# Patient Record
Sex: MALE | Race: WHITE | NOT HISPANIC OR LATINO | Employment: UNEMPLOYED | URBAN - METROPOLITAN AREA
[De-identification: names, ages, dates, MRNs, and addresses within clinical notes are randomized per-mention and may not be internally consistent; named-entity substitution may affect disease eponyms.]

---

## 2018-07-19 ENCOUNTER — HOSPITAL ENCOUNTER (EMERGENCY)
Facility: HOSPITAL | Age: 53
Discharge: HOME | End: 2018-07-20
Attending: EMERGENCY MEDICINE | Admitting: EMERGENCY MEDICINE

## 2018-07-19 ENCOUNTER — APPOINTMENT (EMERGENCY)
Dept: RADIOLOGY | Facility: HOSPITAL | Age: 53
End: 2018-07-19

## 2018-07-19 DIAGNOSIS — F10.929 ALCOHOL INTOXICATION (HCC): Primary | ICD-10-CM

## 2018-07-19 LAB
AMPHETAMINES SERPL QL SCN: NEGATIVE
BARBITURATES UR QL: NEGATIVE
BENZODIAZ UR QL: NEGATIVE
COCAINE UR QL: NEGATIVE
ETHANOL SERPL-MCNC: 281 MG/DL (ref 0–3)
METHADONE UR QL: NEGATIVE
OPIATES UR QL SCN: NEGATIVE
PCP UR QL: NEGATIVE
THC UR QL: NEGATIVE

## 2018-07-19 PROCEDURE — 80320 DRUG SCREEN QUANTALCOHOLS: CPT | Performed by: EMERGENCY MEDICINE

## 2018-07-19 PROCEDURE — 70450 CT HEAD/BRAIN W/O DYE: CPT

## 2018-07-19 PROCEDURE — 80307 DRUG TEST PRSMV CHEM ANLYZR: CPT | Performed by: EMERGENCY MEDICINE

## 2018-07-19 PROCEDURE — 82075 ASSAY OF BREATH ETHANOL: CPT | Performed by: EMERGENCY MEDICINE

## 2018-07-19 PROCEDURE — 36415 COLL VENOUS BLD VENIPUNCTURE: CPT | Performed by: EMERGENCY MEDICINE

## 2018-07-19 PROCEDURE — 72125 CT NECK SPINE W/O DYE: CPT

## 2018-07-19 PROCEDURE — 96372 THER/PROPH/DIAG INJ SC/IM: CPT

## 2018-07-19 RX ORDER — MIDAZOLAM HYDROCHLORIDE 1 MG/ML
1 INJECTION INTRAMUSCULAR; INTRAVENOUS ONCE
Status: COMPLETED | OUTPATIENT
Start: 2018-07-19 | End: 2018-07-19

## 2018-07-19 RX ORDER — HALOPERIDOL 5 MG/ML
5 INJECTION INTRAMUSCULAR ONCE
Status: COMPLETED | OUTPATIENT
Start: 2018-07-19 | End: 2018-07-19

## 2018-07-19 RX ORDER — NICOTINE 21 MG/24HR
21 PATCH, TRANSDERMAL 24 HOURS TRANSDERMAL ONCE
Status: DISCONTINUED | OUTPATIENT
Start: 2018-07-19 | End: 2018-07-20 | Stop reason: HOSPADM

## 2018-07-19 RX ADMIN — HALOPERIDOL LACTATE 5 MG: 5 INJECTION, SOLUTION INTRAMUSCULAR at 16:09

## 2018-07-19 RX ADMIN — NICOTINE 21 MG: 21 PATCH, EXTENDED RELEASE TRANSDERMAL at 15:57

## 2018-07-19 RX ADMIN — MIDAZOLAM 1 MG: 1 INJECTION INTRAMUSCULAR; INTRAVENOUS at 17:07

## 2018-07-19 RX ADMIN — MIDAZOLAM 1 MG: 1 INJECTION INTRAMUSCULAR; INTRAVENOUS at 16:09

## 2018-07-19 NOTE — ED NOTES
Patient's sister Jeferson Lam and brother in law Tanner Angel [phone] on the phone talking to me about their concern with the patient's drinking/addiction problems  Tanner Angel states that they are working with an  to obtain guardianship for the patient  Tanner Angel asking if there is anything our doctor can do to determine that the patient is not safe to care for himself  I explained the evaluation process to him and informed him that we would need the patient's permission to inform them if he is discharged-which is their request  I also informed him about our process to determine what kind of treatment might be most appropriate for the patient         Anh Nova RN  07/19/18 [address], RN  07/19/18 9937

## 2018-07-19 NOTE — ED NOTES
Dumped two mini bottles of peach whiskey down the drain   Pt has cigarettes and lighter      Otilio Leyva, BRYAN  07/19/18 2333

## 2018-07-19 NOTE — ED NOTES
Pt becoming increasingly agitated, Dhruv and Yessi present while pt screaming and becoming belligerent  Pt attempting to break out of restraints, or stated he will break his own arms or legs attempting  Brake released on bed  Pt given meds to help him relax and calm down        Luciana Salter  07/19/18 8329

## 2018-07-19 NOTE — ED NOTES
Pt awoke and was extremely agitated screaming and again stating that he will break his arms and legs trying to get out of restraints  Pt was about to start spitting when [address] MD Resident approached pt and redirected him        Joanne Salter  07/19/18 3796
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
## 2018-07-19 NOTE — ED NOTES
While RN Yessi applying Nicotine patch, pt stated "Even though I love you ladies, I will run you over if you get in my way"  Pt repeated this multiple times while RN  present       [facility]  07/19/18 7048
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
## 2018-07-20 ENCOUNTER — DOCUMENTATION (OUTPATIENT)
Dept: EMERGENCY DEPT | Facility: HOSPITAL | Age: 53
End: 2018-07-20

## 2018-07-20 VITALS
WEIGHT: 150 LBS | SYSTOLIC BLOOD PRESSURE: 121 MMHG | OXYGEN SATURATION: 96 % | RESPIRATION RATE: 18 BRPM | TEMPERATURE: 96.4 F | DIASTOLIC BLOOD PRESSURE: 68 MMHG | HEART RATE: 82 BPM

## 2018-07-20 LAB
ETHANOL EXG-MCNC: 0.1 MG/DL
ETHANOL EXG-MCNC: 0.13 MG/DL

## 2018-07-20 PROCEDURE — 82075 ASSAY OF BREATH ETHANOL: CPT | Performed by: EMERGENCY MEDICINE

## 2018-07-20 PROCEDURE — 99285 EMERGENCY DEPT VISIT HI MDM: CPT

## 2018-07-20 NOTE — ED NOTES
Patient's two lighters and cigarettes are on shelf C     Evon Aschoff, [address] [facility]  07/19/18 2110

## 2018-07-20 NOTE — DISCHARGE INSTRUCTIONS
Alcohol Intoxication   WHAT YOU NEED TO KNOW:   Alcohol intoxication is a harmful physical condition caused when you drink more alcohol than your body can handle  It is also called ethanol poisoning, or being drunk  DISCHARGE INSTRUCTIONS:   Medicine: You may be given medicine to manage the signs and symptoms of alcohol intoxication  Take your medicine as directed  Contact your healthcare provider if you think your medicine is not helping or if you have side effects  Tell him if you are allergic to any medicine  Keep a list of the medicines, vitamins, and herbs you take  Include the amounts, and when and why you take them  Bring the list or the pill bottles to follow-up visits  Keep the list with you in case of emergency  Follow up with your healthcare provider as directed:  Write down your questions so you remember to ask them during your visits  Limit or avoid alcohol:  Men should not have more than 2 drinks per day  Women should not have more than 1 drink per day  A drink is 12 ounces of beer, 5 ounces of wine, or 1½ ounces of liquor  Do not drive or operate machines when you drink alcohol:  Make sure you always have someone to drive you when you drink alcohol  For more information:   · Alcoholics Anonymous  Web Address: http://www horn [employer]/  Contact your healthcare provider if:   · You need help to stop drinking alcohol  · You have trouble with work or school because you drink too much alcohol  · You have physical or verbal fights because of alcohol  · You have questions or concerns about your condition or care  Return to the emergency department if:   · You have sudden trouble breathing or chest pain  · You have a seizure  · You feel sad enough to harm yourself or others  · You have hallucinations (you see or hear things that are not real)  · You cannot stop vomiting  · You were in an accident because of alcohol    © 2017 [address] [employer] Information is for End User's use only and may not be sold, redistributed or otherwise used for commercial purposes  All illustrations and images included in CareNotes® are the copyrighted property of A D A M , Inc  or [employer] Hui  The above information is an  only  It is not intended as medical advice for individual conditions or treatments  Talk to your doctor, nurse or pharmacist before following any medical regimen to see if it is safe and effective for you

## 2018-07-20 NOTE — ED CARE HANDOFF
The patient was re-evaluated at 1:30 a m  he has a breath alcohol level of 100   he is not clinically intoxicated   he denies wanting to hurt himself he denies wanting to hurt others   he is awake alert orient x3 he is requesting to be discharged  He is not hallucinating  He has no access to firearms  He is also requesting a cigarette  Patient was offered help with his alcohol problem   he declined         Mayda Plasencia MD  07/20/18 4750

## 2018-07-20 NOTE — ED NOTES
Restraints (right ankle, left wrist) d/c'd  Order for restraints placed for restraints that were in place at 2300 yesterday        Shauna Snow RN  07/20/18 7366
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
## 2018-12-17 ENCOUNTER — HOSPITAL ENCOUNTER (EMERGENCY)
Facility: HOSPITAL | Age: 53
Discharge: HOME | End: 2018-12-17
Attending: EMERGENCY MEDICINE
Payer: COMMERCIAL

## 2018-12-17 VITALS
OXYGEN SATURATION: 97 % | SYSTOLIC BLOOD PRESSURE: 147 MMHG | RESPIRATION RATE: 18 BRPM | WEIGHT: 180 LBS | HEART RATE: 78 BPM | DIASTOLIC BLOOD PRESSURE: 81 MMHG | TEMPERATURE: 97.9 F

## 2018-12-17 DIAGNOSIS — S61.412A LACERATION OF LEFT HAND WITHOUT FOREIGN BODY, INITIAL ENCOUNTER: Primary | ICD-10-CM

## 2018-12-17 PROCEDURE — 99282 EMERGENCY DEPT VISIT SF MDM: CPT

## 2018-12-17 RX ORDER — BACITRACIN, NEOMYCIN, POLYMYXIN B 400; 3.5; 5 [USP'U]/G; MG/G; [USP'U]/G
1 OINTMENT TOPICAL ONCE
Status: COMPLETED | OUTPATIENT
Start: 2018-12-17 | End: 2018-12-17

## 2018-12-17 RX ORDER — LIDOCAINE HYDROCHLORIDE 10 MG/ML
5 INJECTION, SOLUTION EPIDURAL; INFILTRATION; INTRACAUDAL; PERINEURAL ONCE
Status: COMPLETED | OUTPATIENT
Start: 2018-12-17 | End: 2018-12-17

## 2018-12-17 RX ADMIN — LIDOCAINE HYDROCHLORIDE 5 ML: 10 INJECTION, SOLUTION EPIDURAL; INFILTRATION; INTRACAUDAL; PERINEURAL at 11:58

## 2018-12-17 RX ADMIN — BACITRACIN, NEOMYCIN, POLYMYXIN B 1 SMALL APPLICATION: 400; 3.5; 5 OINTMENT TOPICAL at 11:58

## 2018-12-17 NOTE — DISCHARGE INSTRUCTIONS
Laceration   WHAT YOU NEED TO KNOW:   A laceration is an injury to the skin and the soft tissue underneath it  Lacerations happen when you are cut or hit by something  They can happen anywhere on the body  DISCHARGE INSTRUCTIONS:   Return to the emergency department if:   · You have heavy bleeding or bleeding that does not stop after 10 minutes of holding firm, direct pressure over the wound  · Your wound opens up  Contact your healthcare provider if:   · You have a fever or chills  · Your laceration is red, warm, or swollen  · You have red streaks on your skin coming from your wound  · You have white or yellow drainage from the wound that smells bad  · You have pain that gets worse, even after treatment  · You have questions or concerns about your condition or care  Medicines:   · Prescription pain medicine  may be given  Ask how to take this medicine safely  · Antibiotics  help treat or prevent a bacterial infection  · Take your medicine as directed  Contact your healthcare provider if you think your medicine is not helping or if you have side effects  Tell him or her if you are allergic to any medicine  Keep a list of the medicines, vitamins, and herbs you take  Include the amounts, and when and why you take them  Bring the list or the pill bottles to follow-up visits  Carry your medicine list with you in case of an emergency  Care for your wound as directed:   · Do not get your wound wet  until your healthcare provider says it is okay  Do not soak your wound in water  Do not go swimming until your healthcare provider says it is okay  Carefully wash the wound with soap and water  Gently pat the area dry or allow it to air dry  · Change your bandages  when they get wet, dirty, or after washing  Apply new, clean bandages as directed  Do not apply elastic bandages or tape too tight  Do not put powders or lotions over your incision       · Apply antibiotic ointment as directed  Your healthcare provider may give you antibiotic ointment to put over your wound if you have stitches  If you have strips of tape over your incision, let them dry up and fall off on their own  If they do not fall off within 14 days, gently remove them  If you have glue over your wound, do not remove or pick at it  If your glue comes off, do not replace it with glue that you have at home  · Check your wound every day for signs of infection such as swelling, redness, or pus  Self-care:   · Apply ice  on your wound for 15 to 20 minutes every hour or as directed  Use an ice pack, or put crushed ice in a plastic bag  Cover it with a towel  Ice helps prevent tissue damage and decreases swelling and pain  · Use a splint as directed  A splint will decrease movement and stress on your wound  It may help it heal faster  A splint may be used for lacerations over joints or areas of your body that bend  Ask your healthcare provider how to apply and remove a splint  · Decrease scarring of your wound  by applying ointments as directed  Do not apply ointments until your healthcare provider says it is okay  You may need to wait until your wound is healed  Ask which ointment to buy and how often to use it  After your wound is healed, use sunscreen over the area when you are out in the sun  You should do this for at least 6 months to 1 year after your injury  Follow up with your healthcare provider as directed: You may need to follow up in 24 to 48 hours to have your wound checked for infection  You will need to return in 3 to 14 days if you have stitches or staples so they can be removed  Care for your wound as directed to prevent infection and help it heal  Write down your questions so you remember to ask them during your visits  © 2017 [employer] Information is for End User's use only and may not be sold, redistributed or otherwise used for commercial purposes   All illustrations and images included in CareNotes® are the copyrighted property of A [employer]  or [employer]  The above information is an  only  It is not intended as medical advice for individual conditions or treatments  Talk to your doctor, nurse or pharmacist before following any medical regimen to see if it is safe and effective for you  Care For Your Stitches   WHAT YOU NEED TO KNOW:   Stitches, or sutures, are used to close cuts and wounds on the skin  Stitches need to be removed after your wound has healed  DISCHARGE INSTRUCTIONS:   Return to the emergency department if:   · Your stitches come apart  · Blood soaks through your bandages  · You suddenly cannot move your injured joint  · You have sudden numbness around your wound  · You see red streaks coming from your wound  Contact your healthcare provider if:   · You have a fever and chills  · Your wound is red, warm, swollen, or leaking pus  · There is a bad smell coming from your wound  · You have increased pain in the wound area  · You have questions or concerns about your condition or care  Care for your stitches:  Keep your stitches clean and dry  You may need to cover your stitches with a bandage for 24 to 48 hours, or as directed  Do not bump or hit the suture area, as this could open the wound  Do not trim or shorten the ends of your stitches  If they rub on your clothing, put a gauze bandage between the stitches and your clothes  Clean your wound:  Carefully wash your wound with soap and water  For mouth and lip wounds, rinse your mouth after meals and at bedtime  Ask your healthcare provider what to use to rinse your mouth  If you have a scalp wound, you may gently wash your hair every 2 days with mild shampoo  Do not use hair products, such as hair spray  Help your wound heal:   · Elevate  your wound above the level of your heart as often as you can  This will help decrease swelling and pain   Prop your wound on pillows or blankets to keep it elevated comfortably  · Limit activity  Do not stretch the skin around your wound  This will help prevent bleeding and swelling of the wound area  Follow up with your healthcare provider as directed: You may need to return to have your stitches removed  Write down your questions so you remember to ask them during your visits  © 2017 [employer]  Information is for End User's use only and may not be sold, redistributed or otherwise used for commercial purposes  All illustrations and images included in CareNotes® are the copyrighted property of [employer]  or [employer]  The above information is an  only  It is not intended as medical advice for individual conditions or treatments  Talk to your doctor, nurse or pharmacist before following any medical regimen to see if it is safe and effective for you

## 2018-12-17 NOTE — ED PROVIDER NOTES
History  Chief Complaint   Patient presents with    Hand Laceration     pt reports he cut his L hand on a razor at work  pt believes he is UTD on tetanus  pt has his hand dressed with ducktape        History provided by:  Patient  Laceration   Location:  Hand  Hand laceration location:  L hand  Length:  3  Depth:  Cutaneous  Quality: straight    Bleeding: controlled    Time since incident:  2 hours  Laceration mechanism:  Metal edge  Pain details:     Severity:  No pain  Relieved by:  Pressure  Worsened by:  Nothing  Ineffective treatments:  None tried  Tetanus status:  Up to date  Associated symptoms: no fever, no focal weakness, no numbness, no rash, no redness, no swelling and no streaking        None       History reviewed  No pertinent past medical history  History reviewed  No pertinent surgical history  History reviewed  No pertinent family history  I have reviewed and agree with the history as documented  Social History   Substance Use Topics    Smoking status: Current Every Day Smoker    Smokeless tobacco: Never Used    Alcohol use Yes      Comment: "recovery for 5 months"        Review of Systems   Constitutional: Negative for fever  Skin: Negative for rash  Neurological: Negative for focal weakness  Physical Exam  Physical Exam   Constitutional: He appears well-developed and well-nourished  No distress  HENT:   Head: Normocephalic and atraumatic  Eyes: Pupils are equal, round, and reactive to light  Conjunctivae and EOM are normal    Neck: Normal range of motion  Neck supple  Cardiovascular: Normal rate, regular rhythm and intact distal pulses  Musculoskeletal: Normal range of motion  Neurological: He is alert  Skin: Skin is warm  He is not diaphoretic  No erythema  No pallor  3cm straight laceration on dorsum of left hand, over dorsal aspect of 1st metacarpal  Bleeding controlled  Nursing note and vitals reviewed        Vital Signs  ED Triage Vitals [12/17/18 1100] Temperature Pulse Respirations Blood Pressure SpO2   97 9 °F (36 6 °C) 78 18 147/81 97 %      Temp Source Heart Rate Source Patient Position - Orthostatic VS BP Location FiO2 (%)   Oral Monitor -- -- --      Pain Score       --           Vitals:    12/17/18 1100   BP: 147/81   Pulse: 78       Visual Acuity      ED Medications  Medications   lidocaine (PF) (XYLOCAINE-MPF) 1 % injection 5 mL (5 mL Infiltration Given 12/17/18 1158)   neomycin-bacitracin-polymyxin b (NEOSPORIN) ointment 1 small application (1 small application Topical Given 12/17/18 1158)       Diagnostic Studies  Results Reviewed     None                 No orders to display              Procedures  Lac Repair  Date/Time: 12/17/2018 12:27 PM  Performed by: Shelby Martins by: Jose Montalvo   Consent: Verbal consent obtained  Risks and benefits: risks, benefits and alternatives were discussed  Consent given by: patient  Required items: required blood products, implants, devices, and special equipment available  Patient identity confirmed: provided demographic data  Time out: Immediately prior to procedure a "time out" was called to verify the correct patient, procedure, equipment, support staff and site/side marked as required  Body area: upper extremity  Location details: left hand  Laceration length: 3 cm  Tendon involvement: none  Nerve involvement: none  Vascular damage: no    Anesthesia:  Local Anesthetic: lidocaine 1% without epinephrine  Anesthetic total: 3 mL    Sedation:  Patient sedated: no    Wound Dehiscence:  Superficial Wound Dehiscence: simple closure      Procedure Details:  Preparation: Patient was prepped and draped in the usual sterile fashion    Irrigation solution: saline  Irrigation method: syringe  Debridement: none  Degree of undermining: none  Skin closure: 5-0 nylon  Number of sutures: 4  Technique: simple  Approximation: loose  Approximation difficulty: simple  Dressing: antibiotic ointment and 4x4 sterile gauze  Patient tolerance: Patient tolerated the procedure well with no immediate complications             Phone Contacts  ED Phone Contact    ED Course                               MDM  Number of Diagnoses or Management Options  Laceration of left hand without foreign body, initial encounter: new and requires workup  Diagnosis management comments: SUBJECTIVE:   46 y o  male sustained laceration of hand 2 hours ago  Nature of injury: razor wire fell against dorsal aspect of hand  Tetanus vaccination status reviewed: last tetanus booster within 10 years, tetanus re-vaccination not indicated  OBJECTIVE:   Patient appears well, vitals are normal  Laceration 3 cm noted  Description: clean wound edges, no foreign bodies  Neurovascular and tendon structures are intact  ASSESSMENT:   Laceration as described  PLAN:   Anesthesia with 1% Lidocaine without Epinephrine  Wound cleansed, debrided of visible foreign material and necrotic tissue, and sutured  Antibiotic ointment and dressing applied  Wound care instructions provided  Observe for any signs of infection or other problems  Return for suture removal in 10 days  Risk of Complications, Morbidity, and/or Mortality  Presenting problems: low  Diagnostic procedures: low  Management options: low    Patient Progress  Patient progress: improved    CritCare Time    Disposition  Final diagnoses:   Laceration of left hand without foreign body, initial encounter     Time reflects when diagnosis was documented in both MDM as applicable and the Disposition within this note     Time User Action Codes Description Comment    12/17/2018 12:28 PM Walter Wylie Add [[id number] [id number]] Laceration of left hand without foreign body, initial encounter       ED Disposition     ED Disposition Condition Comment    Discharge  Katty Winter discharge to home/self care      Condition at discharge: Stable        Follow-up Information     Follow up With Specialties Details Why Contact Info Additional [address] Emergency Department Emergency Medicine  For suture removal, If symptoms worsen [address]  [phone]  ED, [address] [place] [address], [facility]          There are no discharge medications for this patient  No discharge procedures on file      ED Provider  Electronically Signed by           Edvin Macdonald PA-C  12/17/18 2651

## 2019-01-24 ENCOUNTER — HOSPITAL ENCOUNTER (EMERGENCY)
Facility: HOSPITAL | Age: 54
Discharge: LEFT BEFORE DISCHARGE | End: 2019-01-25
Attending: EMERGENCY MEDICINE | Admitting: INTERNAL MEDICINE
Payer: COMMERCIAL

## 2019-01-24 ENCOUNTER — APPOINTMENT (EMERGENCY)
Dept: RADIOLOGY | Facility: HOSPITAL | Age: 54
End: 2019-01-24
Payer: COMMERCIAL

## 2019-01-24 VITALS
HEART RATE: 110 BPM | RESPIRATION RATE: 22 BRPM | WEIGHT: 175 LBS | OXYGEN SATURATION: 94 % | DIASTOLIC BLOOD PRESSURE: 75 MMHG | SYSTOLIC BLOOD PRESSURE: 134 MMHG | TEMPERATURE: 98.8 F

## 2019-01-24 DIAGNOSIS — J40 BRONCHITIS: Primary | ICD-10-CM

## 2019-01-24 DIAGNOSIS — J44.9 COPD (CHRONIC OBSTRUCTIVE PULMONARY DISEASE) (HCC): ICD-10-CM

## 2019-01-24 PROBLEM — J96.00 ACUTE RESPIRATORY FAILURE (HCC): Status: ACTIVE | Noted: 2019-01-24

## 2019-01-24 PROBLEM — J96.01 ACUTE RESPIRATORY FAILURE WITH HYPOXIA (HCC): Status: ACTIVE | Noted: 2019-01-24

## 2019-01-24 PROBLEM — F10.21 ALCOHOL DEPENDENCE IN REMISSION (HCC): Chronic | Status: ACTIVE | Noted: 2019-01-24

## 2019-01-24 PROBLEM — J44.1 CHRONIC OBSTRUCTIVE PULMONARY DISEASE WITH ACUTE EXACERBATION (HCC): Status: ACTIVE | Noted: 2019-01-24

## 2019-01-24 PROBLEM — F17.200 NICOTINE DEPENDENCE: Chronic | Status: ACTIVE | Noted: 2019-01-24

## 2019-01-24 LAB
ALBUMIN SERPL BCP-MCNC: 4 G/DL (ref 3.5–5)
ALP SERPL-CCNC: 83 U/L (ref 46–116)
ALT SERPL W P-5'-P-CCNC: 23 U/L (ref 12–78)
ANION GAP SERPL CALCULATED.3IONS-SCNC: 5 MMOL/L (ref 4–13)
AST SERPL W P-5'-P-CCNC: 36 U/L (ref 5–45)
BASOPHILS # BLD AUTO: 0.03 THOUSANDS/ΜL (ref 0–0.1)
BASOPHILS NFR BLD AUTO: 1 % (ref 0–1)
BILIRUB SERPL-MCNC: 0.38 MG/DL (ref 0.2–1)
BUN SERPL-MCNC: 12 MG/DL (ref 5–25)
CALCIUM SERPL-MCNC: 8.7 MG/DL (ref 8.3–10.1)
CHLORIDE SERPL-SCNC: 107 MMOL/L (ref 100–108)
CO2 SERPL-SCNC: 26 MMOL/L (ref 21–32)
CREAT SERPL-MCNC: 0.96 MG/DL (ref 0.6–1.3)
EOSINOPHIL # BLD AUTO: 0.01 THOUSAND/ΜL (ref 0–0.61)
EOSINOPHIL NFR BLD AUTO: 0 % (ref 0–6)
ERYTHROCYTE [DISTWIDTH] IN BLOOD BY AUTOMATED COUNT: 12.4 % (ref 11.6–15.1)
GFR SERPL CREATININE-BSD FRML MDRD: 89 ML/MIN/1.73SQ M
GLUCOSE SERPL-MCNC: 101 MG/DL (ref 65–140)
HCT VFR BLD AUTO: 43.3 % (ref 36.5–49.3)
HGB BLD-MCNC: 14.5 G/DL (ref 12–17)
IMM GRANULOCYTES # BLD AUTO: 0.03 THOUSAND/UL (ref 0–0.2)
IMM GRANULOCYTES NFR BLD AUTO: 1 % (ref 0–2)
LYMPHOCYTES # BLD AUTO: 0.42 THOUSANDS/ΜL (ref 0.6–4.47)
LYMPHOCYTES NFR BLD AUTO: 6 % (ref 14–44)
MCH RBC QN AUTO: 32.2 PG (ref 26.8–34.3)
MCHC RBC AUTO-ENTMCNC: 33.5 G/DL (ref 31.4–37.4)
MCV RBC AUTO: 96 FL (ref 82–98)
MONOCYTES # BLD AUTO: 0.56 THOUSAND/ΜL (ref 0.17–1.22)
MONOCYTES NFR BLD AUTO: 9 % (ref 4–12)
NEUTROPHILS # BLD AUTO: 5.52 THOUSANDS/ΜL (ref 1.85–7.62)
NEUTS SEG NFR BLD AUTO: 83 % (ref 43–75)
NRBC BLD AUTO-RTO: 0 /100 WBCS
PLATELET # BLD AUTO: 244 THOUSANDS/UL (ref 149–390)
PMV BLD AUTO: 10 FL (ref 8.9–12.7)
POTASSIUM SERPL-SCNC: 3.8 MMOL/L (ref 3.5–5.3)
PROT SERPL-MCNC: 7.5 G/DL (ref 6.4–8.2)
RBC # BLD AUTO: 4.51 MILLION/UL (ref 3.88–5.62)
SODIUM SERPL-SCNC: 138 MMOL/L (ref 136–145)
TROPONIN I SERPL-MCNC: <0.02 NG/ML
WBC # BLD AUTO: 6.57 THOUSAND/UL (ref 4.31–10.16)

## 2019-01-24 PROCEDURE — 93005 ELECTROCARDIOGRAM TRACING: CPT

## 2019-01-24 PROCEDURE — 94644 CONT INHLJ TX 1ST HOUR: CPT

## 2019-01-24 PROCEDURE — 94760 N-INVAS EAR/PLS OXIMETRY 1: CPT

## 2019-01-24 PROCEDURE — 94640 AIRWAY INHALATION TREATMENT: CPT

## 2019-01-24 PROCEDURE — 80053 COMPREHEN METABOLIC PANEL: CPT | Performed by: EMERGENCY MEDICINE

## 2019-01-24 PROCEDURE — 96368 THER/DIAG CONCURRENT INF: CPT

## 2019-01-24 PROCEDURE — 36415 COLL VENOUS BLD VENIPUNCTURE: CPT | Performed by: EMERGENCY MEDICINE

## 2019-01-24 PROCEDURE — 96365 THER/PROPH/DIAG IV INF INIT: CPT

## 2019-01-24 PROCEDURE — 96375 TX/PRO/DX INJ NEW DRUG ADDON: CPT

## 2019-01-24 PROCEDURE — 99284 EMERGENCY DEPT VISIT MOD MDM: CPT

## 2019-01-24 PROCEDURE — 85025 COMPLETE CBC W/AUTO DIFF WBC: CPT | Performed by: EMERGENCY MEDICINE

## 2019-01-24 PROCEDURE — 84484 ASSAY OF TROPONIN QUANT: CPT | Performed by: EMERGENCY MEDICINE

## 2019-01-24 PROCEDURE — 71046 X-RAY EXAM CHEST 2 VIEWS: CPT

## 2019-01-24 RX ORDER — SODIUM CHLORIDE FOR INHALATION 0.9 %
3 VIAL, NEBULIZER (ML) INHALATION ONCE
Status: COMPLETED | OUTPATIENT
Start: 2019-01-24 | End: 2019-01-24

## 2019-01-24 RX ORDER — METHYLPREDNISOLONE SODIUM SUCCINATE 125 MG/2ML
125 INJECTION, POWDER, LYOPHILIZED, FOR SOLUTION INTRAMUSCULAR; INTRAVENOUS ONCE
Status: COMPLETED | OUTPATIENT
Start: 2019-01-24 | End: 2019-01-24

## 2019-01-24 RX ORDER — ALBUTEROL SULFATE 2.5 MG/3ML
5 SOLUTION RESPIRATORY (INHALATION) ONCE
Status: COMPLETED | OUTPATIENT
Start: 2019-01-24 | End: 2019-01-24

## 2019-01-24 RX ORDER — 0.9 % SODIUM CHLORIDE 0.9 %
3 VIAL (ML) INJECTION AS NEEDED
Status: DISCONTINUED | OUTPATIENT
Start: 2019-01-24 | End: 2019-01-25 | Stop reason: HOSPADM

## 2019-01-24 RX ADMIN — ISODIUM CHLORIDE 3 ML: 0.03 SOLUTION RESPIRATORY (INHALATION) at 21:57

## 2019-01-24 RX ADMIN — AZITHROMYCIN MONOHYDRATE 500 MG: 500 INJECTION, POWDER, LYOPHILIZED, FOR SOLUTION INTRAVENOUS at 22:45

## 2019-01-24 RX ADMIN — METHYLPREDNISOLONE SODIUM SUCCINATE 125 MG: 125 INJECTION, POWDER, FOR SOLUTION INTRAMUSCULAR; INTRAVENOUS at 22:15

## 2019-01-24 RX ADMIN — IPRATROPIUM BROMIDE 1 MG: 0.5 SOLUTION RESPIRATORY (INHALATION) at 21:57

## 2019-01-24 RX ADMIN — ALBUTEROL SULFATE 5 MG: 2.5 SOLUTION RESPIRATORY (INHALATION) at 19:32

## 2019-01-24 RX ADMIN — CEFTRIAXONE SODIUM 1000 MG: 10 INJECTION, POWDER, FOR SOLUTION INTRAVENOUS at 22:20

## 2019-01-24 RX ADMIN — IPRATROPIUM BROMIDE 0.5 MG: 0.5 SOLUTION RESPIRATORY (INHALATION) at 19:32

## 2019-01-24 RX ADMIN — ALBUTEROL SULFATE 10 MG: 2.5 SOLUTION RESPIRATORY (INHALATION) at 21:57

## 2019-01-24 NOTE — Clinical Note
Case was discussed with SOD and the patient's admission status was agreed to be Admission Status: inpatient status to the service of Dr Joan Osei

## 2019-01-25 LAB
ATRIAL RATE: 104 BPM
P AXIS: 72 DEGREES
PR INTERVAL: 148 MS
QRS AXIS: 82 DEGREES
QRSD INTERVAL: 100 MS
QT INTERVAL: 320 MS
QTC INTERVAL: 420 MS
T WAVE AXIS: 74 DEGREES
VENTRICULAR RATE: 104 BPM

## 2019-01-25 PROCEDURE — 93010 ELECTROCARDIOGRAM REPORT: CPT | Performed by: INTERNAL MEDICINE

## 2019-01-25 NOTE — ED ATTENDING ATTESTATION
Feliberto Wilson MD, saw and evaluated the patient  All available labs and X-rays were ordered by me or the resident and have been reviewed by myself  I discussed the patient with the resident / non-physician and agree with the resident's / non-physician practitioner's findings and plan as documented in the resident's / non-physician practicitioner's note, except where noted  At this point, I agree with the current assessment done in the ED  Chief Complaint   Patient presents with    Cough     "i've been sick for 2 days, my lungs are so congested i can hardly breath", unknown fever, nasal congestion       This is a 48-year-old male presenting for URI x2 days  The patient states that multiple people at home are sick with URI symptoms  For 2 days though he has been having symptoms feeling he can't get enough air, feeling very congested and hard to breathe  Denies any fevers night sweats chills  Denies chest pain  Denies arm pain jaw pain back pain diaphoresis  His cough is productive greenish mucus at times  His main concern is also nasal congestion that is there, denies sore throat  No history of similar  Does not use any breathing treatments  He is currently in rehab for alcohol abuse, clean for 6 months  PMH:  - None  PSH:  - None  1ppd x40 years  +alcohol in past, no alcohol x6 months  No drug use ever  PE:  Vitals:    01/24/19 1927 01/24/19 2157 01/24/19 2220   BP: 142/81  134/75   BP Location: Left arm  Right arm   Pulse: (!) 106  (!) 110   Resp: (!) 24  22   Temp: 98 8 °F (37 1 °C)     TempSrc: Oral     SpO2: 96% 94% 94%   Weight: 79 4 kg (175 lb)     General: VSS, NAD, awake, alert  Well-nourished, well-developed  Appears stated age  Speaking normally in full sentences  Head: Normocephalic, atraumatic, nontender  Eyes: PERRL, EOM-I  No diplopia  No hyphema  No subconjunctival hemorrhages  Symmetrical lids  ENT: Atraumatic external nose and ears  MMM  No malocclusion   No stridor  Normal phonation  No drooling  Normal swallowing  Neck: Symmetric, trachea midline  No JVD  CV: tchycardia ()   +S1/S2  No murmurs or gallops  Peripheral pulses +2 throughout  No chest wall tenderness  Lungs:    mildly labored, wheezing present, barrel chest, no cyanosis, positive hypoxia with an O2 value of 88% on room air, not normally on oxygen supplementation  Abd: +BS, soft, NT/ND    MSK:   FROM   Back:   No rashes  Skin: Dry, intact  Neuro: AAOx3, GCS 15, CN II-XII grossly intact  Motor grossly intact  Psychiatric/Behavioral: Appropriate mood and affect   Exam: deferred  A:  - URI  P:  - likely URI but given severity of work of breathing, +hypoxia, will do aggressive treatment  - We will do IVF for insensible losses  - Will do IV steroids if EMS has not provided it; solumedrol 125mg  - We will do aggressive beta-agonist therapy, specifically 1 hour LUNA neb   - CXR for PNA  - Will continue to monitor patient  - Re-assess --> disposition based on improvement  If still wheezing, tachypneic, or tachycardic, or appears ill and unable to do baseline activities, will admit  - 13 point ROS was performed and all are normal unless stated in the history above  - Nursing note reviewed  Vitals reviewed  - Orders placed by myself and/or advanced practitioner / resident     - Previous chart was reviewed  - No language barrier    - History obtained from patient  - There are no limitations to the history obtained  - Critical care time: 33 minutes  - Critical care time was exclusive of seperately bilable procedures and treating other patients as well as teaching time     - Critical care was necessary to treat or prevent imminent or life-threatening deterioration of the following condition: Dyspnea, COPD exacerbation necessitating 1 hour neb treatment  - Critical care time was spent personally by me on the following activities as well as the above as per the ED course and rest of chart: blood draw for specimens, obtaining history from patient / surrogate, developement of a treatment plan, discussions with consultants, evaluation of patient's response to the treatment, examination of the patient, ordering/performing treatements and interventions, re-evaluation of the patient's condition, review of old charts, ordering/reviewing laboratory studies, ordering/reviewing of radiographic studies    Final Diagnosis:  1  Bronchitis    2  COPD (chronic obstructive pulmonary disease) ([facility])         Medications   albuterol inhalation solution 5 mg (5 mg Nebulization Given 1/24/19 1932)   ipratropium (ATROVENT) 0 02 % inhalation solution 0 5 mg (0 5 mg Nebulization Given 1/24/19 1932)   methylPREDNISolone sodium succinate (Solu-MEDROL) injection 125 mg (125 mg Intravenous Given 1/24/19 2215)   albuterol inhalation solution 10 mg (10 mg Nebulization Given 1/24/19 2157)     And   ipratropium (ATROVENT) 0 02 % inhalation solution 1 mg (1 mg Nebulization Given 1/24/19 2157)     And   sodium chloride 0 9 % inhalation solution 3 mL (3 mL Nebulization Given 1/24/19 2157)   ceftriaxone (ROCEPHIN) 1 g/50 mL in dextrose IVPB (0 mg Intravenous Stopped 1/24/19 2352)   azithromycin (ZITHROMAX) 500 mg in sodium chloride 0 9% 250mL IVPB 500 mg (0 mg Intravenous Stopped 1/24/19 2352)     X-ray chest 2 views   Final Result      No acute cardiopulmonary disease              Workstation performed: [id number]           Orders Placed This Encounter   Procedures    X-ray chest 2 views    CBC and differential    Troponin I    Comprehensive metabolic panel    Continuous cardiac monitoring    Continuous pulse oximetry    EKG RESULTS    ECG 12 lead    ECG 12 lead    Inpatient Admission    Update to Emergency Patient Class     Labs Reviewed   CBC AND DIFFERENTIAL - Abnormal        Result Value Ref Range Status    WBC 6 57  4 31 - 10 16 Thousand/uL Final    RBC 4 51  3 88 - 5 62 Million/uL Final    Hemoglobin 14 5  12 0 - 17 0 g/dL Final    Hematocrit 43 3  36 5 - 49 3 % Final    MCV 96  82 - 98 fL Final    MCH 32 2  26 8 - 34 3 pg Final    MCHC 33 5  31 4 - 37 4 g/dL Final    RDW 12 4  11 6 - 15 1 % Final    MPV 10 0  8 9 - 12 7 fL Final    Platelets 917  633 - 390 Thousands/uL Final    nRBC 0  /100 WBCs Final    Neutrophils Relative 83 (*) 43 - 75 % Final    Immat GRANS % 1  0 - 2 % Final    Lymphocytes Relative 6 (*) 14 - 44 % Final    Monocytes Relative 9  4 - 12 % Final    Eosinophils Relative 0  0 - 6 % Final    Basophils Relative 1  0 - 1 % Final    Neutrophils Absolute 5 52  1 85 - 7 62 Thousands/µL Final    Immature Grans Absolute 0 03  0 00 - 0 20 Thousand/uL Final    Lymphocytes Absolute 0 42 (*) 0 60 - 4 47 Thousands/µL Final    Monocytes Absolute 0 56  0 17 - 1 22 Thousand/µL Final    Eosinophils Absolute 0 01  0 00 - 0 61 Thousand/µL Final    Basophils Absolute 0 03  0 00 - 0 10 Thousands/µL Final   TROPONIN I - Normal    Troponin I <0 02  <=0 04 ng/mL Final    Comment:   [employer] Chemistry analyzer 99% cutoff is > 0 04 ng/mL in network labs     o cTnI 99% cutoff is useful only when applied to patients in the clinical setting of myocardial ischemia   o cTnI 99% cutoff should be interpreted in the context of clinical history, ECG findings and possibly cardiac imaging to establish correct diagnosis  o cTnI 99% cutoff may be suggestive but clearly not indicative of a coronary event without the clinical setting of myocardial ischemia  COMPREHENSIVE METABOLIC PANEL    Sodium 685  136 - 145 mmol/L Final    Potassium 3 8  3 5 - 5 3 mmol/L Final    Comment: Slightly Hemolyzed;  Results May be Affected    Chloride 107  100 - 108 mmol/L Final    CO2 26  21 - 32 mmol/L Final    ANION GAP 5  4 - 13 mmol/L Final    BUN 12  5 - 25 mg/dL Final    Creatinine 0 96  0 60 - 1 30 mg/dL Final    Comment: Standardized to IDMS reference method    Glucose 101  65 - 140 mg/dL Final    Comment:   If the patient is fasting, the ADA then defines impaired fasting glucose as > 100 mg/dL and diabetes as > or equal to 123 mg/dL  Specimen collection should occur prior to Sulfasalazine administration due to the potential for falsely depressed results  Specimen collection should occur prior to Sulfapyridine administration due to the potential for falsely elevated results  Calcium 8 7  8 3 - 10 1 mg/dL Final    AST 36  5 - 45 U/L Final    Comment: Slightly Hemolyzed; Results May be Affected  Specimen collection should occur prior to Sulfasalazine administration due to the potential for falsely depressed results  ALT 23  12 - 78 U/L Final    Comment:   Specimen collection should occur prior to Sulfasalazine and/or Sulfapyridine administration due to the potential for falsely depressed results  Alkaline Phosphatase 83  46 - 116 U/L Final    Total Protein 7 5  6 4 - 8 2 g/dL Final    Albumin 4 0  3 5 - 5 0 g/dL Final    Total Bilirubin 0 38  0 20 - 1 00 mg/dL Final    eGFR 89  ml/min/1 73sq m Final    Narrative:     National Kidney Disease Education Program recommendations are as follows:  GFR calculation is accurate only with a steady state creatinine  Chronic Kidney disease less than 60 ml/min/1 73 sq  meters  Kidney failure less than 15 ml/min/1 73 sq  meters  Time reflects when diagnosis was documented in both MDM as applicable and the Disposition within this note     Time User Action Codes Description Comment    1/24/2019 11:24 PM Elizabeth Elias Add [J40] Bronchitis     1/25/2019 12:10 AM Elizabeth Block Add [J44 9] COPD (chronic obstructive pulmonary disease) [facility])       ED Disposition     ED Disposition Condition Comment    AMA  Case was discussed with SOD and the patient's admission status was agreed to be Admission Status: inpatient status to the service of Dr Tj Rosa   Follow-up Information    None       There are no discharge medications for this patient  No discharge procedures on file    None       Portions of the record may have been created with voice recognition software  Occasional wrong word or "sound a like" substitutions may have occurred due to the inherent limitations of voice recognition software  Read the chart carefully and recognize, using context, where substitutions have occurred      Electronically signed by:  Eric Menezes

## 2019-01-25 NOTE — ED PROVIDER NOTES
History  Chief Complaint   Patient presents with    Cough     "i've been sick for 2 days, my lungs are so congested i can hardly breath", unknown fever, nasal congestion     45 y/o male presents to the ED for cough, congestion, and sob since yesterday  Patient states that there are multiple sick contacts at home  Patient states that he is a smoker  States that for the last 2 days he has had cough of yellow/ clear sputum and feels like he cannot get enough air because he feels very congested  Denies any fevers, chills, cp, n/v, abd pain, or urinary symptoms  No other complaints  He states that he does not have a pcp- not on any breathing treatments at home  He is currently in rehab for alcohol abuse and states that he has been clean for 6 months  History provided by:  Patient  Cough   Cough characteristics:  Productive  Sputum characteristics:  Yellow and clear  Severity:  Moderate  Onset quality:  Sudden  Duration:  2 days  Timing:  Constant  Progression:  Worsening  Chronicity:  New  Context: sick contacts    Relieved by:  None tried  Worsened by:  Nothing  Ineffective treatments:  None tried  Associated symptoms: shortness of breath    Associated symptoms: no chest pain, no chills, no ear pain, no fever, no headaches, no rash, no rhinorrhea, no sore throat and no wheezing             None       History reviewed  No pertinent past medical history  History reviewed  No pertinent surgical history  History reviewed  No pertinent family history  I have reviewed and agree with the history as documented  Social History   Substance Use Topics    Smoking status: Current Every Day Smoker     Packs/day: 1 00     Years: 40 00     Types: Cigarettes    Smokeless tobacco: Never Used    Alcohol use Yes      Comment: "recovery for 5 months"        Review of Systems   Constitutional: Negative for chills and fever  HENT: Negative for congestion, ear pain, rhinorrhea and sore throat      Eyes: Negative for pain and visual disturbance  Respiratory: Positive for cough and shortness of breath  Negative for wheezing  Cardiovascular: Negative for chest pain and leg swelling  Gastrointestinal: Negative for abdominal pain, diarrhea, nausea and vomiting  Genitourinary: Negative for dysuria, frequency, hematuria and urgency  Musculoskeletal: Negative for neck pain and neck stiffness  Skin: Negative for rash and wound  Neurological: Negative for weakness, numbness and headaches  Psychiatric/Behavioral: Negative for agitation and confusion  All other systems reviewed and are negative  Physical Exam  ED Triage Vitals [01/24/19 1927]   Temperature Pulse Respirations Blood Pressure SpO2   98 8 °F (37 1 °C) (!) 106 (!) 24 142/81 96 %      Temp Source Heart Rate Source Patient Position - Orthostatic VS BP Location FiO2 (%)   Oral Monitor Sitting Left arm --      Pain Score       2           Orthostatic Vital Signs  Vitals:    01/24/19 1927 01/24/19 2220   BP: 142/81 134/75   Pulse: (!) 106 (!) 110   Patient Position - Orthostatic VS: Sitting        Physical Exam   Constitutional: He is oriented to person, place, and time  He appears well-developed and well-nourished  HENT:   Head: Normocephalic and atraumatic  Mouth/Throat: Oropharynx is clear and moist    Eyes: Pupils are equal, round, and reactive to light  EOM are normal    Neck: Normal range of motion  Neck supple  Cardiovascular: Regular rhythm  Tachycardia present  Pulmonary/Chest: Effort normal  He has decreased breath sounds in the right upper field, the right lower field, the left upper field and the left lower field  Abdominal: Soft  Bowel sounds are normal  He exhibits no distension  There is no tenderness  Musculoskeletal: Normal range of motion  He exhibits no edema  Neurological: He is alert and oriented to person, place, and time  No focal deficits   Skin: Skin is warm and dry  Nursing note and vitals reviewed        ED Medications  Medications   sodium chloride (PF) 0 9 % injection 3 mL (not administered)   albuterol inhalation solution 5 mg (5 mg Nebulization Given 1/24/19 1932)   ipratropium (ATROVENT) 0 02 % inhalation solution 0 5 mg (0 5 mg Nebulization Given 1/24/19 1932)   methylPREDNISolone sodium succinate (Solu-MEDROL) injection 125 mg (125 mg Intravenous Given 1/24/19 2215)   albuterol inhalation solution 10 mg (10 mg Nebulization Given 1/24/19 2157)     And   ipratropium (ATROVENT) 0 02 % inhalation solution 1 mg (1 mg Nebulization Given 1/24/19 2157)     And   sodium chloride 0 9 % inhalation solution 3 mL (3 mL Nebulization Given 1/24/19 2157)   ceftriaxone (ROCEPHIN) 1 g/50 mL in dextrose IVPB (0 mg Intravenous Stopped 1/24/19 2352)   azithromycin (ZITHROMAX) 500 mg in sodium chloride 0 9% 250mL IVPB 500 mg (0 mg Intravenous Stopped 1/24/19 2352)       Diagnostic Studies  Results Reviewed     Procedure Component Value Units Date/Time    Comprehensive metabolic panel [[id number]] Collected:  01/24/19 2215    Lab Status:  Final result Specimen:  Blood from Arm, Left Updated:  01/24/19 2243     Sodium 138 mmol/L      Potassium 3 8 mmol/L      Chloride 107 mmol/L      CO2 26 mmol/L      ANION GAP 5 mmol/L      BUN 12 mg/dL      Creatinine 0 96 mg/dL      Glucose 101 mg/dL      Calcium 8 7 mg/dL      AST 36 U/L      ALT 23 U/L      Alkaline Phosphatase 83 U/L      Total Protein 7 5 g/dL      Albumin 4 0 g/dL      Total Bilirubin 0 38 mg/dL      eGFR 89 ml/min/1 73sq m     Narrative:         National Kidney Disease Education Program recommendations are as follows:  GFR calculation is accurate only with a steady state creatinine  Chronic Kidney disease less than 60 ml/min/1 73 sq  meters  Kidney failure less than 15 ml/min/1 73 sq  meters      Troponin I [[id number]]  (Normal) Collected:  01/24/19 2215    Lab Status:  Final result Specimen:  Blood from Arm, Left Updated:  01/24/19 2241     Troponin I <0 02 ng/mL     CBC and differential [[id number]]  (Abnormal) Collected:  01/24/19 2215    Lab Status:  Final result Specimen:  Blood from Arm, Left Updated:  01/24/19 2241     WBC 6 57 Thousand/uL      RBC 4 51 Million/uL      Hemoglobin 14 5 g/dL      Hematocrit 43 3 %      MCV 96 fL      MCH 32 2 pg      MCHC 33 5 g/dL      RDW 12 4 %      MPV 10 0 fL      Platelets 577 Thousands/uL      nRBC 0 /100 WBCs      Neutrophils Relative 83 (H) %      Immat GRANS % 1 %      Lymphocytes Relative 6 (L) %      Monocytes Relative 9 %      Eosinophils Relative 0 %      Basophils Relative 1 %      Neutrophils Absolute 5 52 Thousands/µL      Immature Grans Absolute 0 03 Thousand/uL      Lymphocytes Absolute 0 42 (L) Thousands/µL      Monocytes Absolute 0 56 Thousand/µL      Eosinophils Absolute 0 01 Thousand/µL      Basophils Absolute 0 03 Thousands/µL                  X-ray chest 2 views    (Results Pending)         Procedures  Procedures      Phone Consults  ED Phone Contact    ED Course                               MDM  Number of Diagnoses or Management Options  Bronchitis: new and requires workup  COPD (chronic obstructive pulmonary disease) ([facility] [address] ): new and requires workup  Diagnosis management comments: Patient with cough, congestion, and sob- will get cardiac workup and cxr- will give heart neb, steroids, and antibiotics  Will admit  Patient reevaluated and feels improved  Patient updated on results of tests and plan of care including admission to hospital for further evaluation of presenting complaint  Patient demonstrates verbal understanding and agrees with plan  Report to SOD  with admission under Dr Jacqueline Velez for continuation of patient care          Amount and/or Complexity of Data Reviewed  Clinical lab tests: ordered and reviewed  Tests in the radiology section of CPT®: ordered and reviewed  Tests in the medicine section of CPT®: ordered and reviewed  Discussion of test results with the performing providers: yes  Decide to obtain previous medical records or to obtain history from someone other than the patient: yes  Obtain history from someone other than the patient: yes  Review and summarize past medical records: yes  Discuss the patient with other providers: yes  Independent visualization of images, tracings, or specimens: yes    Patient Progress  Patient progress: improved    CritCare Time    Disposition  Final diagnoses:   Bronchitis   COPD (chronic obstructive pulmonary disease) ([facility] )     Time reflects when diagnosis was documented in both MDM as applicable and the Disposition within this note     Time User Action Codes Description Comment    1/24/2019 11:24 PM Judith Rawls Add [J40] Bronchitis     1/25/2019 12:10 AM Judith Block Add [J44 9] COPD (chronic obstructive pulmonary disease) ([place] [id number] )       ED Disposition     ED Disposition Condition Comment    AMA  Case was discussed with SOD and the patient's admission status was agreed to be Admission Status: inpatient status to the service of Dr Cortney Machuca   Follow-up Information    None         There are no discharge medications for this patient  No discharge procedures on file  ED Provider  Attending physically available and evaluated Jenna Mcdaniel I managed the patient along with the ED Attending      Electronically Signed by         Leia Whittington DO  01/25/19 0023

## 2019-01-26 ENCOUNTER — HOSPITAL ENCOUNTER (OUTPATIENT)
Facility: HOSPITAL | Age: 54
Setting detail: OBSERVATION
Discharge: HOME | End: 2019-01-27
Attending: EMERGENCY MEDICINE | Admitting: INTERNAL MEDICINE
Payer: COMMERCIAL

## 2019-01-26 ENCOUNTER — APPOINTMENT (EMERGENCY)
Dept: RADIOLOGY | Facility: HOSPITAL | Age: 54
End: 2019-01-26
Payer: COMMERCIAL

## 2019-01-26 DIAGNOSIS — J40 BRONCHITIS: ICD-10-CM

## 2019-01-26 DIAGNOSIS — J44.1 COPD WITH ACUTE EXACERBATION (HCC): Primary | ICD-10-CM

## 2019-01-26 LAB
ALBUMIN SERPL BCP-MCNC: 3.9 G/DL (ref 3.5–5)
ALP SERPL-CCNC: 75 U/L (ref 46–116)
ALT SERPL W P-5'-P-CCNC: 24 U/L (ref 12–78)
ANION GAP SERPL CALCULATED.3IONS-SCNC: 4 MMOL/L (ref 4–13)
AST SERPL W P-5'-P-CCNC: 26 U/L (ref 5–45)
ATRIAL RATE: 78 BPM
BASOPHILS # BLD AUTO: 0.04 THOUSANDS/ΜL (ref 0–0.1)
BASOPHILS NFR BLD AUTO: 1 % (ref 0–1)
BILIRUB SERPL-MCNC: 0.24 MG/DL (ref 0.2–1)
BUN SERPL-MCNC: 14 MG/DL (ref 5–25)
CALCIUM SERPL-MCNC: 8.4 MG/DL (ref 8.3–10.1)
CHLORIDE SERPL-SCNC: 108 MMOL/L (ref 100–108)
CO2 SERPL-SCNC: 27 MMOL/L (ref 21–32)
CREAT SERPL-MCNC: 0.87 MG/DL (ref 0.6–1.3)
EOSINOPHIL # BLD AUTO: 0.03 THOUSAND/ΜL (ref 0–0.61)
EOSINOPHIL NFR BLD AUTO: 1 % (ref 0–6)
ERYTHROCYTE [DISTWIDTH] IN BLOOD BY AUTOMATED COUNT: 12.5 % (ref 11.6–15.1)
GFR SERPL CREATININE-BSD FRML MDRD: 98 ML/MIN/1.73SQ M
GLUCOSE SERPL-MCNC: 90 MG/DL (ref 65–140)
HCT VFR BLD AUTO: 43.8 % (ref 36.5–49.3)
HGB BLD-MCNC: 14.4 G/DL (ref 12–17)
IMM GRANULOCYTES # BLD AUTO: 0.01 THOUSAND/UL (ref 0–0.2)
IMM GRANULOCYTES NFR BLD AUTO: 0 % (ref 0–2)
LYMPHOCYTES # BLD AUTO: 1.49 THOUSANDS/ΜL (ref 0.6–4.47)
LYMPHOCYTES NFR BLD AUTO: 27 % (ref 14–44)
MCH RBC QN AUTO: 31.6 PG (ref 26.8–34.3)
MCHC RBC AUTO-ENTMCNC: 32.9 G/DL (ref 31.4–37.4)
MCV RBC AUTO: 96 FL (ref 82–98)
MONOCYTES # BLD AUTO: 0.78 THOUSAND/ΜL (ref 0.17–1.22)
MONOCYTES NFR BLD AUTO: 14 % (ref 4–12)
NEUTROPHILS # BLD AUTO: 3.16 THOUSANDS/ΜL (ref 1.85–7.62)
NEUTS SEG NFR BLD AUTO: 57 % (ref 43–75)
NRBC BLD AUTO-RTO: 0 /100 WBCS
P AXIS: 73 DEGREES
PLATELET # BLD AUTO: 252 THOUSANDS/UL (ref 149–390)
PMV BLD AUTO: 9.7 FL (ref 8.9–12.7)
POTASSIUM SERPL-SCNC: 4 MMOL/L (ref 3.5–5.3)
PR INTERVAL: 156 MS
PROT SERPL-MCNC: 7.2 G/DL (ref 6.4–8.2)
QRS AXIS: 77 DEGREES
QRSD INTERVAL: 92 MS
QT INTERVAL: 358 MS
QTC INTERVAL: 408 MS
RBC # BLD AUTO: 4.56 MILLION/UL (ref 3.88–5.62)
SODIUM SERPL-SCNC: 139 MMOL/L (ref 136–145)
T WAVE AXIS: 71 DEGREES
TROPONIN I SERPL-MCNC: <0.02 NG/ML
VENTRICULAR RATE: 78 BPM
WBC # BLD AUTO: 5.51 THOUSAND/UL (ref 4.31–10.16)

## 2019-01-26 PROCEDURE — 94644 CONT INHLJ TX 1ST HOUR: CPT

## 2019-01-26 PROCEDURE — 71046 X-RAY EXAM CHEST 2 VIEWS: CPT

## 2019-01-26 PROCEDURE — 96374 THER/PROPH/DIAG INJ IV PUSH: CPT

## 2019-01-26 PROCEDURE — 99223 1ST HOSP IP/OBS HIGH 75: CPT | Performed by: INTERNAL MEDICINE

## 2019-01-26 PROCEDURE — 84484 ASSAY OF TROPONIN QUANT: CPT | Performed by: EMERGENCY MEDICINE

## 2019-01-26 PROCEDURE — 85025 COMPLETE CBC W/AUTO DIFF WBC: CPT | Performed by: EMERGENCY MEDICINE

## 2019-01-26 PROCEDURE — 93010 ELECTROCARDIOGRAM REPORT: CPT | Performed by: INTERNAL MEDICINE

## 2019-01-26 PROCEDURE — 94760 N-INVAS EAR/PLS OXIMETRY 1: CPT

## 2019-01-26 PROCEDURE — 99285 EMERGENCY DEPT VISIT HI MDM: CPT

## 2019-01-26 PROCEDURE — 93005 ELECTROCARDIOGRAM TRACING: CPT

## 2019-01-26 PROCEDURE — 94640 AIRWAY INHALATION TREATMENT: CPT

## 2019-01-26 PROCEDURE — 80053 COMPREHEN METABOLIC PANEL: CPT | Performed by: EMERGENCY MEDICINE

## 2019-01-26 PROCEDURE — 36415 COLL VENOUS BLD VENIPUNCTURE: CPT | Performed by: EMERGENCY MEDICINE

## 2019-01-26 RX ORDER — ALBUTEROL SULFATE 2.5 MG/3ML
2.5 SOLUTION RESPIRATORY (INHALATION) EVERY 6 HOURS PRN
Status: DISCONTINUED | OUTPATIENT
Start: 2019-01-26 | End: 2019-01-27 | Stop reason: HOSPADM

## 2019-01-26 RX ORDER — AZITHROMYCIN 250 MG/1
500 TABLET, FILM COATED ORAL EVERY 24 HOURS
Status: DISCONTINUED | OUTPATIENT
Start: 2019-01-26 | End: 2019-01-27 | Stop reason: HOSPADM

## 2019-01-26 RX ORDER — ACETAMINOPHEN 325 MG/1
650 TABLET ORAL EVERY 6 HOURS PRN
COMMUNITY
End: 2022-05-18 | Stop reason: ALTCHOICE

## 2019-01-26 RX ORDER — NICOTINE 21 MG/24HR
1 PATCH, TRANSDERMAL 24 HOURS TRANSDERMAL DAILY
Status: DISCONTINUED | OUTPATIENT
Start: 2019-01-26 | End: 2019-01-27 | Stop reason: HOSPADM

## 2019-01-26 RX ORDER — ONDANSETRON 2 MG/ML
4 INJECTION INTRAMUSCULAR; INTRAVENOUS EVERY 8 HOURS PRN
Status: DISCONTINUED | OUTPATIENT
Start: 2019-01-26 | End: 2019-01-27 | Stop reason: HOSPADM

## 2019-01-26 RX ORDER — SODIUM CHLORIDE FOR INHALATION 0.9 %
3 VIAL, NEBULIZER (ML) INHALATION ONCE
Status: COMPLETED | OUTPATIENT
Start: 2019-01-26 | End: 2019-01-26

## 2019-01-26 RX ORDER — METHYLPREDNISOLONE SODIUM SUCCINATE 125 MG/2ML
125 INJECTION, POWDER, LYOPHILIZED, FOR SOLUTION INTRAMUSCULAR; INTRAVENOUS ONCE
Status: COMPLETED | OUTPATIENT
Start: 2019-01-26 | End: 2019-01-26

## 2019-01-26 RX ORDER — METHYLPREDNISOLONE SODIUM SUCCINATE 40 MG/ML
40 INJECTION, POWDER, LYOPHILIZED, FOR SOLUTION INTRAMUSCULAR; INTRAVENOUS EVERY 8 HOURS
Status: DISCONTINUED | OUTPATIENT
Start: 2019-01-26 | End: 2019-01-26

## 2019-01-26 RX ORDER — LEVALBUTEROL 1.25 MG/.5ML
1.25 SOLUTION, CONCENTRATE RESPIRATORY (INHALATION)
Status: DISCONTINUED | OUTPATIENT
Start: 2019-01-26 | End: 2019-01-27 | Stop reason: HOSPADM

## 2019-01-26 RX ORDER — METHYLPREDNISOLONE SODIUM SUCCINATE 40 MG/ML
40 INJECTION, POWDER, LYOPHILIZED, FOR SOLUTION INTRAMUSCULAR; INTRAVENOUS EVERY 8 HOURS
Status: DISCONTINUED | OUTPATIENT
Start: 2019-01-26 | End: 2019-01-27

## 2019-01-26 RX ORDER — IPRATROPIUM BROMIDE AND ALBUTEROL SULFATE 2.5; .5 MG/3ML; MG/3ML
3 SOLUTION RESPIRATORY (INHALATION)
Status: DISCONTINUED | OUTPATIENT
Start: 2019-01-26 | End: 2019-01-26

## 2019-01-26 RX ORDER — ACETAMINOPHEN 325 MG/1
650 TABLET ORAL EVERY 6 HOURS PRN
Status: DISCONTINUED | OUTPATIENT
Start: 2019-01-26 | End: 2019-01-27 | Stop reason: HOSPADM

## 2019-01-26 RX ADMIN — ISODIUM CHLORIDE 3 ML: 0.03 SOLUTION RESPIRATORY (INHALATION) at 09:01

## 2019-01-26 RX ADMIN — ALBUTEROL SULFATE 10 MG: 2.5 SOLUTION RESPIRATORY (INHALATION) at 09:02

## 2019-01-26 RX ADMIN — ENOXAPARIN SODIUM 40 MG: 40 INJECTION SUBCUTANEOUS at 11:05

## 2019-01-26 RX ADMIN — LEVALBUTEROL 1.25 MG: 1.25 SOLUTION, CONCENTRATE RESPIRATORY (INHALATION) at 19:51

## 2019-01-26 RX ADMIN — IPRATROPIUM BROMIDE 0.5 MG: 0.5 SOLUTION RESPIRATORY (INHALATION) at 11:06

## 2019-01-26 RX ADMIN — LEVALBUTEROL 1.25 MG: 1.25 SOLUTION, CONCENTRATE RESPIRATORY (INHALATION) at 14:38

## 2019-01-26 RX ADMIN — IPRATROPIUM BROMIDE 1 MG: 0.5 SOLUTION RESPIRATORY (INHALATION) at 09:02

## 2019-01-26 RX ADMIN — ACETAMINOPHEN 650 MG: 325 TABLET, FILM COATED ORAL at 11:49

## 2019-01-26 RX ADMIN — METHYLPREDNISOLONE SODIUM SUCCINATE 40 MG: 40 INJECTION, POWDER, FOR SOLUTION INTRAMUSCULAR; INTRAVENOUS at 16:50

## 2019-01-26 RX ADMIN — IPRATROPIUM BROMIDE 0.5 MG: 0.5 SOLUTION RESPIRATORY (INHALATION) at 14:38

## 2019-01-26 RX ADMIN — METHYLPREDNISOLONE SODIUM SUCCINATE 125 MG: 125 INJECTION, POWDER, FOR SOLUTION INTRAMUSCULAR; INTRAVENOUS at 09:00

## 2019-01-26 RX ADMIN — IPRATROPIUM BROMIDE 0.5 MG: 0.5 SOLUTION RESPIRATORY (INHALATION) at 19:51

## 2019-01-26 RX ADMIN — NICOTINE 1 PATCH: 14 PATCH TRANSDERMAL at 11:05

## 2019-01-26 RX ADMIN — AZITHROMYCIN MONOHYDRATE 500 MG: 250 TABLET ORAL at 11:06

## 2019-01-26 NOTE — H&P
INTERNAL MEDICINE HISTORY AND PHYSICAL  ED 05 SOD Team A    NAME: Milana Bernheim  AGE: 46 y o  SEX: male  : 1965   MRN: [id number]  ENCOUNTER: [id number]    DATE: 2019  TIME: 10:09 AM    Primary Care Physician: No primary care provider on file  Admitting Provider: Manny Smith MD    Chief complaint: SOB    History of Present Illness     Khai Lopez is a 46 y o  male with 40 pack-year smoking history and recent history of alcohol dependence (quit 6 months prior) who presents with a few days of shortness of breath, cough productive of sputum, wheezing  The patient reports he was recently in the emergency department on  for similar symptoms and he left at that time as he did not want to get admitted  He returns for similar symptoms and worsening shortness of breath now at rest   He has been in and out of half-way for the past 10 years and has not seen any doctor for medical care  He reports chills days prior but none currently  Does report multiple sick contacts at his rehab facility  At baseline, reports cough in AM with cigarette but no SOB at rest or with exertion  In the emergency department, he received albuterol, ipratropium, IV Solu-Medrol 125 mg  Chest x-ray showed hyperinflated lungs with flattened diaphragm consistent with possible COPD (official read pending)  Review of Systems   Review of Systems   Constitutional: Negative for diaphoresis and fever  HENT: Negative for rhinorrhea, sneezing and sore throat  Respiratory: Positive for shortness of breath and wheezing  Cardiovascular: Negative for chest pain and leg swelling  Gastrointestinal: Negative for abdominal distention and abdominal pain  Neurological: Negative for dizziness and light-headedness  Past Medical History   History reviewed  No pertinent past medical history  Past Surgical History   History reviewed  No pertinent surgical history      Social History     History   Alcohol Use    Yes     Comment: "recovery for 5 months"     History   Drug Use No     History   Smoking Status    Current Every Day Smoker    Packs/day: 1 00    Years: 40 00    Types: Cigarettes   Smokeless Tobacco    Never Used       Family History   History reviewed  No pertinent family history  Medications Prior to Admission     Prior to Admission medications    Not on File       Allergies   No Known Allergies    Objective     Vitals:    01/26/19 0808 01/26/19 0915 01/26/19 1005   BP: 142/91 114/84 125/80   BP Location: Left arm     Pulse: 78 72 84   Resp: 21 20 20   Temp: (!) 97 2 °F (36 2 °C)     TempSrc: Oral     SpO2: 96% 96% 94%   Weight: 81 6 kg (180 lb)     Height: 6' 1" (1 854 m)       Body mass index is 23 75 kg/m²  No intake or output data in the 24 hours ending 01/26/19 1009  Invasive Devices     Peripheral Intravenous Line            Peripheral IV 01/26/19 Left Antecubital less than 1 day                Physical Exam  GENERAL: Appears well-developed and well-nourished  Receiving nebulizer treatment  HEENT: Normocephalic and atraumatic  No scleral icterus  PERRLA  EOMI B/L  No oropharyngeal edema  MM moist    NECK: Neck supple with no lymphadenopathy  Trachea midline  No JVD  CARDIOVASCULAR: S1 and S2 are present  Regular rate and rhythm  No murmurs, rubs, or gallops  RESPIRATORY: Diffuse wheezing bilaterally  Normal respiratory expansion  ABDOMINAL: Bowel sounds present in all 4 quadrants, non-tender, soft, non-distended  No organomegaly, rebound, or guarding  EXTREMITIES: 2+ DP and PT pulses bilaterally; no cyanosis, clubbing, edema  ROM intact  HERBERT x4   MUSCULOSKELETAL: No joint tenderness, deformity or swelling, full range of motion without pain  NEUROLOGIC: Patient is alert and oriented to person, place, and time  No sensory or motor deficits  CN 2-12 intact  Plantars downgoing bilaterally  Speech fluent  SKIN: Skin is warm and dry  No skin lesions are present  No rashes     PSYCHIATRIC: Normal mood and affect     Lab Results: I have personally reviewed pertinent reports  CBC:   Results from last 7 days  Lab Units 01/26/19  0900   WBC Thousand/uL 5 51   RBC Million/uL 4 56   HEMOGLOBIN g/dL 14 4   HEMATOCRIT % 43 8   MCV fL 96   MCH pg 31 6   MCHC g/dL 32 9   RDW % 12 5   MPV fL 9 7   PLATELETS Thousands/uL 252   NRBC AUTO /100 WBCs 0   NEUTROS PCT % 57   LYMPHS PCT % 27   MONOS PCT % 14*   EOS PCT % 1   BASOS PCT % 1   NEUTROS ABS Thousands/µL 3 16   LYMPHS ABS Thousands/µL 1 49   MONOS ABS Thousand/µL 0 78   EOS ABS Thousand/µL 0 03   , Chemistry Profile:   Results from last 7 days  Lab Units 01/26/19  0900   POTASSIUM mmol/L 4 0   CHLORIDE mmol/L 108   CO2 mmol/L 27   BUN mg/dL 14   CREATININE mg/dL 0 87   CALCIUM mg/dL 8 4   AST U/L 26   ALT U/L 24   ALK PHOS U/L 75   EGFR ml/min/1 73sq m 98       Imaging: I have personally reviewed pertinent films in PACS  X-ray Chest 2 Views    Result Date: 1/25/2019  Narrative: CHEST INDICATION:   chest pain  Cough and congestion for 2 days  COMPARISON:  None EXAM PERFORMED/VIEWS:  XR CHEST PA & LATERAL  The frontal view was performed utilizing dual energy radiographic technique  FINDINGS: Cardiomediastinal silhouette appears unremarkable  The lungs are clear  No pneumothorax or pleural effusion  Osseous structures appear within normal limits for patient age  Impression: No acute cardiopulmonary disease  Workstation performed: [id number]     Urinalysis:       Invalid input(s): URIBILINOGEN     Urine Micro:        EKG, Pathology, and Other Studies: I have personally reviewed pertinent reports        Medications given in Emergency Department     Medication Administration - last 24 hours from 01/25/2019 1009 to 01/26/2019 1009       Date/Time Order Dose Route Action Action by     01/26/2019 0902 albuterol inhalation solution 10 mg 10 mg Nebulization Given Lonza Patrick, RT     01/26/2019 0902 ipratropium (ATROVENT) 0 02 % inhalation solution 1 mg 1 mg Nebulization Given Ap Person, RT     01/26/2019 0901 sodium chloride 0 9 % inhalation solution 3 mL 3 mL Nebulization Given Ap Person, RT     01/26/2019 0900 methylPREDNISolone sodium succinate (Solu-MEDROL) injection 125 mg 125 mg Intravenous Given Renato Jurado RN          Assessment and Plan     1 ) SOB: Likely 2/2 undiagnosed COPD with exacerbation  · Start Azithromycin, IV Solumedrol 40 mg TiD, Ipratropium, and albuterol PRN  · Respiratory protocol  · Monitor respiratory status      2 ) Nicotine dependence  · Nicoderm patch    3 ) Alcohol dependence: Last drink 6 months ago and currently lives in rehab facility    Code Status: Level 1 - Full Code  VTE Pharmacologic Prophylaxis: Enoxaparin (Lovenox)   VTE Mechanical Prophylaxis: sequential compression device  Admission Status: OBSERVATION    Admission Time  I spent 30 minutes admitting the patient  This involved direct patient contact where I performed a full history and physical, reviewing previous records, and reviewing laboratory and other diagnostic studies      Tapan Higuera MD  Internal Medicine  PGY-1

## 2019-01-26 NOTE — ED NOTES
Patient currently refusing IV or lab work, Dr Jaja Irene notified     Charm Gowers, BRYAN  01/26/19 7575

## 2019-01-26 NOTE — RESPIRATORY THERAPY NOTE
RT Protocol Note  Khai Gibson Feeling 46 y o  male MRN: [id number]  Unit/Bed#: ED 05 Encounter: [id number]    Assessment    Principal Problem:    Chronic obstructive pulmonary disease with acute exacerbation ([address] )  Active Problems:    Nicotine dependence    Alcohol dependence in remission [facility])      Home Pulmonary Medications:  none       History reviewed  No pertinent past medical history  Social History     Social History    Marital status: Legally      Spouse name: N/A    Number of children: N/A    Years of education: N/A     Social History Main Topics    Smoking status: Current Every Day Smoker     Packs/day: 1 00     Years: 40 00     Types: Cigarettes    Smokeless tobacco: Never Used    Alcohol use Yes      Comment: "recovery for 5 months"    Drug use: No    Sexual activity: Not Asked     Other Topics Concern    None     Social History Narrative    None       Subjective         Objective    Physical Exam:   Assessment Type: (P) Assess only  General Appearance: (P) Alert, Awake  Respiratory Pattern: (P) Normal  Chest Assessment: (P) Chest expansion symmetrical  Bilateral Breath Sounds: (P) Expiratory wheezes, Inspiratory wheezes    Vitals:  Blood pressure 130/74, pulse 92, temperature (!) 97 2 °F (36 2 °C), temperature source Oral, resp  rate 20, height 6' 1" (1 854 m), weight 81 6 kg (180 lb), SpO2 98 %  Imaging and other studies: I have personally reviewed pertinent reports  Plan    Respiratory Plan: (P) Mild Distress pathway        Resp Comments: (P) Pt came to ER with sob  Recent sign out AMA for sob as well  He states he is feeling better after the UDN, however he still has some wheezing  I am giveing him another UDN, as well as starting a frequency as the plan is to admit him  CXR is unavailable to review  Pt denies any pulm hx or bronchodilator use at home

## 2019-01-26 NOTE — ED PROVIDER NOTES
History  Chief Complaint   Patient presents with    Chest Pain     Pt seen two days ago for CP and and SOB  Pt left AMA d/t anxiety  Pt smoking prior to triage and c/o lung pain and SOB       Patient is a 50-year-old with a history of heavy smoking and possible COPD exacerbation  See never formally diagnosed) that presents with cough  Patient says that he has had 4 days of increasing productive cough of yellow sputum  He complains of associated dyspnea but denies any chest pain  Patient was seen here 2 days ago for the symptoms and was treated symptomatically with breathing treatments, steroids, antibiotics  Team wanted to admit the patient but he signed out AMA  Since that time, the patient initially felt better but then subsequently developed a worsening cough and dyspnea  He has been able to stay hydrated over the past several days and denies any nausea, vomiting  He denies any bowel symptoms including diarrhea, melena, hematochezia  Denies any urinary symptoms including hematuria, dysuria  He does admit to sick contacts as he lives in the alcohol abuse rehab home  None       History reviewed  No pertinent past medical history  History reviewed  No pertinent surgical history  History reviewed  No pertinent family history  I have reviewed and agree with the history as documented  Social History   Substance Use Topics    Smoking status: Current Every Day Smoker     Packs/day: 1 00     Years: 40 00     Types: Cigarettes    Smokeless tobacco: Never Used    Alcohol use Yes      Comment: "recovery for 5 months"        Review of Systems   Constitutional: Negative for chills, diaphoresis, fatigue and fever  HENT: Negative for drooling, facial swelling, sore throat and trouble swallowing  Eyes: Negative for photophobia  Respiratory: Positive for cough, shortness of breath and wheezing  Negative for choking, chest tightness and stridor      Cardiovascular: Negative for chest pain, palpitations and leg swelling  Gastrointestinal: Negative for abdominal distention, abdominal pain, diarrhea, nausea and vomiting  Genitourinary: Negative for dysuria  Musculoskeletal: Negative for back pain, neck pain and neck stiffness  Skin: Negative for color change, pallor and rash  Neurological: Negative for dizziness, speech difficulty, weakness, light-headedness, numbness and headaches  Hematological: Negative for adenopathy  Psychiatric/Behavioral: Negative for agitation  All other systems reviewed and are negative  Physical Exam  ED Triage Vitals [01/26/19 0808]   Temperature Pulse Respirations Blood Pressure SpO2   (!) 97 2 °F (36 2 °C) 78 21 142/91 96 %      Temp Source Heart Rate Source Patient Position - Orthostatic VS BP Location FiO2 (%)   Oral Monitor Sitting Left arm --      Pain Score       3           Orthostatic Vital Signs  Vitals:    01/26/19 0808 01/26/19 0915 01/26/19 1005   BP: 142/91 114/84 125/80   Pulse: 78 72 84   Patient Position - Orthostatic VS: Sitting         Physical Exam   Constitutional: He is oriented to person, place, and time  He appears well-developed and well-nourished  No distress  HENT:   Head: Normocephalic  Eyes: Pupils are equal, round, and reactive to light  EOM are normal    Neck: Normal range of motion  Neck supple  Cardiovascular: Normal rate, regular rhythm, normal heart sounds and intact distal pulses  Exam reveals no gallop and no friction rub  No murmur heard  Pulmonary/Chest: Effort normal and breath sounds normal    Mildly increased work of breathing  Wheezing heard throughout the lungs  No   Abdominal: Soft  Bowel sounds are normal  He exhibits no distension  There is no tenderness  There is no guarding  Musculoskeletal: Normal range of motion  Neurological: He is alert and oriented to person, place, and time  No cranial nerve deficit or sensory deficit  He exhibits normal muscle tone     Skin: Capillary refill takes less than 2 seconds  Psychiatric: He has a normal mood and affect  His behavior is normal  Judgment and thought content normal    Vitals reviewed  ED Medications  Medications   albuterol inhalation solution 10 mg (10 mg Nebulization Given 1/26/19 0902)     And   ipratropium (ATROVENT) 0 02 % inhalation solution 1 mg (1 mg Nebulization Given 1/26/19 0902)     And   sodium chloride 0 9 % inhalation solution 3 mL (3 mL Nebulization Given 1/26/19 0901)   methylPREDNISolone sodium succinate (Solu-MEDROL) injection 125 mg (125 mg Intravenous Given 1/26/19 0900)       Diagnostic Studies  Results Reviewed     Procedure Component Value Units Date/Time    Comprehensive metabolic panel [[id number]] Collected:  01/26/19 0900    Lab Status:  Final result Specimen:  Blood from Arm, Left Updated:  01/26/19 0946     Sodium 139 mmol/L      Potassium 4 0 mmol/L      Chloride 108 mmol/L      CO2 27 mmol/L      ANION GAP 4 mmol/L      BUN 14 mg/dL      Creatinine 0 87 mg/dL      Glucose 90 mg/dL      Calcium 8 4 mg/dL      AST 26 U/L      ALT 24 U/L      Alkaline Phosphatase 75 U/L      Total Protein 7 2 g/dL      Albumin 3 9 g/dL      Total Bilirubin 0 24 mg/dL      eGFR 98 ml/min/1 73sq m     Narrative:         National Kidney Disease Education Program recommendations are as follows:  GFR calculation is accurate only with a steady state creatinine  Chronic Kidney disease less than 60 ml/min/1 73 sq  meters  Kidney failure less than 15 ml/min/1 73 sq  meters      Troponin I [[id number]]  (Normal) Collected:  01/26/19 0900    Lab Status:  Final result Specimen:  Blood from Arm, Left Updated:  01/26/19 0927     Troponin I <0 02 ng/mL     CBC and differential [[id number]]  (Abnormal) Collected:  01/26/19 0900    Lab Status:  Final result Specimen:  Blood from Arm, Left Updated:  01/26/19 0919     WBC 5 51 Thousand/uL      RBC 4 56 Million/uL      Hemoglobin 14 4 g/dL      Hematocrit 43 8 %      MCV 96 fL      MCH 31 6 pg      MCHC 32 9 g/dL      RDW 12 5 %      MPV 9 7 fL      Platelets 051 Thousands/uL      nRBC 0 /100 WBCs      Neutrophils Relative 57 %      Immat GRANS % 0 %      Lymphocytes Relative 27 %      Monocytes Relative 14 (H) %      Eosinophils Relative 1 %      Basophils Relative 1 %      Neutrophils Absolute 3 16 Thousands/µL      Immature Grans Absolute 0 01 Thousand/uL      Lymphocytes Absolute 1 49 Thousands/µL      Monocytes Absolute 0 78 Thousand/µL      Eosinophils Absolute 0 03 Thousand/µL      Basophils Absolute 0 04 Thousands/µL                  XR chest 2 views   ED Interpretation by Basil Ziegler MD (01/26 1505)   ED wet read:  No acute cardiopulmonary process noted  Procedures  ECG 12 Lead Documentation  Date/Time: 1/26/2019 10:08 AM  Performed by: Carlos Agent by: Claude Foster     ECG reviewed by me, the ED Provider: yes    Patient location:  ED  Previous ECG:     Previous ECG:  Compared to current    Similarity:  No change    Comparison to cardiac monitor: Yes    Interpretation:     Interpretation: normal    Rate:     ECG rate assessment: normal    Rhythm:     Rhythm: sinus rhythm    Ectopy:     Ectopy: none    QRS:     QRS axis:  Normal    QRS intervals:  Normal  Conduction:     Conduction: normal    ST segments:     ST segments:  Normal  T waves:     T waves: normal            Phone Consults  ED Phone Contact    ED Course                               MDM  Number of Diagnoses or Management Options  Bronchitis: new and requires workup  COPD with acute exacerbation [facility]): new and requires workup  Diagnosis management comments:  Patient is a 50-year-old male who presents with a productive cough, rhinorrhea, dyspnea consistent with both a COPD exacerbation and bronchitis  Patient was treated with a reddy nebulizer and methylprednisolone but still is endorsing dyspnea at rest and wheezing  Patient will be admitted for treatment of COPD exacerbation and bronchitis     Patient was hemodynamically stable throughout his time here in the emergency department  Workup was otherwise negative including troponin, EKG, chest x-ray  Amount and/or Complexity of Data Reviewed  Clinical lab tests: ordered and reviewed  Tests in the radiology section of CPT®: ordered and reviewed    Risk of Complications, Morbidity, and/or Mortality  Presenting problems: low  Diagnostic procedures: low  Management options: low    Patient Progress  Patient progress: improved    CritCare Time    Disposition  Final diagnoses:   COPD with acute exacerbation ([facility] )   Bronchitis     Time reflects when diagnosis was documented in both MDM as applicable and the Disposition within this note     Time User Action Codes Description Comment    1/26/2019  9:59 AM Chris Perry Add [J44 1] COPD with acute exacerbation ([facility] )     1/26/2019  9:59 AM Chris Harrison Add [J40] Bronchitis       ED Disposition     ED Disposition Condition Comment    Admit  Case was discussed with SOD and the patient's admission status was agreed to be Admission Status: observation status to the service of Dr Jacqueline Velez   Follow-up Information    None         Patient's Medications    No medications on file     No discharge procedures on file  ED Provider  Attending physically available and evaluated Marrion Falling  I managed the patient along with the ED Attending      Electronically Signed by         Colette Winkler MD  01/26/19 8295

## 2019-01-26 NOTE — ED NOTES
Patient refusing to wear cardiac leads, bp cuff or pulse ox  States "if someone else talks to me the way that other person did, I'll walk out of here"  Patient notified that due to his admission into the hospital, he is required to have his vitals monitored  Patient continues to refuse       Franny Joaquin RN  01/26/19 6268

## 2019-01-26 NOTE — ED ATTENDING ATTESTATION
Vic Keyes MD, saw and evaluated the patient  I have discussed the patient with the resident/non-physician practitioner and agree with the resident's/non-physician practitioner's findings, Plan of Care, and MDM as documented in the resident's/non-physician practitioner's note, except where noted  All available labs and Radiology studies were reviewed  At this point I agree with the current assessment done in the Emergency Department  I have conducted an independent evaluation of this patient a history and physical is as follows:    76-year-old male presents with shortness of breath, cough of 4 days duration  She is productive for yellow sputum  History of heavy tobacco use  Is due to be admitted 2 or 3 days ago for the same but signed out AMA due to anxiety about staying in the hospital   He states his breathing has gotten worse and he has no medications at home so he re-presented to the hospital for treatment  On exam he is tachypneic with increased work of breathing mildly so  He is diffuse rhonchi and wheezing  She will get chest x-ray, EKG, labs, continuous bronchodilator via nebulizer, IV steroids, monitor closely for initiation of BiPAP  Admit  Critical Care Time  The patient presented with a condition in which there was a high probability of imminent or life-threatening deterioration, and critical care services (excluding separately billable procedures) totalled 30-74 minutes          Procedures

## 2019-01-26 NOTE — PLAN OF CARE
DISCHARGE PLANNING     Discharge to home or other facility with appropriate resources Progressing        INFECTION - ADULT     Absence or prevention of progression during hospitalization Progressing        Knowledge Deficit     Patient/family/caregiver demonstrates understanding of disease process, treatment plan, medications, and discharge instructions Progressing        PAIN - ADULT     Verbalizes/displays adequate comfort level or baseline comfort level Progressing        RESPIRATORY - ADULT     Achieves optimal ventilation and oxygenation Progressing        SUBSTANCE USE/ABUSE     By discharge, will develop insight into their chemical dependency and sustain motivation to continue in recovery Progressing

## 2019-01-27 VITALS
HEIGHT: 73 IN | DIASTOLIC BLOOD PRESSURE: 76 MMHG | TEMPERATURE: 98.4 F | WEIGHT: 174.6 LBS | HEART RATE: 93 BPM | SYSTOLIC BLOOD PRESSURE: 128 MMHG | RESPIRATION RATE: 18 BRPM | OXYGEN SATURATION: 90 % | BODY MASS INDEX: 23.14 KG/M2

## 2019-01-27 LAB
ANION GAP SERPL CALCULATED.3IONS-SCNC: 7 MMOL/L (ref 4–13)
BASOPHILS # BLD AUTO: 0.01 THOUSANDS/ΜL (ref 0–0.1)
BASOPHILS NFR BLD AUTO: 0 % (ref 0–1)
BUN SERPL-MCNC: 12 MG/DL (ref 5–25)
CALCIUM SERPL-MCNC: 8.6 MG/DL (ref 8.3–10.1)
CHLORIDE SERPL-SCNC: 112 MMOL/L (ref 100–108)
CO2 SERPL-SCNC: 23 MMOL/L (ref 21–32)
CREAT SERPL-MCNC: 0.74 MG/DL (ref 0.6–1.3)
EOSINOPHIL # BLD AUTO: 0 THOUSAND/ΜL (ref 0–0.61)
EOSINOPHIL NFR BLD AUTO: 0 % (ref 0–6)
ERYTHROCYTE [DISTWIDTH] IN BLOOD BY AUTOMATED COUNT: 12.6 % (ref 11.6–15.1)
GFR SERPL CREATININE-BSD FRML MDRD: 105 ML/MIN/1.73SQ M
GLUCOSE P FAST SERPL-MCNC: 111 MG/DL (ref 65–99)
GLUCOSE SERPL-MCNC: 111 MG/DL (ref 65–140)
HCT VFR BLD AUTO: 46.1 % (ref 36.5–49.3)
HGB BLD-MCNC: 14.5 G/DL (ref 12–17)
IMM GRANULOCYTES # BLD AUTO: 0.05 THOUSAND/UL (ref 0–0.2)
IMM GRANULOCYTES NFR BLD AUTO: 1 % (ref 0–2)
LYMPHOCYTES # BLD AUTO: 0.95 THOUSANDS/ΜL (ref 0.6–4.47)
LYMPHOCYTES NFR BLD AUTO: 10 % (ref 14–44)
MCH RBC QN AUTO: 32.1 PG (ref 26.8–34.3)
MCHC RBC AUTO-ENTMCNC: 31.5 G/DL (ref 31.4–37.4)
MCV RBC AUTO: 102 FL (ref 82–98)
MONOCYTES # BLD AUTO: 0.49 THOUSAND/ΜL (ref 0.17–1.22)
MONOCYTES NFR BLD AUTO: 5 % (ref 4–12)
NEUTROPHILS # BLD AUTO: 7.88 THOUSANDS/ΜL (ref 1.85–7.62)
NEUTS SEG NFR BLD AUTO: 84 % (ref 43–75)
NRBC BLD AUTO-RTO: 0 /100 WBCS
PLATELET # BLD AUTO: 262 THOUSANDS/UL (ref 149–390)
PMV BLD AUTO: 9.5 FL (ref 8.9–12.7)
POTASSIUM SERPL-SCNC: 4.4 MMOL/L (ref 3.5–5.3)
RBC # BLD AUTO: 4.52 MILLION/UL (ref 3.88–5.62)
SODIUM SERPL-SCNC: 142 MMOL/L (ref 136–145)
WBC # BLD AUTO: 9.38 THOUSAND/UL (ref 4.31–10.16)

## 2019-01-27 PROCEDURE — 99233 SBSQ HOSP IP/OBS HIGH 50: CPT | Performed by: INTERNAL MEDICINE

## 2019-01-27 PROCEDURE — 85025 COMPLETE CBC W/AUTO DIFF WBC: CPT | Performed by: INTERNAL MEDICINE

## 2019-01-27 PROCEDURE — 87631 RESP VIRUS 3-5 TARGETS: CPT | Performed by: INTERNAL MEDICINE

## 2019-01-27 PROCEDURE — 94640 AIRWAY INHALATION TREATMENT: CPT | Performed by: SOCIAL WORKER

## 2019-01-27 PROCEDURE — 94668 MNPJ CHEST WALL SBSQ: CPT | Performed by: SOCIAL WORKER

## 2019-01-27 PROCEDURE — 94664 DEMO&/EVAL PT USE INHALER: CPT | Performed by: SOCIAL WORKER

## 2019-01-27 PROCEDURE — 80048 BASIC METABOLIC PNL TOTAL CA: CPT | Performed by: INTERNAL MEDICINE

## 2019-01-27 RX ORDER — ALBUTEROL SULFATE 90 UG/1
2 AEROSOL, METERED RESPIRATORY (INHALATION) EVERY 6 HOURS PRN
Qty: 18 G | Refills: 0 | Status: SHIPPED | OUTPATIENT
Start: 2019-01-27 | End: 2020-01-31 | Stop reason: SDUPTHER

## 2019-01-27 RX ORDER — AZITHROMYCIN 250 MG/1
250 TABLET, FILM COATED ORAL EVERY 24 HOURS
Qty: 3 TABLET | Refills: 0 | Status: SHIPPED | OUTPATIENT
Start: 2019-01-28 | End: 2019-01-31

## 2019-01-27 RX ORDER — METHYLPREDNISOLONE SODIUM SUCCINATE 40 MG/ML
40 INJECTION, POWDER, LYOPHILIZED, FOR SOLUTION INTRAMUSCULAR; INTRAVENOUS EVERY 12 HOURS SCHEDULED
Status: DISCONTINUED | OUTPATIENT
Start: 2019-01-27 | End: 2019-01-27 | Stop reason: HOSPADM

## 2019-01-27 RX ORDER — PREDNISONE 20 MG/1
40 TABLET ORAL DAILY
Qty: 6 TABLET | Refills: 0 | Status: SHIPPED | OUTPATIENT
Start: 2019-01-27 | End: 2019-01-30

## 2019-01-27 RX ADMIN — METHYLPREDNISOLONE SODIUM SUCCINATE 40 MG: 40 INJECTION, POWDER, FOR SOLUTION INTRAMUSCULAR; INTRAVENOUS at 01:19

## 2019-01-27 RX ADMIN — IPRATROPIUM BROMIDE 0.5 MG: 0.5 SOLUTION RESPIRATORY (INHALATION) at 07:30

## 2019-01-27 RX ADMIN — IPRATROPIUM BROMIDE 0.5 MG: 0.5 SOLUTION RESPIRATORY (INHALATION) at 13:33

## 2019-01-27 RX ADMIN — AZITHROMYCIN MONOHYDRATE 500 MG: 250 TABLET ORAL at 13:49

## 2019-01-27 RX ADMIN — NICOTINE 1 PATCH: 14 PATCH TRANSDERMAL at 09:27

## 2019-01-27 RX ADMIN — LEVALBUTEROL 1.25 MG: 1.25 SOLUTION, CONCENTRATE RESPIRATORY (INHALATION) at 07:30

## 2019-01-27 RX ADMIN — LEVALBUTEROL 1.25 MG: 1.25 SOLUTION, CONCENTRATE RESPIRATORY (INHALATION) at 13:33

## 2019-01-27 NOTE — RESPIRATORY THERAPY NOTE
resp care      01/27/19 1333   Inhalation Therapy Tx   $ Inhalation Therapy Performed Yes   Pre-Treatment Pulse 68   Breath Sounds Pre-Treatment Bilateral Diminished;Clear   Breath Sounds Post-Treatment Bilateral Diminished;Clear   Post-Treatment Pulse 72   Resp Comments pt c/o congestion  states the mucus is deep in his lungs  Pt instructed on flutter valve for use at home

## 2019-01-27 NOTE — UTILIZATION REVIEW
Initial Clinical Review    Admission: Date/Time/Statement: 01/26/19 @ 1000 -- OBS  Orders Placed This Encounter   Procedures    Place in Observation (expected length of stay for this patient is less than two midnights)     Standing Status:   Standing     Number of Occurrences:   1     Order Specific Question:   Admitting Physician     Answer:   ANIRUDH MANCERA [[id number]]     Order Specific Question:   Level of Care     Answer:   Med Surg [16]     ED: Date/Time/Mode of Arrival:   ED Arrival Information     Expected Arrival Acuity Means of Arrival Escorted By Service Admission Type    - 1/26/2019 08:02 Emergent Walk-In Family Member General Medicine Emergency    Arrival Complaint    sob/chest pain        Chief Complaint:   Chief Complaint   Patient presents with    Chest Pain     Pt seen two days ago for CP and and SOB  Pt left AMA d/t anxiety  Pt smoking prior to triage and c/o lung pain and SOB     History of Illness: 46 y o  male with 40 pack-year smoking history and recent history of alcohol dependence (quit 6 months prior) who presents with a few days of shortness of breath, cough productive of sputum, wheezing  The patient reports he was recently in the emergency department on 01/24 for similar symptoms and he left at that time as he did not want to get admitted  He returns for similar symptoms and worsening shortness of breath now at rest   He has been in and out of [facility] for the past 10 years and has not seen any doctor for medical care      He reports chills days prior but none currently  Does report multiple sick contacts at his rehab facility   At baseline, reports cough in AM with cigarette but no SOB at rest or with exertion      ED Vital Signs:   ED Triage Vitals [01/26/19 0808]   Temperature Pulse Respirations Blood Pressure SpO2   (!) 97 2 °F (36 2 °C) 78 21 142/91 96 %      Temp Source Heart Rate Source Patient Position - Orthostatic VS BP Location FiO2 (%)   Oral Monitor Sitting Left arm --      Pain Score 3        Wt Readings from Last 1 Encounters:   01/26/19 79 2 kg (174 lb 9 6 oz)     Vital Signs (abnormal): WNL  Pertinent Labs/Diagnostic Test Results: CBC, BMP -- WNL  CXR -- NO ACUTE ABNL  EK G-- ST RIGHT INCOMPLETE BBB    ED Treatment:   Medication Administration from 01/26/2019 0801 to 01/26/2019 1610       Date/Time Order Dose Route Action     01/26/2019 0902 albuterol inhalation solution 10 mg 10 mg Nebulization Given     01/26/2019 0902 ipratropium (ATROVENT) 0 02 % inhalation solution 1 mg 1 mg Nebulization Given     01/26/2019 0901 sodium chloride 0 9 % inhalation solution 3 mL 3 mL Nebulization Given     01/26/2019 0900 methylPREDNISolone sodium succinate (Solu-MEDROL) injection 125 mg 125 mg Intravenous Given     01/26/2019 1105 nicotine (NICODERM CQ) 14 mg/24hr TD 24 hr patch 1 patch 1 patch Transdermal Medication Applied     01/26/2019 1105 enoxaparin (LOVENOX) subcutaneous injection 40 mg 40 mg Subcutaneous Given     01/26/2019 1438 ipratropium (ATROVENT) 0 02 % inhalation solution 0 5 mg 0 5 mg Nebulization Given     01/26/2019 1106 ipratropium (ATROVENT) 0 02 % inhalation solution 0 5 mg 0 5 mg Nebulization Given     01/26/2019 1106 azithromycin (ZITHROMAX) tablet 500 mg 500 mg Oral Given     01/26/2019 1149 acetaminophen (TYLENOL) tablet 650 mg 650 mg Oral Given     01/26/2019 1438 levalbuterol (XOPENEX) inhalation solution 1 25 mg 1 25 mg Nebulization Given     Past Medical/Surgical History:    Active Ambulatory Problems     Diagnosis Date Noted    Nicotine dependence 01/24/2019    Chronic obstructive pulmonary disease with acute exacerbation ([facility] ) 01/24/2019    Acute respiratory failure with hypoxia ([facility] ) 01/24/2019    [facility] ([facility] ) 01/24/2019     No Additional Past Medical History     Admitting Diagnosis: Chest pain [R07 9]  Bronchitis [J40]  COPD with acute exacerbation (HCC) [J44 1]  Age/Sex: 46 y o  male     Assessment/Plan:    ) SOB: Likely 2/2 undiagnosed COPD with exacerbation     · Start Azithromycin, IV Solumedrol 40 mg TiD, Ipratropium, and albuterol PRN  · Respiratory protocol  · Monitor respiratory status      2 ) Nicotine dependence  · Nicoderm patch     3 ) Alcohol dependence: Last drink 6 months ago and currently lives in rehab facility       Admission Orders:  Scheduled Meds:   Current Facility-Administered Medications:  acetaminophen 650 mg Oral Q6H PRN   albuterol 2 5 mg Nebulization Q6H PRN   azithromycin 500 mg Oral Q24H   enoxaparin 40 mg Subcutaneous Daily   ipratropium 0 5 mg Nebulization TID   levalbuterol 1 25 mg Nebulization TID   methylPREDNISolone sodium succinate 40 mg Intravenous Q12H [facility]   nicotine 1 patch Transdermal Daily   ondansetron 4 mg Intravenous Q8H PRN     TELEM  RESPIRATORY PROTOCOL  REG DIET  I/O  UP AS ADRIAN  SCD'S

## 2019-01-27 NOTE — DISCHARGE INSTRUCTIONS
COPD Exacerbation, Ambulatory Care   GENERAL INFORMATION:   A COPD (chronic obstructive pulmonary disease ) exacerbation  is a flare up or worsening of COPD  Common symptoms include the following:   · Shortness of breath     · A dry cough     · Coughing fits that bring up mucus from your lungs     · Wheezing and chest tightness  Seek immediate care for the following symptoms:   · Confusion, dizziness, or lightheadedness    · Red, swollen, warm arm or leg    · Shortness of breath or chest pain    · Coughing up blood  Treatment for a COPD exacerbation  may include medicines to help decrease swelling and inflammation in your lungs  Medicines may also help open your airways or treat and infection  You may need pulmonary rehabilitation to help you manage your symptoms and improve your quality of life  You may need extra oxygen to help you breathe easier  Prevent another exacerbation:   · Do not smoke, and avoid others who smoke  If you smoke, it is never too late to quit  You may have fewer exacerbations  Ask for information about medicines and support programs that can help you quit  · Avoid triggers that make your symptoms worse  Cold weather and sudden temperature changes can trigger an exacerbation  Fumes from cars and chemicals, air pollution, and perfume can also increase your symptoms  · Use pursed-lip breathing when you feel short of breath  Take a deep breath in through your nose  Slowly breathe out through your mouth with your lips pursed for twice as long as you inhaled  You can also practice this breathing pattern while you bend, lift, climb stairs, or exercise  Pursed-lip breathing slows down your breathing and helps move more air in and out of your lungs  · Exercise for at least 20 minutes each day  Exercise can help increase your energy and decrease shortness of breath  Ask about the best exercise plan for you  · Prevent infections that can be dangerous when you have COPD    Get a flu vaccine every year as soon as it becomes available  Ask if you should also get other vaccines, such as those given to prevent pneumonia and tetanus  Avoid people who are sick, and wash your hands often  Follow up with your healthcare provider as directed:  Write down your questions so you remember to ask them during your visits  CARE AGREEMENT:   You have the right to help plan your care  Learn about your health condition and how it may be treated  Discuss treatment options with your caregivers to decide what care you want to receive  You always have the right to refuse treatment  The above information is an  only  It is not intended as medical advice for individual conditions or treatments  Talk to your doctor, nurse or pharmacist before following any medical regimen to see if it is safe and effective for you  © 2014 [address] is for End User's use only and may not be sold, redistributed or otherwise used for commercial purposes  All illustrations and images included in CareNotes® are the copyrighted property of [employer]  or [employer] Hui

## 2019-01-27 NOTE — PROGRESS NOTES
Residency Progress Note   Unit/Bed#: -01 Encounter: [id number]  SOD Team C       Kenny Marquez 46 y o  male [id number]    Hospital Stay Days: 0      Assessment/Plan:    Principal Problem:    Chronic obstructive pulmonary disease with acute exacerbation ([facility])  Active Problems:    Nicotine dependence    Alcohol dependence in remission ([facility] [address] )    COPD of unknown severity with exacerbation  -Continue Azithromycin and Prednisone   -Will need rescue inhaler on DC    Nicotine dependence   -Patch     Alcohol dependence   -Reports that he has been clean for 6 months      Disposition: DC home pending cost of medications        Subjective: Pt was seen and examined this morning  No complaints  Wants to go home       Vitals: Temp (24hrs), Av 1 °F (36 7 °C), Min:97 8 °F (36 6 °C), Max:98 4 °F (36 9 °C)  Current: Temperature: 98 4 °F (36 9 °C)  Vitals:    19 1546 19 1628 19 1955 19 0824   BP: 118/57 110/78  128/76   BP Location: Right arm Right arm  Left arm   Pulse: 89 96  93   Resp: 20 18  18   Temp:  97 8 °F (36 6 °C)  98 4 °F (36 9 °C)   TempSrc:  Oral  Oral   SpO2: 93% 92% 91% 90%   Weight:  79 2 kg (174 lb 9 6 oz)     Height:        Body mass index is 23 04 kg/m²  No intake/output data recorded  Physical Exam   Constitutional: He appears well-developed and well-nourished  No distress  HENT:   Head: Normocephalic and atraumatic  Eyes: Conjunctivae are normal  Right eye exhibits no discharge  Left eye exhibits no discharge  Neck: Neck supple  No JVD present  Cardiovascular: Normal rate and regular rhythm  No murmur heard  Pulmonary/Chest: No respiratory distress  He has no wheezes  Abdominal: Soft  He exhibits no distension  There is no tenderness  There is no rebound and no guarding  Musculoskeletal: Normal range of motion  He exhibits no edema  Neurological: He is alert  No cranial nerve deficit  Skin: Skin is warm and dry  No rash noted   He is not diaphoretic  No erythema  Invasive Devices     Peripheral Intravenous Line            Peripheral IV 01/26/19 Left Antecubital 1 day                          Labs:   Recent Results (from the past 24 hour(s))   Basic metabolic panel    Collection Time: 01/27/19  6:34 AM   Result Value Ref Range    Sodium 142 136 - 145 mmol/L    Potassium 4 4 3 5 - 5 3 mmol/L    Chloride 112 (H) 100 - 108 mmol/L    CO2 23 21 - 32 mmol/L    ANION GAP 7 4 - 13 mmol/L    BUN 12 5 - 25 mg/dL    Creatinine 0 74 0 60 - 1 30 mg/dL    Glucose 111 65 - 140 mg/dL    Glucose, Fasting 111 (H) 65 - 99 mg/dL    Calcium 8 6 8 3 - 10 1 mg/dL    eGFR 105 ml/min/1 73sq m   CBC and differential    Collection Time: 01/27/19  6:35 AM   Result Value Ref Range    WBC 9 38 4 31 - 10 16 Thousand/uL    RBC 4 52 3 88 - 5 62 Million/uL    Hemoglobin 14 5 12 0 - 17 0 g/dL    Hematocrit 46 1 36 5 - 49 3 %     (H) 82 - 98 fL    MCH 32 1 26 8 - 34 3 pg    MCHC 31 5 31 4 - 37 4 g/dL    RDW 12 6 11 6 - 15 1 %    MPV 9 5 8 9 - 12 7 fL    Platelets 972 438 - 307 Thousands/uL    nRBC 0 /100 WBCs    Neutrophils Relative 84 (H) 43 - 75 %    Immat GRANS % 1 0 - 2 %    Lymphocytes Relative 10 (L) 14 - 44 %    Monocytes Relative 5 4 - 12 %    Eosinophils Relative 0 0 - 6 %    Basophils Relative 0 0 - 1 %    Neutrophils Absolute 7 88 (H) 1 85 - 7 62 Thousands/µL    Immature Grans Absolute 0 05 0 00 - 0 20 Thousand/uL    Lymphocytes Absolute 0 95 0 60 - 4 47 Thousands/µL    Monocytes Absolute 0 49 0 17 - 1 22 Thousand/µL    Eosinophils Absolute 0 00 0 00 - 0 61 Thousand/µL    Basophils Absolute 0 01 0 00 - 0 10 Thousands/µL       Radiology Results: I have personally reviewed pertinent reports  Other Diagnostic Testing:   I have personally reviewed pertinent reports          Active Meds:   Current Facility-Administered Medications   Medication Dose Route Frequency    acetaminophen (TYLENOL) tablet 650 mg  650 mg Oral Q6H PRN    albuterol inhalation solution 2 5 mg  2 5 mg Nebulization Q6H PRN    azithromycin (ZITHROMAX) tablet 500 mg  500 mg Oral Q24H    enoxaparin (LOVENOX) subcutaneous injection 40 mg  40 mg Subcutaneous Daily    ipratropium (ATROVENT) 0 02 % inhalation solution 0 5 mg  0 5 mg Nebulization TID    levalbuterol (XOPENEX) inhalation solution 1 25 mg  1 25 mg Nebulization TID    methylPREDNISolone sodium succinate (Solu-MEDROL) injection 40 mg  40 mg Intravenous Q12H [facility]    nicotine (NICODERM CQ) 14 mg/24hr TD 24 hr patch 1 patch  1 patch Transdermal Daily    ondansetron (ZOFRAN) injection 4 mg  4 mg Intravenous Q8H PRN         VTE Pharmacologic Prophylaxis: Enoxaparin (Lovenox)  VTE Mechanical Prophylaxis: sequential compression device    Sosa Restrepo DO

## 2019-01-28 LAB
FLUAV AG SPEC QL: NORMAL
FLUBV AG SPEC QL: NORMAL
RSV B RNA SPEC QL NAA+PROBE: NORMAL

## 2019-08-30 ENCOUNTER — HOSPITAL ENCOUNTER (EMERGENCY)
Facility: HOSPITAL | Age: 54
Discharge: LEFT BEFORE DISCHARGE | End: 2019-08-30
Attending: EMERGENCY MEDICINE | Admitting: EMERGENCY MEDICINE
Payer: COMMERCIAL

## 2019-08-30 VITALS
SYSTOLIC BLOOD PRESSURE: 154 MMHG | DIASTOLIC BLOOD PRESSURE: 105 MMHG | OXYGEN SATURATION: 98 % | BODY MASS INDEX: 23.09 KG/M2 | WEIGHT: 175 LBS | RESPIRATION RATE: 18 BRPM | TEMPERATURE: 97.9 F | HEART RATE: 103 BPM

## 2019-08-30 DIAGNOSIS — F10.920 ALCOHOLIC INTOXICATION WITHOUT COMPLICATION (HCC): Primary | ICD-10-CM

## 2019-08-30 LAB
ALBUMIN SERPL BCP-MCNC: 3.7 G/DL (ref 3.5–5)
ALP SERPL-CCNC: 107 U/L (ref 46–116)
ALT SERPL W P-5'-P-CCNC: 56 U/L (ref 12–78)
AMPHETAMINES SERPL QL SCN: NEGATIVE
ANION GAP SERPL CALCULATED.3IONS-SCNC: 9 MMOL/L (ref 4–13)
AST SERPL W P-5'-P-CCNC: 68 U/L (ref 5–45)
BARBITURATES UR QL: NEGATIVE
BASOPHILS # BLD AUTO: 0.05 THOUSANDS/ΜL (ref 0–0.1)
BASOPHILS NFR BLD AUTO: 1 % (ref 0–1)
BENZODIAZ UR QL: NEGATIVE
BILIRUB SERPL-MCNC: 0.46 MG/DL (ref 0.2–1)
BUN SERPL-MCNC: 6 MG/DL (ref 5–25)
CALCIUM SERPL-MCNC: 8.1 MG/DL (ref 8.3–10.1)
CHLORIDE SERPL-SCNC: 108 MMOL/L (ref 100–108)
CO2 SERPL-SCNC: 22 MMOL/L (ref 21–32)
COCAINE UR QL: NEGATIVE
CREAT SERPL-MCNC: 0.78 MG/DL (ref 0.6–1.3)
EOSINOPHIL # BLD AUTO: 0.04 THOUSAND/ΜL (ref 0–0.61)
EOSINOPHIL NFR BLD AUTO: 1 % (ref 0–6)
ERYTHROCYTE [DISTWIDTH] IN BLOOD BY AUTOMATED COUNT: 14.9 % (ref 11.6–15.1)
GFR SERPL CREATININE-BSD FRML MDRD: 102 ML/MIN/1.73SQ M
GLUCOSE SERPL-MCNC: 86 MG/DL (ref 65–140)
HCT VFR BLD AUTO: 43.1 % (ref 36.5–49.3)
HGB BLD-MCNC: 14.9 G/DL (ref 12–17)
IMM GRANULOCYTES # BLD AUTO: 0.03 THOUSAND/UL (ref 0–0.2)
IMM GRANULOCYTES NFR BLD AUTO: 0 % (ref 0–2)
LIPASE SERPL-CCNC: 276 U/L (ref 73–393)
LYMPHOCYTES # BLD AUTO: 2.13 THOUSANDS/ΜL (ref 0.6–4.47)
LYMPHOCYTES NFR BLD AUTO: 24 % (ref 14–44)
MAGNESIUM SERPL-MCNC: 2 MG/DL (ref 1.6–2.6)
MCH RBC QN AUTO: 33.4 PG (ref 26.8–34.3)
MCHC RBC AUTO-ENTMCNC: 34.6 G/DL (ref 31.4–37.4)
MCV RBC AUTO: 97 FL (ref 82–98)
METHADONE UR QL: NEGATIVE
MONOCYTES # BLD AUTO: 0.56 THOUSAND/ΜL (ref 0.17–1.22)
MONOCYTES NFR BLD AUTO: 6 % (ref 4–12)
NEUTROPHILS # BLD AUTO: 6.06 THOUSANDS/ΜL (ref 1.85–7.62)
NEUTS SEG NFR BLD AUTO: 68 % (ref 43–75)
NRBC BLD AUTO-RTO: 0 /100 WBCS
OPIATES UR QL SCN: NEGATIVE
PCP UR QL: NEGATIVE
PLATELET # BLD AUTO: 199 THOUSANDS/UL (ref 149–390)
PMV BLD AUTO: 9.3 FL (ref 8.9–12.7)
POTASSIUM SERPL-SCNC: 4.1 MMOL/L (ref 3.5–5.3)
PROT SERPL-MCNC: 7 G/DL (ref 6.4–8.2)
RBC # BLD AUTO: 4.46 MILLION/UL (ref 3.88–5.62)
SODIUM SERPL-SCNC: 139 MMOL/L (ref 136–145)
THC UR QL: NEGATIVE
WBC # BLD AUTO: 8.87 THOUSAND/UL (ref 4.31–10.16)

## 2019-08-30 PROCEDURE — 99284 EMERGENCY DEPT VISIT MOD MDM: CPT | Performed by: EMERGENCY MEDICINE

## 2019-08-30 PROCEDURE — 85025 COMPLETE CBC W/AUTO DIFF WBC: CPT | Performed by: EMERGENCY MEDICINE

## 2019-08-30 PROCEDURE — 80307 DRUG TEST PRSMV CHEM ANLYZR: CPT | Performed by: EMERGENCY MEDICINE

## 2019-08-30 PROCEDURE — 83690 ASSAY OF LIPASE: CPT | Performed by: EMERGENCY MEDICINE

## 2019-08-30 PROCEDURE — 80053 COMPREHEN METABOLIC PANEL: CPT | Performed by: EMERGENCY MEDICINE

## 2019-08-30 PROCEDURE — 83735 ASSAY OF MAGNESIUM: CPT | Performed by: EMERGENCY MEDICINE

## 2019-08-30 PROCEDURE — 99285 EMERGENCY DEPT VISIT HI MDM: CPT

## 2019-08-30 PROCEDURE — 36415 COLL VENOUS BLD VENIPUNCTURE: CPT | Performed by: EMERGENCY MEDICINE

## 2019-08-30 RX ORDER — ONDANSETRON 4 MG/1
4 TABLET, ORALLY DISINTEGRATING ORAL ONCE
Status: COMPLETED | OUTPATIENT
Start: 2019-08-30 | End: 2019-08-30

## 2019-08-30 RX ADMIN — ONDANSETRON 4 MG: 4 TABLET, ORALLY DISINTEGRATING ORAL at 10:41

## 2019-08-30 NOTE — ED PROVIDER NOTES
History  Chief Complaint   Patient presents with    Withdrawal - Alcohol     pt found at [facility] in [place], reports drinking cooking wine a couple beers this morning  c/o shakiness and abdominal "burning"     58-year-old male history of chronic alcohol abuse discharged from rehab 3 months ago presents to the ED for evaluation of alcohol intoxication  Patient was found at [facility] acutely intoxicated complaining of shakiness and burning abdominal pain  Patient is brought to the ED for evaluation via EMS  Patient states that he is a chronic daily drinker and admits to drinking an entire bottle of cooking wine this morning as well as a few beers  Patient also reports 1 day history diffuse, burning abdominal pain which began as he was drinking cooking wine  He denies any alleviating factors states the pain is constant, and worse with drinking cooking wine  The pain is associated with nausea, but no episodes of vomiting or diarrhea  Patient states that he fell on his left side yesterday, but denies any head strike loss of consciousness  He denies any SI, HI, auditory, or visual hallucinations  He denies any fevers, chills, neck stiffness, chest pain, or shortness of breath  He denies any seizure-like activity  He states he has chronic headaches and chronic neck pain  He has been hospitalized in the past for alcohol withdrawal      He is interested in rehab  Prior to Admission Medications   Prescriptions Last Dose Informant Patient Reported? Taking?   acetaminophen (TYLENOL) 325 mg tablet   Yes No   Sig: Take 650 mg by mouth every 6 (six) hours as needed for mild pain or headaches   albuterol (VENTOLIN HFA) 90 mcg/act inhaler   No No   Sig: Inhale 2 puffs every 6 (six) hours as needed for wheezing      Facility-Administered Medications: None       Past Medical History:   Diagnosis Date    COPD (chronic obstructive pulmonary disease) ([facility] [address] )        History reviewed   No pertinent surgical history  History reviewed  No pertinent family history  I have reviewed and agree with the history as documented  Social History     Tobacco Use    Smoking status: Current Every Day Smoker     Packs/day: 1 00     Years: 40 00     Pack years: 40 00     Types: Cigarettes    Smokeless tobacco: Never Used   Substance Use Topics    Alcohol use: Yes     Comment: "recovery for 5 months"    Drug use: No        Review of Systems   Constitutional: Negative for activity change, appetite change, chills, fatigue and fever  HENT: Negative for drooling, ear discharge, ear pain, postnasal drip, rhinorrhea, sinus pressure, sinus pain, sneezing and sore throat  Eyes: Negative for photophobia, pain, discharge, redness, itching and visual disturbance  Respiratory: Negative for cough, choking, chest tightness, shortness of breath and wheezing  Cardiovascular: Negative for chest pain, palpitations and leg swelling  Gastrointestinal: Positive for abdominal pain and nausea  Negative for blood in stool, diarrhea and vomiting  Genitourinary: Negative for dysuria, flank pain, genital sores and hematuria  Musculoskeletal: Positive for neck pain  Negative for arthralgias, back pain and myalgias  Skin: Negative for color change and rash  Neurological: Positive for headaches  Negative for dizziness, tremors, seizures, syncope, light-headedness and numbness  Hematological: Negative  Psychiatric/Behavioral: Negative for hallucinations, self-injury and suicidal ideas         Physical Exam  ED Triage Vitals   Temperature Pulse Respirations Blood Pressure SpO2   08/30/19 0958 08/30/19 0958 08/30/19 0958 08/30/19 0958 08/30/19 0958   97 9 °F (36 6 °C) 100 18 151/83 96 %      Temp Source Heart Rate Source Patient Position - Orthostatic VS BP Location FiO2 (%)   08/30/19 0958 08/30/19 1055 08/30/19 1055 08/30/19 1055 --   Tympanic Monitor Sitting Left arm       Pain Score       08/30/19 0958       5 Orthostatic Vital Signs  Vitals:    08/30/19 0958 08/30/19 1055   BP: 151/83 (!) 154/105   Pulse: 100 103   Patient Position - Orthostatic VS:  Sitting       Physical Exam   Constitutional: He is oriented to person, place, and time  He appears well-developed and well-nourished  Patient intoxicated   HENT:   Head: Normocephalic and atraumatic  Right Ear: External ear normal    Left Ear: External ear normal    Nose: Nose normal    Mouth/Throat: Oropharynx is clear and moist    Eyes: Pupils are equal, round, and reactive to light  EOM are normal  Right conjunctiva is injected  Left conjunctiva is injected  Pupils dilated   Cardiovascular: Regular rhythm, normal heart sounds and intact distal pulses  Tachycardia present  No murmur heard  Pulmonary/Chest: Effort normal and breath sounds normal  No respiratory distress  He has no wheezes  Abdominal: Soft  Bowel sounds are normal  He exhibits no distension  There is generalized tenderness  There is no rigidity, no rebound, no guarding, no CVA tenderness, no tenderness at McBurney's point and negative Aburto's sign  Musculoskeletal: Normal range of motion  He exhibits no edema, tenderness or deformity  Neurological: He is alert and oriented to person, place, and time  He displays normal reflexes  No cranial nerve deficit or sensory deficit  Skin: Skin is warm and dry  Capillary refill takes less than 2 seconds  Superficial abrasions to the left elbow, lateral left knee   Nursing note and vitals reviewed        ED Medications  Medications   ondansetron (ZOFRAN-ODT) dispersible tablet 4 mg (4 mg Oral Given 8/30/19 1041)       Diagnostic Studies  Results Reviewed     Procedure Component Value Units Date/Time    Rapid drug screen, urine [[id number]]  (Normal) Collected:  08/30/19 1046    Lab Status:  Final result Specimen:  Urine, Clean Catch Updated:  08/30/19 1125     Amph/Meth UR Negative     Barbiturate Ur Negative     Benzodiazepine Urine Negative Cocaine Urine Negative     Methadone Urine Negative     Opiate Urine Negative     PCP Ur Negative     THC Urine Negative    Narrative:       FOR MEDICAL PURPOSES ONLY  IF CONFIRMATION NEEDED PLEASE CONTACT THE LAB WITHIN 5 DAYS      Drug Screen Cutoff Levels:  AMPHETAMINE/METHAMPHETAMINES  1000 ng/mL  BARBITURATES     200 ng/mL  BENZODIAZEPINES     200 ng/mL  COCAINE      300 ng/mL  METHADONE      300 ng/mL  OPIATES      300 ng/mL  PHENCYCLIDINE     25 ng/mL  THC       50 ng/mL      Lipase [[id number]]  (Normal) Collected:  08/30/19 1041    Lab Status:  Final result Specimen:  Blood from Arm, Right Updated:  08/30/19 1114     Lipase 276 u/L     Comprehensive metabolic panel [[id number]]  (Abnormal) Collected:  08/30/19 1041    Lab Status:  Final result Specimen:  Blood from Arm, Right Updated:  08/30/19 1114     Sodium 139 mmol/L      Potassium 4 1 mmol/L      Chloride 108 mmol/L      CO2 22 mmol/L      ANION GAP 9 mmol/L      BUN 6 mg/dL      Creatinine 0 78 mg/dL      Glucose 86 mg/dL      Calcium 8 1 mg/dL      AST 68 U/L      ALT 56 U/L      Alkaline Phosphatase 107 U/L      Total Protein 7 0 g/dL      Albumin 3 7 g/dL      Total Bilirubin 0 46 mg/dL      eGFR 102 ml/min/1 73sq m     Narrative:       [place] guidelines for Chronic Kidney Disease (CKD):     Stage 1 with normal or high GFR (GFR > 90 mL/min/1 73 square meters)    Stage 2 Mild CKD (GFR = 60-89 mL/min/1 73 square meters)    Stage 3A Moderate CKD (GFR = 45-59 mL/min/1 73 square meters)    Stage 3B Moderate CKD (GFR = 30-44 mL/min/1 73 square meters)    Stage 4 Severe CKD (GFR = 15-29 mL/min/1 73 square meters)    Stage 5 End Stage CKD (GFR <15 mL/min/1 73 square meters)  Note: GFR calculation is accurate only with a steady state creatinine    Magnesium [[id number]]  (Normal) Collected:  08/30/19 1041    Lab Status:  Final result Specimen:  Blood from Arm, Right Updated:  08/30/19 1114     Magnesium 2 0 mg/dL     CBC and differential [[id number]] Collected:  08/30/19 1041    Lab Status:  Final result Specimen:  Blood from Arm, Right Updated:  08/30/19 1055     WBC 8 87 Thousand/uL      RBC 4 46 Million/uL      Hemoglobin 14 9 g/dL      Hematocrit 43 1 %      MCV 97 fL      MCH 33 4 pg      MCHC 34 6 g/dL      RDW 14 9 %      MPV 9 3 fL      Platelets 332 Thousands/uL      nRBC 0 /100 WBCs      Neutrophils Relative 68 %      Immat GRANS % 0 %      Lymphocytes Relative 24 %      Monocytes Relative 6 %      Eosinophils Relative 1 %      Basophils Relative 1 %      Neutrophils Absolute 6 06 Thousands/µL      Immature Grans Absolute 0 03 Thousand/uL      Lymphocytes Absolute 2 13 Thousands/µL      Monocytes Absolute 0 56 Thousand/µL      Eosinophils Absolute 0 04 Thousand/µL      Basophils Absolute 0 05 Thousands/µL                  No orders to display         Procedures  Procedures        ED Course  ED Course as of Aug 31 0919   Fri Aug 30, 2019   1012 Blood Pressure: 151/83   1012 Temperature: 97 9 °F (36 6 °C)   1012 Pulse: 100   1012 Respirations: 18   1012 SpO2: 96 %                               MDM  Number of Diagnoses or Management Options  Alcoholic intoxication without complication [facility]):   Diagnosis management comments: 58-year-old male presents the ED for evaluation of alcohol intoxication with generalized burning abdominal pain  Will check CBC, CMP, magnesium, lipase, UDS, BAT  Will look for hepatitis versus pancreatitis, if labs grossly unremarkable will consult crisis and host for possible rehab  Patient's labs grossly unremarkable  Patient eloped from the emergency department before crisis and host evaluation         Disposition  Final diagnoses:   Alcoholic intoxication without complication ([address] )     Time reflects when diagnosis was documented in both MDM as applicable and the Disposition within this note     Time User Action Codes Description Comment    8/30/2019 11:46 AM Damon Guadarrama Add [O99 [id number]] Alcoholic intoxication without complication [facility])       ED Disposition     ED Disposition Condition Date/Time Comment    Eloped  Fri Aug 30, 2019 11:46 AM       Follow-up Information    None         Discharge Medication List as of 8/30/2019 11:45 AM      CONTINUE these medications which have NOT CHANGED    Details   acetaminophen (TYLENOL) 325 mg tablet Take 650 mg by mouth every 6 (six) hours as needed for mild pain or headaches, Historical Med      albuterol (VENTOLIN HFA) 90 mcg/act inhaler Inhale 2 puffs every 6 (six) hours as needed for wheezing, Starting Sun 1/27/2019, Print           No discharge procedures on file  ED Provider  Attending physically available and evaluated Lord Lona DONATO managed the patient along with the ED Attending      Electronically Signed by         Conchita Louise MD  08/31/19 7023

## 2019-08-30 NOTE — ED NOTES
Pt unable to be found in room, hallway, or waiting room  Dr Ariel Patel aware of pt elopement       Chikis Alexis, BRYAN  08/30/19 4593

## 2019-08-30 NOTE — ED ATTENDING ATTESTATION
I,Triston Tong MD, saw and evaluated the patient  I have discussed the patient with the resident/non-physician practitioner and agree with the resident's/non-physician practitioner's findings, Plan of Care, and MDM as documented in the resident's/non-physician practitioner's note, except where noted  All available labs and Radiology studies were reviewed  I was present for key portions of any procedure(s) performed by the resident/non-physician practitioner and I was immediately available to provide assistance  At this point I agree with the current assessment done in the Emergency Department  I have conducted an independent evaluation of this patient including a focused history and a physical exam       53-year-old male, history of alcoholism, last rehab approximately 3 months ago, presenting to the emergency department for alcohol intoxication  Patient was [place] [employer] drinking cooking wine  He reports that he developed generalized burning abdominal discomfort while he was drinking the cooking wine  No alleviating factors  Nonradiating  Patient reports that he has had several episodes a recent vomiting without blood  Patient denies any black or bloody bowel movements  Patient has a chronic cough from smoking  Patient is agreeable to assistance with rehab  No suicidal or homicidal ideation  Ten systems reviewed negative except as noted  The patient is resting comfortably on a stretcher in no acute respiratory distress  The patient appears nontoxic  HEENT reveals moist mucous membranes  Head is normocephalic and atraumatic  Conjunctiva and sclera are normal  Neck is nontender and supple with full range of motion to flexion, extension, lateral rotation  No meningismus appreciated  No masses are appreciated  Lungs are clear to auscultation bilaterally without any wheezes, rales or rhonchi  Heart is regular rate and rhythm without any murmurs, rubs or gallops   Abdomen is soft and nontender without any rebound or guarding  Extremities appear grossly normal without any significant arthropathy  Patient is awake, alert, and oriented x3  The patient has normal interaction  GCS 15  Motor is 5 out of 5 bilateral upper lower extremities  Patient ambulates with a steady gait  Assessment and plan:  Patient presenting with generalized abdominal discomfort  Evaluation for alcoholic hepatitis and pancreatitis with subsequent referral for rehab  Labs Reviewed   COMPREHENSIVE METABOLIC PANEL - Abnormal       Result Value Ref Range Status    Sodium 139  136 - 145 mmol/L Final    Potassium 4 1  3 5 - 5 3 mmol/L Final    Comment: Slightly Hemolyzed; Results May be Affected    Chloride 108  100 - 108 mmol/L Final    CO2 22  21 - 32 mmol/L Final    ANION GAP 9  4 - 13 mmol/L Final    BUN 6  5 - 25 mg/dL Final    Creatinine 0 78  0 60 - 1 30 mg/dL Final    Comment: Standardized to IDMS reference method    Glucose 86  65 - 140 mg/dL Final    Comment:   If the patient is fasting, the ADA then defines impaired fasting glucose as > 100 mg/dL and diabetes as > or equal to 123 mg/dL  Specimen collection should occur prior to Sulfasalazine administration due to the potential for falsely depressed results  Specimen collection should occur prior to Sulfapyridine administration due to the potential for falsely elevated results  Calcium 8 1 (*) 8 3 - 10 1 mg/dL Final    AST 68 (*) 5 - 45 U/L Final    Comment: Slightly Hemolyzed; Results May be Affected  Specimen collection should occur prior to Sulfasalazine administration due to the potential for falsely depressed results  ALT 56  12 - 78 U/L Final    Comment:   Specimen collection should occur prior to Sulfasalazine and/or Sulfapyridine administration due to the potential for falsely depressed results       Alkaline Phosphatase 107  46 - 116 U/L Final    Total Protein 7 0  6 4 - 8 2 g/dL Final    Albumin 3 7  3 5 - 5 0 g/dL Final    Total Bilirubin 0 46 0 20 - 1 00 mg/dL Final    eGFR 102  ml/min/1 73sq m Final    Narrative:     [place] guidelines for Chronic Kidney Disease (CKD):     Stage 1 with normal or high GFR (GFR > 90 mL/min/1 73 square meters)    Stage 2 Mild CKD (GFR = 60-89 mL/min/1 73 square meters)    Stage 3A Moderate CKD (GFR = 45-59 mL/min/1 73 square meters)    Stage 3B Moderate CKD (GFR = 30-44 mL/min/1 73 square meters)    Stage 4 Severe CKD (GFR = 15-29 mL/min/1 73 square meters)    Stage 5 End Stage CKD (GFR <15 mL/min/1 73 square meters)  Note: GFR calculation is accurate only with a steady state creatinine   LIPASE - Normal    Lipase 276  73 - 393 u/L Final   MAGNESIUM - Normal    Magnesium 2 0  1 6 - 2 6 mg/dL Final    Comment: Slightly Hemolyzed; Results May be Affected   RAPID DRUG SCREEN, URINE - Normal    Amph/Meth UR Negative  Negative Final    Barbiturate Ur Negative  Negative Final    Benzodiazepine Urine Negative  Negative Final    Cocaine Urine Negative  Negative Final    Methadone Urine Negative  Negative Final    Opiate Urine Negative  Negative Final    PCP Ur Negative  Negative Final    THC Urine Negative  Negative Final    Narrative:     FOR MEDICAL PURPOSES ONLY  IF CONFIRMATION NEEDED PLEASE CONTACT THE LAB WITHIN 5 DAYS      Drug Screen Cutoff Levels:  AMPHETAMINE/METHAMPHETAMINES  1000 ng/mL  BARBITURATES     200 ng/mL  BENZODIAZEPINES     200 ng/mL  COCAINE      300 ng/mL  METHADONE      300 ng/mL  OPIATES      300 ng/mL  PHENCYCLIDINE     25 ng/mL  THC       50 ng/mL     CBC AND DIFFERENTIAL    WBC 8 87  4 31 - 10 16 Thousand/uL Final    RBC 4 46  3 88 - 5 62 Million/uL Final    Hemoglobin 14 9  12 0 - 17 0 g/dL Final    Hematocrit 43 1  36 5 - 49 3 % Final    MCV 97  82 - 98 fL Final    MCH 33 4  26 8 - 34 3 pg Final    MCHC 34 6  31 4 - 37 4 g/dL Final    RDW 14 9  11 6 - 15 1 % Final    MPV 9 3  8 9 - 12 7 fL Final    Platelets 057  889 - 390 Thousands/uL Final    nRBC 0  /100 WBCs Final    Neutrophils Relative 68  43 - 75 % Final    Immat GRANS % 0  0 - 2 % Final    Lymphocytes Relative 24  14 - 44 % Final    Monocytes Relative 6  4 - 12 % Final    Eosinophils Relative 1  0 - 6 % Final    Basophils Relative 1  0 - 1 % Final    Neutrophils Absolute 6 06  1 85 - 7 62 Thousands/µL Final    Immature Grans Absolute 0 03  0 00 - 0 20 Thousand/uL Final    Lymphocytes Absolute 2 13  0 60 - 4 47 Thousands/µL Final    Monocytes Absolute 0 56  0 17 - 1 22 Thousand/µL Final    Eosinophils Absolute 0 04  0 00 - 0 61 Thousand/µL Final    Basophils Absolute 0 05  0 00 - 0 10 Thousands/µL Final   POCT ALCOHOL BREATH TEST       No orders to display     Patient eloped from the emergency department prior to being assessed by crisis for rehab

## 2019-09-03 DIAGNOSIS — J44.1 COPD WITH ACUTE EXACERBATION (HCC): ICD-10-CM

## 2019-09-05 RX ORDER — ALBUTEROL SULFATE 90 UG/1
AEROSOL, METERED RESPIRATORY (INHALATION)
Qty: 8.5 INHALER | Refills: 2 | OUTPATIENT
Start: 2019-09-05

## 2019-10-12 ENCOUNTER — HOSPITAL ENCOUNTER (OUTPATIENT)
Facility: HOSPITAL | Age: 54
Setting detail: OBSERVATION
Discharge: LEFT BEFORE DISCHARGE | End: 2019-10-13
Attending: EMERGENCY MEDICINE | Admitting: INTERNAL MEDICINE
Payer: COMMERCIAL

## 2019-10-12 DIAGNOSIS — F10.230 ALCOHOL WITHDRAWAL SYNDROME WITHOUT COMPLICATION (HCC): Primary | ICD-10-CM

## 2019-10-12 PROBLEM — F10.239 ALCOHOL WITHDRAWAL (HCC): Status: ACTIVE | Noted: 2019-10-12

## 2019-10-12 LAB
ALBUMIN SERPL BCP-MCNC: 4.1 G/DL (ref 3.5–5)
ALP SERPL-CCNC: 91 U/L (ref 46–116)
ALT SERPL W P-5'-P-CCNC: 50 U/L (ref 12–78)
AMPHETAMINES SERPL QL SCN: NEGATIVE
ANION GAP SERPL CALCULATED.3IONS-SCNC: 12 MMOL/L (ref 4–13)
AST SERPL W P-5'-P-CCNC: 123 U/L (ref 5–45)
BARBITURATES UR QL: NEGATIVE
BASOPHILS # BLD AUTO: 0.07 THOUSANDS/ΜL (ref 0–0.1)
BASOPHILS NFR BLD AUTO: 1 % (ref 0–1)
BENZODIAZ UR QL: NEGATIVE
BILIRUB SERPL-MCNC: 1.25 MG/DL (ref 0.2–1)
BUN SERPL-MCNC: 10 MG/DL (ref 5–25)
CALCIUM SERPL-MCNC: 9 MG/DL (ref 8.3–10.1)
CHLORIDE SERPL-SCNC: 107 MMOL/L (ref 100–108)
CO2 SERPL-SCNC: 24 MMOL/L (ref 21–32)
COCAINE UR QL: NEGATIVE
CREAT SERPL-MCNC: 0.67 MG/DL (ref 0.6–1.3)
EOSINOPHIL # BLD AUTO: 0.05 THOUSAND/ΜL (ref 0–0.61)
EOSINOPHIL NFR BLD AUTO: 1 % (ref 0–6)
ERYTHROCYTE [DISTWIDTH] IN BLOOD BY AUTOMATED COUNT: 15.2 % (ref 11.6–15.1)
ETHANOL EXG-MCNC: 0.03 MG/DL
ETHANOL SERPL-MCNC: 18 MG/DL (ref 0–3)
GFR SERPL CREATININE-BSD FRML MDRD: 109 ML/MIN/1.73SQ M
GLUCOSE SERPL-MCNC: 133 MG/DL (ref 65–140)
HCT VFR BLD AUTO: 41.6 % (ref 36.5–49.3)
HGB BLD-MCNC: 13.9 G/DL (ref 12–17)
IMM GRANULOCYTES # BLD AUTO: 0.03 THOUSAND/UL (ref 0–0.2)
IMM GRANULOCYTES NFR BLD AUTO: 0 % (ref 0–2)
LYMPHOCYTES # BLD AUTO: 1.57 THOUSANDS/ΜL (ref 0.6–4.47)
LYMPHOCYTES NFR BLD AUTO: 18 % (ref 14–44)
MCH RBC QN AUTO: 33.1 PG (ref 26.8–34.3)
MCHC RBC AUTO-ENTMCNC: 33.4 G/DL (ref 31.4–37.4)
MCV RBC AUTO: 99 FL (ref 82–98)
METHADONE UR QL: NEGATIVE
MONOCYTES # BLD AUTO: 0.71 THOUSAND/ΜL (ref 0.17–1.22)
MONOCYTES NFR BLD AUTO: 8 % (ref 4–12)
NEUTROPHILS # BLD AUTO: 6.4 THOUSANDS/ΜL (ref 1.85–7.62)
NEUTS SEG NFR BLD AUTO: 72 % (ref 43–75)
NRBC BLD AUTO-RTO: 0 /100 WBCS
OPIATES UR QL SCN: NEGATIVE
PCP UR QL: NEGATIVE
PLATELET # BLD AUTO: 310 THOUSANDS/UL (ref 149–390)
PMV BLD AUTO: 8.7 FL (ref 8.9–12.7)
POTASSIUM SERPL-SCNC: 3.1 MMOL/L (ref 3.5–5.3)
PROT SERPL-MCNC: 7 G/DL (ref 6.4–8.2)
RBC # BLD AUTO: 4.2 MILLION/UL (ref 3.88–5.62)
SODIUM SERPL-SCNC: 143 MMOL/L (ref 136–145)
THC UR QL: NEGATIVE
WBC # BLD AUTO: 8.83 THOUSAND/UL (ref 4.31–10.16)

## 2019-10-12 PROCEDURE — 85025 COMPLETE CBC W/AUTO DIFF WBC: CPT | Performed by: EMERGENCY MEDICINE

## 2019-10-12 PROCEDURE — 80307 DRUG TEST PRSMV CHEM ANLYZR: CPT | Performed by: EMERGENCY MEDICINE

## 2019-10-12 PROCEDURE — 99285 EMERGENCY DEPT VISIT HI MDM: CPT

## 2019-10-12 PROCEDURE — 96375 TX/PRO/DX INJ NEW DRUG ADDON: CPT

## 2019-10-12 PROCEDURE — 36415 COLL VENOUS BLD VENIPUNCTURE: CPT | Performed by: EMERGENCY MEDICINE

## 2019-10-12 PROCEDURE — 80320 DRUG SCREEN QUANTALCOHOLS: CPT | Performed by: EMERGENCY MEDICINE

## 2019-10-12 PROCEDURE — 96365 THER/PROPH/DIAG IV INF INIT: CPT

## 2019-10-12 PROCEDURE — 96366 THER/PROPH/DIAG IV INF ADDON: CPT

## 2019-10-12 PROCEDURE — 82075 ASSAY OF BREATH ETHANOL: CPT | Performed by: EMERGENCY MEDICINE

## 2019-10-12 PROCEDURE — 96367 TX/PROPH/DG ADDL SEQ IV INF: CPT

## 2019-10-12 PROCEDURE — 99285 EMERGENCY DEPT VISIT HI MDM: CPT | Performed by: EMERGENCY MEDICINE

## 2019-10-12 PROCEDURE — 80053 COMPREHEN METABOLIC PANEL: CPT | Performed by: EMERGENCY MEDICINE

## 2019-10-12 RX ORDER — POTASSIUM CHLORIDE 14.9 MG/ML
20 INJECTION INTRAVENOUS ONCE
Status: COMPLETED | OUTPATIENT
Start: 2019-10-12 | End: 2019-10-12

## 2019-10-12 RX ORDER — DIAZEPAM 5 MG/ML
5 INJECTION, SOLUTION INTRAMUSCULAR; INTRAVENOUS ONCE
Status: COMPLETED | OUTPATIENT
Start: 2019-10-12 | End: 2019-10-12

## 2019-10-12 RX ORDER — FOLIC ACID 1 MG/1
1 TABLET ORAL DAILY
Status: DISCONTINUED | OUTPATIENT
Start: 2019-10-13 | End: 2019-10-13 | Stop reason: HOSPADM

## 2019-10-12 RX ORDER — ACETAMINOPHEN 325 MG/1
650 TABLET ORAL EVERY 6 HOURS PRN
Status: DISCONTINUED | OUTPATIENT
Start: 2019-10-12 | End: 2019-10-13 | Stop reason: HOSPADM

## 2019-10-12 RX ORDER — NICOTINE 21 MG/24HR
1 PATCH, TRANSDERMAL 24 HOURS TRANSDERMAL DAILY
Status: DISCONTINUED | OUTPATIENT
Start: 2019-10-13 | End: 2019-10-13 | Stop reason: HOSPADM

## 2019-10-12 RX ORDER — ONDANSETRON 4 MG/1
4 TABLET, ORALLY DISINTEGRATING ORAL ONCE
Status: COMPLETED | OUTPATIENT
Start: 2019-10-12 | End: 2019-10-12

## 2019-10-12 RX ORDER — THIAMINE MONONITRATE (VIT B1) 100 MG
100 TABLET ORAL ONCE
Status: COMPLETED | OUTPATIENT
Start: 2019-10-12 | End: 2019-10-12

## 2019-10-12 RX ORDER — POTASSIUM CHLORIDE 20 MEQ/1
40 TABLET, EXTENDED RELEASE ORAL ONCE
Status: COMPLETED | OUTPATIENT
Start: 2019-10-12 | End: 2019-10-12

## 2019-10-12 RX ORDER — MAGNESIUM SULFATE 1 G/100ML
1 INJECTION INTRAVENOUS ONCE
Status: COMPLETED | OUTPATIENT
Start: 2019-10-12 | End: 2019-10-12

## 2019-10-12 RX ORDER — FOLIC ACID 1 MG/1
1 TABLET ORAL ONCE
Status: COMPLETED | OUTPATIENT
Start: 2019-10-12 | End: 2019-10-12

## 2019-10-12 RX ORDER — FAMOTIDINE 20 MG/1
20 TABLET, FILM COATED ORAL ONCE
Status: COMPLETED | OUTPATIENT
Start: 2019-10-12 | End: 2019-10-12

## 2019-10-12 RX ORDER — ALBUTEROL SULFATE 90 UG/1
2 AEROSOL, METERED RESPIRATORY (INHALATION) EVERY 6 HOURS PRN
Status: DISCONTINUED | OUTPATIENT
Start: 2019-10-12 | End: 2019-10-13 | Stop reason: HOSPADM

## 2019-10-12 RX ADMIN — DIAZEPAM 5 MG: 10 INJECTION, SOLUTION INTRAMUSCULAR; INTRAVENOUS at 14:20

## 2019-10-12 RX ADMIN — POTASSIUM CHLORIDE 40 MEQ: 1500 TABLET, EXTENDED RELEASE ORAL at 20:27

## 2019-10-12 RX ADMIN — THIAMINE HCL TAB 100 MG 100 MG: 100 TAB at 14:21

## 2019-10-12 RX ADMIN — ONDANSETRON 4 MG: 4 TABLET, ORALLY DISINTEGRATING ORAL at 16:13

## 2019-10-12 RX ADMIN — FAMOTIDINE 20 MG: 20 TABLET ORAL at 16:13

## 2019-10-12 RX ADMIN — MAGNESIUM SULFATE HEPTAHYDRATE 1 G: 1 INJECTION, SOLUTION INTRAVENOUS at 17:20

## 2019-10-12 RX ADMIN — POTASSIUM CHLORIDE 20 MEQ: 14.9 INJECTION, SOLUTION INTRAVENOUS at 15:12

## 2019-10-12 RX ADMIN — FOLIC ACID 1 MG: 1 TABLET ORAL at 14:21

## 2019-10-12 NOTE — ED NOTES
Phone call to [facility] program at [phone] as patient is requesting detox  Denies suicidal or homicidal ideation  Spoke with Sean Vann from [facility] and she advised someone would be here shortly to assess patient

## 2019-10-12 NOTE — ED NOTES
Brother in law at bedside     Francheska Song, [address] [facility]  10/12/19 5125

## 2019-10-12 NOTE — ED NOTES
Phone call received from [facility] from HOST  She called to advise that she saw Khai and he meets criteria for withdrawal management / detox  HOST will conduct bed search and call back if and when bed is located

## 2019-10-12 NOTE — ED PROVIDER NOTES
History  Chief Complaint   Patient presents with    Withdrawal - Alcohol     Pt reports wanting detox  Pt's last drink was this morning  Pt go through withdrawal, pt reports shaking "pretty bad  "Pt reports going to see HOST representative, Michelle Salgado, who knows he is here  Renan Bennett is an 46y o  year old male with PMHx significant for alcohol abuse, who presents to the ED today with alcohol withdrawal symptoms for a few hours  The patient is unable to quantify exactly how much he drinks but says he drinks as much as he can    He drinks a lot of cooking alcohol because he cannot afford other sources of alcohol at this time  He never goes more than a few hours without a drink in often have to wake up in the middle of the night for drink  He says he would like help  Currently he is nauseous, tremulous, and has palpitations  His last drink was at 9:00 a m  He denies that he has ever had a seizure from alcohol withdrawal   He is not in any pain  He has been to rehab once in the past as recently as earlier this year  He was in [facility] last summer and then went to rehab for 33 weeks after this  Since then he has relapsed with his alcohol use  He smokes cigarettes but denies the use of any other drugs  The patient denies fevers, headaches, syncope, vomiting, vision changes, hearing changes, chest pain, shortness of breath, cough, abdominal pain, changes in usual bowel movements, changes with urination, back pain, numbness or tingling, rashes, pain anywhere else in body  The patient has no sick contacts, recent travel history, new or changing medications, or immunocompromise            History provided by:  Patient   used: No    Withdrawal - Alcohol   Similar prior episodes: yes    Severity:  Moderate  Onset quality:  Gradual  Duration:  3 hours  Timing:  Constant  Progression:  Unchanged  Chronicity:  Recurrent  Suspected agents:  Alcohol  Associated symptoms: nausea and palpitations Associated symptoms: no abdominal pain, no agitation, no bladder incontinence, no bowel incontinence, no confusion, no hallucinations, no headaches, no seizures, no shortness of breath, no suicidal ideation, no violence, no vomiting and no weakness        Prior to Admission Medications   Prescriptions Last Dose Informant Patient Reported? Taking?   acetaminophen (TYLENOL) 325 mg tablet Not Taking at Unknown time  Yes No   Sig: Take 650 mg by mouth every 6 (six) hours as needed for mild pain or headaches   albuterol (VENTOLIN HFA) 90 mcg/act inhaler Past Month at Unknown time  No Yes   Sig: Inhale 2 puffs every 6 (six) hours as needed for wheezing      Facility-Administered Medications: None       Past Medical History:   Diagnosis Date    COPD (chronic obstructive pulmonary disease) ([facility] [address] )        History reviewed  No pertinent surgical history  History reviewed  No pertinent family history  I have reviewed and agree with the history as documented  Social History     Tobacco Use    Smoking status: Current Every Day Smoker     Packs/day: 1 00     Years: 40 00     Pack years: 40 00     Types: Cigarettes    Smokeless tobacco: Never Used   Substance Use Topics    Alcohol use: Yes     Comment: every day    Drug use: No        Review of Systems   Constitutional: Positive for appetite change (dec) and diaphoresis  Negative for activity change, chills, fatigue and fever  HENT: Negative for rhinorrhea and sore throat  Eyes: Negative for visual disturbance  Respiratory: Negative for cough and shortness of breath  Cardiovascular: Positive for palpitations  Negative for chest pain and leg swelling  Gastrointestinal: Positive for nausea  Negative for abdominal distention, abdominal pain, blood in stool, bowel incontinence, diarrhea and vomiting  Genitourinary: Negative for bladder incontinence, decreased urine volume, difficulty urinating, dysuria, flank pain and hematuria     Musculoskeletal: Negative for arthralgias, back pain, myalgias, neck pain and neck stiffness  Skin: Negative for rash and wound  Allergic/Immunologic: Negative for immunocompromised state  Neurological: Negative for dizziness, seizures, syncope, weakness, light-headedness, numbness and headaches  Hematological: Does not bruise/bleed easily  Psychiatric/Behavioral: Negative for agitation, confusion, hallucinations and suicidal ideas  All other systems reviewed and are negative  Physical Exam  ED Triage Vitals [10/12/19 1332]   Temperature Pulse Respirations Blood Pressure SpO2   (!) 97 1 °F (36 2 °C) (!) 111 19 (!) 161/110 95 %      Temp Source Heart Rate Source Patient Position - Orthostatic VS BP Location FiO2 (%)   Oral Monitor Sitting Right arm --      Pain Score       No Pain             Orthostatic Vital Signs  Vitals:    10/12/19 1332 10/12/19 1825 10/12/19 1829   BP: (!) 161/110 (!) 160/106 (!) 160/106   Pulse: (!) 111 92 92   Patient Position - Orthostatic VS: Sitting  Sitting       Physical Exam   Constitutional: He is oriented to person, place, and time  No distress  Thin, tremulous   HENT:   Head: Normocephalic and atraumatic  Mouth/Throat: Oropharynx is clear and moist  No oropharyngeal exudate  Eyes: Conjunctivae are normal  No scleral icterus  Neck: Neck supple  No JVD present  No tracheal deviation present  Cardiovascular: Regular rhythm and intact distal pulses  tachycardic   Pulmonary/Chest: Effort normal and breath sounds normal  No respiratory distress  Abdominal: Soft  He exhibits no distension and no mass  There is no tenderness  There is no guarding  Musculoskeletal: He exhibits no edema or deformity  Neurological: He is alert and oriented to person, place, and time  Moves all extremities   Skin: Skin is warm and dry  Capillary refill takes less than 2 seconds  No rash noted  He is not diaphoretic  Psychiatric: His behavior is normal    Vitals reviewed        ED Medications  Medications   diazepam (VALIUM) injection 5 mg (5 mg Intravenous Given 10/12/19 1420)   thiamine (VITAMIN B1) tablet 100 mg (100 mg Oral Given 44/65/39 3457)   folic acid (FOLVITE) tablet 1 mg (1 mg Oral Given 10/12/19 1421)   potassium chloride 20 mEq IVPB (premix) (0 mEq Intravenous Stopped 10/12/19 1712)   famotidine (PEPCID) tablet 20 mg (20 mg Oral Given 10/12/19 1613)   ondansetron (ZOFRAN-ODT) dispersible tablet 4 mg (4 mg Oral Given 10/12/19 1613)   magnesium sulfate IVPB (premix) SOLN 1 g (0 g Intravenous Stopped 10/12/19 1825)       Diagnostic Studies  Results Reviewed     Procedure Component Value Units Date/Time    Rapid drug screen, urine [[id number]]  (Normal) Collected:  10/12/19 1504    Lab Status:  Final result Specimen:  Urine, Clean Catch Updated:  10/12/19 1531     Amph/Meth UR Negative     Barbiturate Ur Negative     Benzodiazepine Urine Negative     Cocaine Urine Negative     Methadone Urine Negative     Opiate Urine Negative     PCP Ur Negative     THC Urine Negative    Narrative:       FOR MEDICAL PURPOSES ONLY  IF CONFIRMATION NEEDED PLEASE CONTACT THE LAB WITHIN 5 DAYS      Drug Screen Cutoff Levels:  AMPHETAMINE/METHAMPHETAMINES  1000 ng/mL  BARBITURATES     200 ng/mL  BENZODIAZEPINES     200 ng/mL  COCAINE      300 ng/mL  METHADONE      300 ng/mL  OPIATES      300 ng/mL  PHENCYCLIDINE     25 ng/mL  THC       50 ng/mL      Comprehensive metabolic panel [[id number]]  (Abnormal) Collected:  10/12/19 1420    Lab Status:  Final result Specimen:  Blood from Arm, Right Updated:  10/12/19 1448     Sodium 143 mmol/L      Potassium 3 1 mmol/L      Chloride 107 mmol/L      CO2 24 mmol/L      ANION GAP 12 mmol/L      BUN 10 mg/dL      Creatinine 0 67 mg/dL      Glucose 133 mg/dL      Calcium 9 0 mg/dL       U/L      ALT 50 U/L      Alkaline Phosphatase 91 U/L      Total Protein 7 0 g/dL      Albumin 4 1 g/dL      Total Bilirubin 1 25 mg/dL      eGFR 109 ml/min/1 73sq m Narrative:       National Kidney Disease Foundation guidelines for Chronic Kidney Disease (CKD):     Stage 1 with normal or high GFR (GFR > 90 mL/min/1 73 square meters)    Stage 2 Mild CKD (GFR = 60-89 mL/min/1 73 square meters)    Stage 3A Moderate CKD (GFR = 45-59 mL/min/1 73 square meters)    Stage 3B Moderate CKD (GFR = 30-44 mL/min/1 73 square meters)    Stage 4 Severe CKD (GFR = 15-29 mL/min/1 73 square meters)    Stage 5 End Stage CKD (GFR <15 mL/min/1 73 square meters)  Note: GFR calculation is accurate only with a steady state creatinine    Ethanol [[id number]]  (Abnormal) Collected:  10/12/19 1420    Lab Status:  Final result Specimen:  Blood from Arm, Right Updated:  10/12/19 1443     Ethanol Lvl 18 mg/dL     CBC and differential [[id number]]  (Abnormal) Collected:  10/12/19 1420    Lab Status:  Final result Specimen:  Blood from Arm, Right Updated:  10/12/19 1431     WBC 8 83 Thousand/uL      RBC 4 20 Million/uL      Hemoglobin 13 9 g/dL      Hematocrit 41 6 %      MCV 99 fL      MCH 33 1 pg      MCHC 33 4 g/dL      RDW 15 2 %      MPV 8 7 fL      Platelets 376 Thousands/uL      nRBC 0 /100 WBCs      Neutrophils Relative 72 %      Immat GRANS % 0 %      Lymphocytes Relative 18 %      Monocytes Relative 8 %      Eosinophils Relative 1 %      Basophils Relative 1 %      Neutrophils Absolute 6 40 Thousands/µL      Immature Grans Absolute 0 03 Thousand/uL      Lymphocytes Absolute 1 57 Thousands/µL      Monocytes Absolute 0 71 Thousand/µL      Eosinophils Absolute 0 05 Thousand/µL      Basophils Absolute 0 07 Thousands/µL     POCT alcohol breath test [[id number]]  (Normal) Resulted:  10/12/19 1402    Lab Status:  Final result Updated:  10/12/19 1402     EXTBreath Alcohol 0 025                 No orders to display         Procedures  Procedures        ED Course  ED Course as of Oct 12 1844   Sat Oct 12, 2019   1545 Host has seen pt and is arranging for placement MDM  Number of Diagnoses or Management Options  Diagnosis management comments: Patient for alcohol withdrawal   Patient has never had a seizure from alcohol withdrawal before but has experienced severe symptoms otherwise  Will monitor patient treat him for withdrawal   Crisis referral placed  Amount and/or Complexity of Data Reviewed  Clinical lab tests: ordered and reviewed  Tests in the medicine section of CPT®: ordered and reviewed  Review and summarize past medical records: yes  Discuss the patient with other providers: yes    Risk of Complications, Morbidity, and/or Mortality  Presenting problems: moderate  Diagnostic procedures: low  Management options: low    Patient Progress  Patient progress: stable      Disposition  Final diagnoses:   Alcohol withdrawal syndrome without complication ([address] )     Time reflects when diagnosis was documented in both MDM as applicable and the Disposition within this note     Time User Action Codes Description Comment    10/12/2019  6:14 PM Miriam Carpenter Add [F10 230] Alcohol withdrawal syndrome without complication [facility])       ED Disposition     ED Disposition Condition Date/Time Comment    Admit Stable Sat Oct 12, 2019  6:14 PM Case was discussed with Dr Jose Ivory and the patient's admission status was agreed to be Admission Status: observation status to the service of SLIM  Follow-up Information    None         Patient's Medications   Discharge Prescriptions    No medications on file     No discharge procedures on file  ED Provider  Attending physically available and evaluated Olamide Mac I managed the patient along with the ED Attending      Electronically Signed by         Lakisha Castellano DO  10/12/19 4954 [facility],   10/12/19 1790

## 2019-10-12 NOTE — ED ATTENDING ATTESTATION
10/12/2019  I, Melinda Moon DO, saw and evaluated the patient  I have discussed the patient with the resident/non-physician practitioner and agree with the resident's/non-physician practitioner's findings, Plan of Care, and MDM as documented in the resident's/non-physician practitioner's note, except where noted  All available labs and Radiology studies were reviewed  I was present for key portions of any procedure(s) performed by the resident/non-physician practitioner and I was immediately available to provide assistance  At this point I agree with the current assessment done in the Emergency Department  I have conducted an independent evaluation of this patient a history and physical is as follows:    ED Course       45 y/o male presenting for alcohol detoxification  Patient states he drinks alcohol every few hours  He has been drinking for many years  Last drink was approximately 3 hours ago  He presents tachycardic and tremulous  Has not had any alcohol withdrawal seizures in the past   Would like to help stop drinking  He does state he has to wake up during the night to have a drink  Patient denies any medical complaints  Denies any abdominal pain, headaches, fever, chills, chest pain, shortness of breath  Will check labs, alcohol level, have patient evaluated by crisis/host program, will administer IV Valium in the interim  Will treat with IV Valium, will replace thiamine, folic acid, multivitamin, continue IV fluids  Will replete potassium  Repeat Valium as needed          Critical Care Time  CriticalCare Time  Performed by: Melinda Moon DO  Authorized by: Melinda Moon DO     Critical care provider statement:     Critical care time (minutes):  40    Critical care time was exclusive of:  Separately billable procedures and treating other patients and teaching time    Critical care was necessary to treat or prevent imminent or life-threatening deterioration of the following conditions:  Toxidrome and hepatic failure    Critical care was time spent personally by me on the following activities:  Blood draw for specimens, obtaining history from patient or surrogate, development of treatment plan with patient or surrogate, discussions with consultants, discussions with primary provider, evaluation of patient's response to treatment, examination of patient, review of old charts, re-evaluation of patient's condition, ordering and performing treatments and interventions and ordering and review of laboratory studies    I assumed direction of critical care for this patient from another provider in my specialty: no    Comments:      Repeated dosage is of Valium for alcohol withdrawal syndrome

## 2019-10-13 ENCOUNTER — HOSPITAL ENCOUNTER (EMERGENCY)
Facility: HOSPITAL | Age: 54
Discharge: HOME | End: 2019-10-13
Attending: EMERGENCY MEDICINE | Admitting: EMERGENCY MEDICINE
Payer: COMMERCIAL

## 2019-10-13 VITALS
WEIGHT: 175.04 LBS | RESPIRATION RATE: 20 BRPM | DIASTOLIC BLOOD PRESSURE: 99 MMHG | BODY MASS INDEX: 23.09 KG/M2 | OXYGEN SATURATION: 97 % | SYSTOLIC BLOOD PRESSURE: 154 MMHG | TEMPERATURE: 98 F | HEART RATE: 96 BPM

## 2019-10-13 VITALS
DIASTOLIC BLOOD PRESSURE: 110 MMHG | HEART RATE: 81 BPM | OXYGEN SATURATION: 96 % | RESPIRATION RATE: 20 BRPM | SYSTOLIC BLOOD PRESSURE: 146 MMHG | TEMPERATURE: 98.5 F

## 2019-10-13 DIAGNOSIS — F10.10 ALCOHOL ABUSE: Primary | ICD-10-CM

## 2019-10-13 DIAGNOSIS — F17.200 SMOKING: ICD-10-CM

## 2019-10-13 DIAGNOSIS — F10.239 ALCOHOL WITHDRAWAL (HCC): ICD-10-CM

## 2019-10-13 LAB
ANION GAP SERPL CALCULATED.3IONS-SCNC: 5 MMOL/L (ref 4–13)
BASOPHILS # BLD AUTO: 0.06 THOUSANDS/ΜL (ref 0–0.1)
BASOPHILS NFR BLD AUTO: 1 % (ref 0–1)
BUN SERPL-MCNC: 10 MG/DL (ref 5–25)
CALCIUM SERPL-MCNC: 8.6 MG/DL (ref 8.3–10.1)
CHLORIDE SERPL-SCNC: 112 MMOL/L (ref 100–108)
CO2 SERPL-SCNC: 27 MMOL/L (ref 21–32)
CREAT SERPL-MCNC: 0.64 MG/DL (ref 0.6–1.3)
EOSINOPHIL # BLD AUTO: 0.16 THOUSAND/ΜL (ref 0–0.61)
EOSINOPHIL NFR BLD AUTO: 3 % (ref 0–6)
ERYTHROCYTE [DISTWIDTH] IN BLOOD BY AUTOMATED COUNT: 15.1 % (ref 11.6–15.1)
ETHANOL EXG-MCNC: 0 MG/DL
GFR SERPL CREATININE-BSD FRML MDRD: 111 ML/MIN/1.73SQ M
GLUCOSE SERPL-MCNC: 85 MG/DL (ref 65–140)
HCT VFR BLD AUTO: 44.9 % (ref 36.5–49.3)
HGB BLD-MCNC: 14.6 G/DL (ref 12–17)
IMM GRANULOCYTES # BLD AUTO: 0.03 THOUSAND/UL (ref 0–0.2)
IMM GRANULOCYTES NFR BLD AUTO: 1 % (ref 0–2)
LYMPHOCYTES # BLD AUTO: 1.58 THOUSANDS/ΜL (ref 0.6–4.47)
LYMPHOCYTES NFR BLD AUTO: 24 % (ref 14–44)
MCH RBC QN AUTO: 32.7 PG (ref 26.8–34.3)
MCHC RBC AUTO-ENTMCNC: 32.5 G/DL (ref 31.4–37.4)
MCV RBC AUTO: 101 FL (ref 82–98)
MONOCYTES # BLD AUTO: 0.67 THOUSAND/ΜL (ref 0.17–1.22)
MONOCYTES NFR BLD AUTO: 10 % (ref 4–12)
NEUTROPHILS # BLD AUTO: 3.99 THOUSANDS/ΜL (ref 1.85–7.62)
NEUTS SEG NFR BLD AUTO: 61 % (ref 43–75)
NRBC BLD AUTO-RTO: 0 /100 WBCS
PLATELET # BLD AUTO: 320 THOUSANDS/UL (ref 149–390)
PMV BLD AUTO: 9.1 FL (ref 8.9–12.7)
POTASSIUM SERPL-SCNC: 4 MMOL/L (ref 3.5–5.3)
RBC # BLD AUTO: 4.46 MILLION/UL (ref 3.88–5.62)
SODIUM SERPL-SCNC: 144 MMOL/L (ref 136–145)
WBC # BLD AUTO: 6.49 THOUSAND/UL (ref 4.31–10.16)

## 2019-10-13 PROCEDURE — 80048 BASIC METABOLIC PNL TOTAL CA: CPT | Performed by: INTERNAL MEDICINE

## 2019-10-13 PROCEDURE — 82075 ASSAY OF BREATH ETHANOL: CPT | Performed by: EMERGENCY MEDICINE

## 2019-10-13 PROCEDURE — 85025 COMPLETE CBC W/AUTO DIFF WBC: CPT | Performed by: INTERNAL MEDICINE

## 2019-10-13 PROCEDURE — 96365 THER/PROPH/DIAG IV INF INIT: CPT

## 2019-10-13 PROCEDURE — 96375 TX/PRO/DX INJ NEW DRUG ADDON: CPT

## 2019-10-13 PROCEDURE — 99284 EMERGENCY DEPT VISIT MOD MDM: CPT | Performed by: EMERGENCY MEDICINE

## 2019-10-13 PROCEDURE — 99406 BEHAV CHNG SMOKING 3-10 MIN: CPT | Performed by: EMERGENCY MEDICINE

## 2019-10-13 PROCEDURE — 99283 EMERGENCY DEPT VISIT LOW MDM: CPT

## 2019-10-13 RX ORDER — LORAZEPAM 2 MG/ML
1 INJECTION INTRAMUSCULAR ONCE
Status: COMPLETED | OUTPATIENT
Start: 2019-10-13 | End: 2019-10-13

## 2019-10-13 RX ORDER — OXAZEPAM 15 MG/1
15 CAPSULE ORAL EVERY 8 HOURS SCHEDULED
Status: DISCONTINUED | OUTPATIENT
Start: 2019-10-13 | End: 2019-10-13 | Stop reason: HOSPADM

## 2019-10-13 RX ORDER — PHENOBARBITAL SODIUM 65 MG/ML
65 INJECTION INTRAMUSCULAR ONCE
Status: COMPLETED | OUTPATIENT
Start: 2019-10-13 | End: 2019-10-13

## 2019-10-13 RX ORDER — DIAZEPAM 5 MG/1
10 TABLET ORAL ONCE
Status: COMPLETED | OUTPATIENT
Start: 2019-10-13 | End: 2019-10-13

## 2019-10-13 RX ORDER — MAGNESIUM SULFATE HEPTAHYDRATE 40 MG/ML
2 INJECTION, SOLUTION INTRAVENOUS ONCE
Status: COMPLETED | OUTPATIENT
Start: 2019-10-13 | End: 2019-10-13

## 2019-10-13 RX ADMIN — PHENOBARBITAL SODIUM 65 MG: 65 INJECTION INTRAMUSCULAR; INTRAVENOUS at 11:23

## 2019-10-13 RX ADMIN — LORAZEPAM 1 MG: 2 INJECTION, SOLUTION INTRAMUSCULAR; INTRAVENOUS at 07:35

## 2019-10-13 RX ADMIN — MAGNESIUM SULFATE HEPTAHYDRATE 2 G: 40 INJECTION, SOLUTION INTRAVENOUS at 11:23

## 2019-10-13 RX ADMIN — OXAZEPAM 15 MG: 15 CAPSULE, GELATIN COATED ORAL at 07:54

## 2019-10-13 RX ADMIN — ACETAMINOPHEN 650 MG: 325 TABLET ORAL at 07:52

## 2019-10-13 RX ADMIN — DIAZEPAM 10 MG: 5 TABLET ORAL at 10:44

## 2019-10-13 NOTE — PROGRESS NOTES
Pt received ativan and serax for alcohol withdrawal symptoms, then he asked if he can go outside to smoke  Nurse explained he can't, as per hospital protocol  Nurse offered a nicotine patch which pt agreed  Nurse when to get it  Patient left the room at that moment  Nursing personnel searched elevators, hallways and stairs unsuccessfully  Security was called and reported  IV was found in the room, removed by pt  SOD was notified  Charge Nurse called family to notified them as well

## 2019-10-13 NOTE — ED NOTES
Fabby from HOST called to advise that [facility] has detox bed  The need to speak with patient first   Family in patient's room and provided with phone number to call which is [phone]

## 2019-10-13 NOTE — PLAN OF CARE
Problem: PAIN - ADULT  Goal: Verbalizes/displays adequate comfort level or baseline comfort level  Description  Interventions:  - Encourage patient to monitor pain and request assistance  - Assess pain using appropriate pain scale  - Administer analgesics based on type and severity of pain and evaluate response  - Implement non-pharmacological measures as appropriate and evaluate response  - Consider cultural and social influences on pain and pain management  - Notify physician/advanced practitioner if interventions unsuccessful or patient reports new pain  Outcome: Progressing     Problem: SAFETY ADULT  Goal: Patient will remain free of falls  Description  INTERVENTIONS:  - Assess patient frequently for physical needs  -  Identify cognitive and physical deficits and behaviors that affect risk of falls    -  [place] fall precautions as indicated by assessment   - Educate patient/family on patient safety including physical limitations  - Instruct patient to call for assistance with activity based on assessment  - Modify environment to reduce risk of injury  - Consider OT/PT consult to assist with strengthening/mobility  Outcome: Progressing  Goal: Maintain or return to baseline ADL function  Description  INTERVENTIONS:  -  Assess patient's ability to carry out ADLs; assess patient's baseline for ADL function and identify physical deficits which impact ability to perform ADLs (bathing, care of mouth/teeth, toileting, grooming, dressing, etc )  - Assess/evaluate cause of self-care deficits   - Assess range of motion  - Assess patient's mobility; develop plan if impaired  - Assess patient's need for assistive devices and provide as appropriate  - Encourage maximum independence but intervene and supervise when necessary  - Involve family in performance of ADLs  - Assess for home care needs following discharge   - Consider OT consult to assist with ADL evaluation and planning for discharge  - Provide patient education as appropriate  Outcome: Progressing  Goal: Maintain or return mobility status to optimal level  Description  INTERVENTIONS:  - Assess patient's baseline mobility status (ambulation, transfers, stairs, etc )    - Identify cognitive and physical deficits and behaviors that affect mobility  - Identify mobility aids required to assist with transfers and/or ambulation (gait belt, sit-to-stand, lift, walker, cane, etc )  - [place] fall precautions as indicated by assessment  - Record patient progress and toleration of activity level on Mobility SBAR; progress patient to next Phase/Stage  - Instruct patient to call for assistance with activity based on assessment  - Consider rehabilitation consult to assist with strengthening/weightbearing, etc   Outcome: Progressing     Problem: DISCHARGE PLANNING  Goal: Discharge to home or other facility with appropriate resources  Description  INTERVENTIONS:  - Identify barriers to discharge w/patient and caregiver  - Arrange for needed discharge resources and transportation as appropriate  - Identify discharge learning needs (meds, wound care, etc )  - Arrange for interpretive services to assist at discharge as needed  - Refer to Case Management Department for coordinating discharge planning if the patient needs post-hospital services based on physician/advanced practitioner order or complex needs related to functional status, cognitive ability, or social support system  Outcome: Progressing     Problem: Knowledge Deficit  Goal: Patient/family/caregiver demonstrates understanding of disease process, treatment plan, medications, and discharge instructions  Description  Complete learning assessment and assess knowledge base    Interventions:  - Provide teaching at level of understanding  - Provide teaching via preferred learning methods  Outcome: Progressing     Problem: METABOLIC, FLUID AND ELECTROLYTES - ADULT  Goal: Electrolytes maintained within normal limits  Description  INTERVENTIONS:  - Monitor labs and assess patient for signs and symptoms of electrolyte imbalances  - Administer electrolyte replacement as ordered  - Monitor response to electrolyte replacements, including repeat lab results as appropriate  - Instruct patient on fluid and nutrition as appropriate  Outcome: Progressing  Goal: Fluid balance maintained  Description  INTERVENTIONS:  - Monitor labs   - Monitor I/O and WT  - Instruct patient on fluid and nutrition as appropriate  - Assess for signs & symptoms of volume excess or deficit  Outcome: Progressing

## 2019-10-13 NOTE — ED NOTES
Pt in waiting room with brother-in-law waiting for his sister/guardian  Does not want to come to room until sister arrives       Era Hickey RN  10/13/19 6758

## 2019-10-13 NOTE — ASSESSMENT & PLAN NOTE
Patient presented with tremors, palpitations, nausea, agitation  Patient's last drink was around 9:00 a m  This morning  Patient received 1 dose of IV Valium 5 mg in the ED  On my assessment patient remains tremulous and anxious  Patient's tachycardia and blood pressure have improved  Plan:  -continue to monitor for further withdrawal symptoms  CIWA protocol  Will give 1 time dose of Ativan at this time    Will place on Sirax scheduled until alcohol withdrawal symptoms improved  -can give additional doses of benzodiazepines if patient remains persistently agitated  -continue to monitor electrolytes and replete as needed  -monitor on telemetry  -continue follow-up with case management and host services

## 2019-10-13 NOTE — ED PROVIDER NOTES
History  Chief Complaint   Patient presents with    Detox Evaluation     pt here for ETOH detox before rehab  left this morning AMA and brought back by brother-in-law  47 y/o M with 40 year hx of alcohol abuse who eloped for inpatient ([facility]) this am due to withdrawal sx  Pt's sister has court order for POA(at bedside with document)  Pt is brought by family for HOST placement into a detox facility  Pt drinks multiple drinks(beer, vodka, and cooking products) daily  Pt's last drink was 24 hrs ago  Pt has not current Sx of withdrawal  NO SI, HI, Hallucinations       Denies fever/chills, nausea/vomiting, lightheadedness/dizziness, numbness/weakness, headache, change in vision, URI symptoms, neck pain, chest pain, palpitations, shortness of breath, cough, back pain, flank pain, abdominal pain, diarrhea, hematochezia, melena, dysuria, hematuria, abnormal genital d/c     Thiamine 100mg over 30 mins prior to D5NS  Folate 1 g  Mg 2g IV to help prevent thiamine resistance    Host Eval, Admit to [facility] if no bed available  PHENOBARBITAL FOR ALCOHOL WITHDRAWAL PROTOCOL:  ANXIETY: Do you feel that something bad   is about to happen to you right now? Score 0    NAUSEA and DRY HEAVES or VOMITING? Score 0   SWEATING: (includes moist palms, sweating  Now)? Score 0  TREMOR: with arms extended, eyes closed? Score 2 XXX  AGITATION: fidgety, restless, pacing?  Score 0   DISORIENTATION:  Knows name and place, but not date Score 0  Knows name only Score 0  HALLUCINATIONS  Auditory only (check for major psychotic disorder) Score 0  Visual, tactile, olfactory, gustatory (any) Score 0  VITAL SIGNS: ANY of the following  Pulse >252, Diastolic BP >12, Temperature >99 6 Score 3 XXX    Total Score = 5    Total Score of 5 or less = lower risk for withdrawal   Total Score of 6 or more = moderate risk for withd mary  Total Score of 12 or more = high risk for withdrawal     MILD SEWS SCORE (1-6) XXX  Administer diazepam 10 mg PO X1 (unless unable to take PO)  Administer phenobarbital 65 mg IV x1 and then another 65 mg IV q60 min PRN (max 5 doses total)            Prior to Admission Medications   Prescriptions Last Dose Informant Patient Reported? Taking?   acetaminophen (TYLENOL) 325 mg tablet   Yes Yes   Sig: Take 650 mg by mouth every 6 (six) hours as needed for mild pain or headaches   albuterol (VENTOLIN HFA) 90 mcg/act inhaler   No Yes   Sig: Inhale 2 puffs every 6 (six) hours as needed for wheezing      Facility-Administered Medications: None       Past Medical History:   Diagnosis Date    COPD (chronic obstructive pulmonary disease) ([facility] [address] )        History reviewed  No pertinent surgical history  History reviewed  No pertinent family history  I have reviewed and agree with the history as documented  Social History     Tobacco Use    Smoking status: Current Every Day Smoker     Packs/day: 1 00     Years: 40 00     Pack years: 40 00     Types: Cigarettes    Smokeless tobacco: Never Used   Substance Use Topics    Alcohol use: Yes     Comment: every day    Drug use: No        Review of Systems   Constitutional: Negative for chills, diaphoresis, fatigue and fever  HENT: Negative for congestion, ear discharge, facial swelling, hearing loss, rhinorrhea, sinus pressure, sinus pain, sneezing, sore throat, tinnitus and trouble swallowing  Eyes: Negative for pain, discharge and redness  Respiratory: Negative for cough, choking, chest tightness, shortness of breath, wheezing and stridor  Cardiovascular: Negative for chest pain, palpitations and leg swelling  Gastrointestinal: Negative for abdominal distention, abdominal pain, blood in stool, constipation, diarrhea, nausea and vomiting  Endocrine: Negative for cold intolerance, polydipsia and polyuria  Genitourinary: Negative for difficulty urinating, dysuria, enuresis, flank pain, frequency and hematuria     Musculoskeletal: Negative for arthralgias, back pain, gait problem and neck stiffness  Skin: Negative for rash and wound  Neurological: Negative for dizziness, seizures, syncope, weakness, numbness and headaches  Hematological: Negative for adenopathy  Psychiatric/Behavioral: Negative for agitation, confusion, hallucinations, sleep disturbance and suicidal ideas  All other systems reviewed and are negative  Physical Exam  ED Triage Vitals [10/13/19 1022]   Temperature Pulse Respirations Blood Pressure SpO2   98 °F (36 7 °C) 96 20 154/99 97 %      Temp Source Heart Rate Source Patient Position - Orthostatic VS BP Location FiO2 (%)   Oral -- -- -- --      Pain Score       No Pain             Orthostatic Vital Signs  Vitals:    10/13/19 1022   BP: 154/99   Pulse: 96       Physical Exam   Constitutional: He is oriented to person, place, and time  He appears well-developed and well-nourished  No distress  HENT:   Head: Normocephalic and atraumatic  Eyes: Conjunctivae and EOM are normal    Neck: Normal range of motion  Cardiovascular: Normal rate and regular rhythm  Exam reveals no gallop and no friction rub  No murmur heard  Pulmonary/Chest: Breath sounds normal  No respiratory distress  He has no wheezes  He has no rales  Abdominal: Soft  Normal appearance and bowel sounds are normal  There is no tenderness  There is no rigidity, no rebound, no guarding, no CVA tenderness, no tenderness at McBurney's point and negative Aburto's sign  Neurological: He is alert and oriented to person, place, and time  No cranial nerve deficit or sensory deficit  GCS eye subscore is 4  GCS verbal subscore is 5  GCS motor subscore is 6  Reflex Scores:       Bicep reflexes are 2+ on the right side and 2+ on the left side  Patellar reflexes are 2+ on the right side and 2+ on the left side  Patient has equal 5/5 strength b/l UE and Le  No focal neuro deficits noted  Mid tremulous b/l UE   Skin: Skin is warm and dry  Capillary refill takes less than 2 seconds  He is not diaphoretic  Psychiatric: He has a normal mood and affect  His behavior is normal  Judgment and thought content normal    Nursing note and vitals reviewed  ED Medications  Medications   folic acid 1 mg in sodium chloride 0 9 % 50 mL IVPB (has no administration in time range)   magnesium sulfate 2 g/50 mL IVPB (premix) 2 g (2 g Intravenous New Bag 10/13/19 1123)   thiamine (VITAMIN B1) 100 mg in sodium chloride 0 9 % 50 mL IVPB (has no administration in time range)   diazepam (VALIUM) tablet 10 mg (10 mg Oral Given 10/13/19 1044)   PHENobarbital 65 mg/mL injection 65 mg (65 mg Intravenous Given 10/13/19 1123)       Diagnostic Studies  Results Reviewed     Procedure Component Value Units Date/Time    POCT alcohol breath test [[id number]]  (Normal) Resulted:  10/13/19 1053    Lab Status:  Final result Updated:  10/13/19 1053     EXTBreath Alcohol 0 000    Rapid drug screen, urine [[id number]]     Lab Status:  No result Specimen:  Urine                  No orders to display         Procedures  Procedures        ED Course                               MDM  Number of Diagnoses or Management Options  Alcohol abuse: new and requires workup  Alcohol withdrawal ([facility] [address] ): new and requires workup  Smoking: new and requires workup  Diagnosis management comments: 1  Alcohol abuse  -Crisis, BAT, UDS, valium, Phenobarb  -HOST  at 1400hrs at his home  -D/C to sister and brother inlaw    2  Tobacco abuse  The patient was counseled as to the multiple risks to her health from continued use of tobacco products  It was explained that continuing to smoke may lead to multiple short and long term negative health consequences, including but not limited to mouth/esophageal/lung cancer, COPD, and heart disease  The patient verbalizes understanding of these risks, and also understands the options and resources available to help her stop smoking    Nicotine replacement therapy, local hotlines, and local resources were discussed as viable options for helping to stop her tobacco use  The total time spent counseling the patient regarding tobacco cessation was 4 minutes  Face-to-face smoking cessation counseling was provided to the patient  The patient was awake  I spent 4 minutes counseling the patient on smoking cessation and its associated health benefits  The patient displayed NO receptiveness to the counseling session  The following interventions were offered to the patient:  Over-the-counter nicotine patch and/or gum  Outpatient physical and written resources were provided with discharge instructions        Disposition  Final diagnoses:   Alcohol abuse   Alcohol withdrawal ([facility] [address] )   Smoking     Time reflects when diagnosis was documented in both MDM as applicable and the Disposition within this note     Time User Action Codes Description Comment    10/13/2019 11:44 AM Pradeep Dangelo Add [F10 10] Alcohol abuse     10/13/2019 11:44 AM Pradeep Dangelo Add [F10 239] Alcohol withdrawal ([facility] [address] )     10/13/2019 11:45 AM Cal Caba Add [F17 200] Smoking       ED Disposition     ED Disposition Condition Date/Time Comment    Discharge Stable Sun Oct 13, 2019 11:44 AM Shabbir Boss discharge to home/self care  Follow-up Information     Follow up With Specialties Details Why Contact Info Additional Information    Infolink  Schedule an appointment as soon as possible for a visit in 1 day  [address] [place] [address] Emergency Department Emergency Medicine Go to  If symptoms worsen, As needed [address] [facility] ED, [address], [place], [address] [phone]          Patient's Medications   Discharge Prescriptions    No medications on file     No discharge procedures on file  ED Provider  Attending physically available and evaluated Shabbir Boss I managed the patient along with the ED Attending      Electronically Signed by         [place] DO Bienvenido  10/13/19 [facility]

## 2019-10-13 NOTE — ED ATTENDING ATTESTATION
10/13/2019  I, Nathalia Jones DO, saw and evaluated the patient  I have discussed the patient with the resident/non-physician practitioner and agree with the resident's/non-physician practitioner's findings, Plan of Care, and MDM as documented in the resident's/non-physician practitioner's note, except where noted  All available labs and Radiology studies were reviewed  I was present for key portions of any procedure(s) performed by the resident/non-physician practitioner and I was immediately available to provide assistance  At this point I agree with the current assessment done in the Emergency Department  I have conducted an independent evaluation of this patient a history and physical is as follows:    ED Course       Patient is a 34-year-old male who was admitted to the hospital yesterday for alcohol withdrawal syndrome and was awaiting placement for an alcohol rehabilitation facility  He eloped from the inpatient part of the hospital   Family brought him back today  He states the last drink was 24 hours ago  No seizure activity  Will administer phenobarbital per protocol, thiamine, folate, magnesium and have patient evaluated by crisis  Patient will be picked up and transported to rehabilitation facility at 2 o'clock at his [facility]      Critical Care Time  Procedures

## 2019-10-13 NOTE — ED NOTES
[address] Marla Wheeler for pt to leave without receiving thiamine per dr Deisy Winter, RN  10/13/19 5631

## 2019-10-13 NOTE — ED NOTES
Patient and family on the phone with [facility] [address]  They have a bed for him and will arrange for transport at 2:00 pm   Patient will be picked up at family's home  Fabby from HOST was advised of this

## 2019-10-13 NOTE — H&P
INTERNAL MEDICINE RESIDENCY ADMISSION H&P     Name: Bronson Narvaez   Age & Sex: 46 y o  male   MRN: [id number]  Unit/Bed#: [facility] 731-01   Encounter: [id number]  Primary Care Provider: No primary care provider on file  Code Status: Level 1 - Full Code  Admission Status: OBSERVATION  Disposition: Patient requires Med/Surg with Telemetry    ASSESSMENT/PLAN     Principal Problem:    Alcohol withdrawal ([place] [address] )  Active Problems:    Nicotine dependence    Chronic obstructive pulmonary disease with acute exacerbation ([facility])      * Alcohol withdrawal ([place] [address] )  Assessment & Plan  Patient presented with tremors, palpitations, nausea, agitation  Patient's last drink was around 9:00 a m  This morning  Patient received 1 dose of IV Valium 5 mg in the ED  On my assessment patient remains tremulous and anxious  Patient's tachycardia and blood pressure have improved  Plan:  -continue to monitor for further withdrawal symptoms  CIWA protocol  -can give additional doses of benzodiazepines if patient remains persistently agitated  -continue to monitor electrolytes and replete as needed  -monitor on telemetry  -continue follow-up with case management and host services    Chronic obstructive pulmonary disease with acute exacerbation ([address] )  Assessment & Plan  Appears stable  Albuterol p r n  Nicotine dependence  Assessment & Plan  Daily nicotine patch  Tobacco cessation advised      VTE Pharmacologic Prophylaxis: Reason for no pharmacologic prophylaxis Low risk  VTE Mechanical Prophylaxis: reason for no mechanical VTE prophylaxis Low risk  Ambulate patient    CHIEF COMPLAINT     Chief Complaint   Patient presents with    Withdrawal - Alcohol     Pt reports wanting detox  Pt's last drink was this morning  Pt go through withdrawal, pt reports shaking "pretty bad  "Pt reports going to see HOST representative, Timothy Alfonso, who knows he is here         HISTORY OF PRESENT ILLNESS     Patient is a 25-year-old male with a past medical history significant for alcohol abuse, tobacco abuse, possible COPD who presents with signs and symptoms of alcohol withdrawal   Patient apparently was in [facility] last summer and went to rehab for 33 weeks after this  Patient states that he has relapsed with alcohol over the past several months  He drinks up to 5 beers and several pints of vodka daily  Patient's last drink was around 9:00 a m  This morning  Patient came in complaining of tremors, palpitations, nausea, agitation  On my assessment, patient remains somewhat tremulous and agitated  Denies SI/HI, visual and auditory hallucinations, tactile disturbances  Patient also has an extensive smoking history and is a current smoker  Patient admitted for further management of alcohol withdrawal and for HOST services while bed search for detox facility is in process    REVIEW OF SYSTEMS     Review of Systems   Constitutional: Negative for chills and fever  HENT: Negative for sore throat and trouble swallowing  Eyes: Negative for photophobia and visual disturbance  Respiratory: Negative for shortness of breath and wheezing  Cardiovascular: Positive for palpitations  Negative for chest pain  Gastrointestinal: Negative for abdominal distention, abdominal pain, diarrhea and vomiting  Genitourinary: Negative for dysuria and frequency  Musculoskeletal: Negative for arthralgias and back pain  Skin: Negative for color change and rash  Neurological: Positive for tremors  Psychiatric/Behavioral: Positive for agitation  Negative for behavioral problems, decreased concentration and hallucinations  The patient is nervous/anxious and is hyperactive        OBJECTIVE     Vitals:    10/12/19 1829 10/12/19 1859 10/12/19 2000 10/12/19 2028   BP: (!) 160/106 136/75 137/79 137/79   BP Location: Left arm      Pulse: 92 86  85   Resp:  22     Temp:  98 6 °F (37 °C)     TempSrc:       SpO2: 97% 93%  93%      Temperature:   Temp (24hrs), Av 9 °F (36 6 °C), Min:97 1 °F (36 2 °C), Max:98 6 °F (37 °C)    Temperature: 98 6 °F (37 °C)  Intake & Output:  I/O       10/11 0701 - 10/12 0700 10/12 0701 - 10/13 0700    IV Piggyback  50    Total Intake  50    Net  +50              Weights: There is no height or weight on file to calculate BMI  Weight (last 2 days)     None        Physical Exam   Constitutional: He is oriented to person, place, and time  He appears well-developed and well-nourished  No distress  HENT:   Head: Normocephalic and atraumatic  Mouth/Throat: No oropharyngeal exudate  Eyes: Right eye exhibits no discharge  Left eye exhibits no discharge  No scleral icterus  Cardiovascular: Normal rate, regular rhythm and normal heart sounds  Exam reveals no friction rub  No murmur heard  Pulmonary/Chest: Effort normal and breath sounds normal  He has no wheezes  He has no rales  Abdominal: Soft  Bowel sounds are normal  He exhibits no distension  There is no tenderness  Musculoskeletal: He exhibits no edema or tenderness  Neurological: He is alert and oriented to person, place, and time  No sensory deficit  Tremulous   Skin: Skin is warm and dry  No erythema  Psychiatric:   Anxious appearing     PAST MEDICAL HISTORY     Past Medical History:   Diagnosis Date    COPD (chronic obstructive pulmonary disease) ([facility] [address] )      PAST SURGICAL HISTORY   History reviewed  No pertinent surgical history  SOCIAL & FAMILY HISTORY     Social History     Substance and Sexual Activity   Alcohol Use Yes    Comment: every day       Social History     Substance and Sexual Activity   Drug Use No     Social History     Tobacco Use   Smoking Status Current Every Day Smoker    Packs/day: 1 00    Years: 40 00    Pack years: 40 00    Types: Cigarettes   Smokeless Tobacco Never Used     History reviewed  No pertinent family history  LABORATORY DATA     Labs: I have personally reviewed pertinent reports      Results from last 7 days   Lab Units 10/12/19  1420   WBC Thousand/uL 8 83   HEMOGLOBIN g/dL 13 9   HEMATOCRIT % 41 6   PLATELETS Thousands/uL 310   NEUTROS PCT % 72   MONOS PCT % 8      Results from last 7 days   Lab Units 10/12/19  1420   POTASSIUM mmol/L 3 1*   CHLORIDE mmol/L 107   CO2 mmol/L 24   BUN mg/dL 10   CREATININE mg/dL 0 67   CALCIUM mg/dL 9 0   ALK PHOS U/L 91   ALT U/L 50   AST U/L 123*                          Micro:  No results found for: BLOODCX, URINECX, WOUNDCULT, SPUTUMCULTUR  IMAGING & DIAGNOSTIC TESTS     Imaging: I have personally reviewed pertinent reports  No results found  EKG, Pathology, and Other Studies: I have personally reviewed pertinent reports  ALLERGIES   No Known Allergies  MEDICATIONS PRIOR TO ARRIVAL     Prior to Admission medications    Medication Sig Start Date End Date Taking?  Authorizing Provider   albuterol (VENTOLIN HFA) 90 mcg/act inhaler Inhale 2 puffs every 6 (six) hours as needed for wheezing 1/27/19  Yes Sosa Shortid, DO   acetaminophen (TYLENOL) 325 mg tablet Take 650 mg by mouth every 6 (six) hours as needed for mild pain or headaches    Historical Provider, MD     MEDICATIONS ADMINISTERED IN LAST 24 HOURS     Medication Administration - last 24 hours from 10/11/2019 2049 to 10/12/2019 2049       Date/Time Order Dose Route Action Action by     10/12/2019 1420 diazepam (VALIUM) injection 5 mg 5 mg Intravenous Given Saul Fabry, RN     10/12/2019 1421 thiamine (VITAMIN B1) tablet 100 mg 100 mg Oral Given Saul Fabry, RN     14/24/1622 6946 folic acid (FOLVITE) tablet 1 mg 1 mg Oral Given Saul Fabry, RN     10/12/2019 1712 potassium chloride 20 mEq IVPB (premix) 0 mEq Intravenous Stopped Anni Saba RN     10/12/2019 1512 potassium chloride 20 mEq IVPB (premix) 20 mEq Intravenous [address] Tomás Richter RN     10/12/2019 1613 famotidine (PEPCID) tablet 20 mg 20 mg Oral Given Anni Saba RN     10/12/2019 1613 ondansetron (ZOFRAN-ODT) dispersible tablet 4 mg 4 mg Oral Given Yessi RAMIREZ Stephanie Worrell, BRYAN     10/12/2019 1825 magnesium sulfate IVPB (premix) SOLN 1 g 0 g Intravenous Stopped Tin Silva RN     10/12/2019 1720 magnesium sulfate IVPB (premix) SOLN 1 g 1 g Intravenous New [address] [facility] Riojasvirgilio Tolbert RN     10/12/2019 2027 potassium chloride (K-DUR,KLOR-CON) CR tablet 40 mEq 40 mEq Oral Given Humera Jo RN        CURRENT MEDICATIONS       Current Facility-Administered Medications:  acetaminophen 650 mg Oral Q6H PRN Arsh De Anda DO   albuterol 2 puff Inhalation Q6H PRN Arsh Yang DO   [START ON 38/62/2272] folic acid 1 mg Oral Daily Arsh Yang DO   [START ON 10/13/2019] nicotine 1 patch Transdermal Daily Arsh De Anda DO          acetaminophen 650 mg Q6H PRN   albuterol 2 puff Q6H PRN       Admission Time  I spent 30 minutes admitting the patient  This involved direct patient contact where I performed a full history and physical, reviewing previous records, and reviewing laboratory and other diagnostic studies  Portions of the record may have been created with voice recognition software  Occasional wrong word or "sound a like" substitutions may have occurred due to the inherent limitations of voice recognition software    Read the chart carefully and recognize, using context, where substitutions have occurred     ==  Arsh Yang, [address] [facility]  Internal Medicine Residency PGY-2

## 2019-10-13 NOTE — PROGRESS NOTES
INTERNAL MEDICINE RESIDENCY PROGRESS NOTE     Name: Gustavo Singh   Age & Sex: 46 y o  male   MRN: [id number]  Unit/Bed#: [facility] 731-01   Encounter: [id number]  Team: SOD Team B     PATIENT INFORMATION     Name: Gustavo Singh   Age & Sex: 46 y o  male   MRN: [id number]  Hospital Stay Days: 0    ASSESSMENT/PLAN     Principal Problem:    Alcohol withdrawal ([facility] )  Active Problems:    Nicotine dependence    Chronic obstructive pulmonary disease with acute exacerbation ([facility] )      Chronic obstructive pulmonary disease with acute exacerbation (HCC)  Assessment & Plan  Appears stable  Albuterol p r n  Nicotine dependence  Assessment & Plan  Daily nicotine patch  Tobacco cessation advised    * Alcohol withdrawal ([facility] )  Assessment & Plan  Patient presented with tremors, palpitations, nausea, agitation  Patient's last drink was around 9:00 a m  This morning  Patient received 1 dose of IV Valium 5 mg in the ED  On my assessment patient remains tremulous and anxious  Patient's tachycardia and blood pressure have improved  Plan:  -continue to monitor for further withdrawal symptoms  CIWA protocol  Will give 1 time dose of Ativan at this time  Will place on Sirax scheduled until alcohol withdrawal symptoms improved  -can give additional doses of benzodiazepines if patient remains persistently agitated  -continue to monitor electrolytes and replete as needed  -monitor on telemetry  -continue follow-up with case management and host services      Disposition:  Continue inpatient until alcohol withdrawal symptoms improved    SUBJECTIVE     Patient seen and examined  No acute events overnight  Complains of nausea, diaphoresis and bilateral hand tremors  Otherwise no acute complaints      OBJECTIVE     Vitals:    10/13/19 0000 10/13/19 0040 10/13/19 0512 10/13/19 0712   BP: 144/88 144/88 146/95 (!) 146/110   Pulse:   76 81   Resp:   18 20   Temp:    98 5 °F (36 9 °C)   TempSrc:       SpO2:   96% 96%      Temperature: Temp (24hrs), Av 2 °F (36 8 °C), Min:97 1 °F (36 2 °C), Max:98 6 °F (37 °C)    Temperature: 98 5 °F (36 9 °C)  Intake & Output:  I/O       10/11 07 - 10/12 0700 10/12 0701 - 10/13 0700 10/13 0701 - 10/14 0700    P  O    120    IV Piggyback  50     Total Intake  50 120    Net  +50 +120           Unmeasured Urine Occurrence   2 x        Weights: There is no height or weight on file to calculate BMI  Weight (last 2 days)     None        Physical Exam   Constitutional: He is oriented to person, place, and time  He appears well-developed and well-nourished  No distress  HENT:   Head: Normocephalic and atraumatic  Nose: Nose normal    Mouth/Throat: No oropharyngeal exudate  Eyes: Conjunctivae are normal  No scleral icterus  Neck: Normal range of motion  Neck supple  Cardiovascular: Normal rate, regular rhythm, normal heart sounds and intact distal pulses  Exam reveals no gallop and no friction rub  No murmur heard  Pulmonary/Chest: Effort normal and breath sounds normal  No respiratory distress  He has no wheezes  He has no rales  He exhibits no tenderness  Abdominal: Soft  Bowel sounds are normal  He exhibits no distension  There is no tenderness  There is no guarding  Musculoskeletal: Normal range of motion  He exhibits no edema, tenderness or deformity  Neurological: He is alert and oriented to person, place, and time  Bilateral hand tremors   Skin: Skin is warm and dry  He is not diaphoretic  No erythema  Psychiatric: He has a normal mood and affect  LABORATORY DATA     Labs: I have personally reviewed pertinent reports    Results from last 7 days   Lab Units 10/13/19  0510 10/12/19  1420   WBC Thousand/uL 6 49 8 83   HEMOGLOBIN g/dL 14 6 13 9   HEMATOCRIT % 44 9 41 6   PLATELETS Thousands/uL 320 310   NEUTROS PCT % 61 72   MONOS PCT % 10 8      Results from last 7 days   Lab Units 10/13/19  0510 10/12/19  1420   POTASSIUM mmol/L 4 0 3 1*   CHLORIDE mmol/L 112* 107   CO2 mmol/L 27 24   BUN mg/dL 10 10   CREATININE mg/dL 0 64 0 67   CALCIUM mg/dL 8 6 9 0   ALK PHOS U/L  --  91   ALT U/L  --  50   AST U/L  --  123*                            IMAGING & DIAGNOSTIC TESTING     Radiology Results: I have personally reviewed pertinent reports  No results found  Other Diagnostic Testing: I have personally reviewed pertinent reports  ACTIVE MEDICATIONS     Current Facility-Administered Medications   Medication Dose Route Frequency    acetaminophen (TYLENOL) tablet 650 mg  650 mg Oral Q6H PRN    albuterol (PROVENTIL HFA,VENTOLIN HFA) inhaler 2 puff  2 puff Inhalation P8Q PRN    folic acid (FOLVITE) tablet 1 mg  1 mg Oral Daily    nicotine (NICODERM CQ) 21 mg/24 hr TD 24 hr patch 1 patch  1 patch Transdermal Daily    oxazepam (SERAX) capsule 15 mg  15 mg Oral Q8H [address]       VTE Pharmacologic Prophylaxis: Sequential compression device (Venodyne)   VTE Mechanical Prophylaxis: sequential compression device    Portions of the record may have been created with voice recognition software  Occasional wrong word or "sound a like" substitutions may have occurred due to the inherent limitations of voice recognition software    Read the chart carefully and recognize, using context, where substitutions have occurred   ==  Kenia Majano MD  [address]  Internal Medicine Residency PGY-3

## 2019-10-13 NOTE — ED NOTES
Phone call was placed to [facility] [phone] as Dr Suze Olivas noted patient is cleared for detox  Spoke with Mackenzie Quispe and she noted she would start bed search and keep me updated

## 2019-10-31 ENCOUNTER — HOSPITAL ENCOUNTER (INPATIENT)
Facility: HOSPITAL | Age: 54
LOS: 5 days | Discharge: HOME | End: 2019-11-07
Attending: EMERGENCY MEDICINE | Admitting: INTERNAL MEDICINE
Payer: COMMERCIAL

## 2019-10-31 DIAGNOSIS — S81.809A WOUNDS, MULTIPLE OPEN, LOWER EXTREMITY, UNSPECIFIED LATERALITY, INITIAL ENCOUNTER: ICD-10-CM

## 2019-10-31 DIAGNOSIS — F10.10 CHRONIC ALCOHOL ABUSE: ICD-10-CM

## 2019-10-31 DIAGNOSIS — F10.230 ALCOHOL DEPENDENCE WITH UNCOMPLICATED WITHDRAWAL (HCC): ICD-10-CM

## 2019-10-31 DIAGNOSIS — F10.929 ALCOHOL INTOXICATION (HCC): Primary | ICD-10-CM

## 2019-10-31 DIAGNOSIS — F17.200 NICOTINE DEPENDENCE: Chronic | ICD-10-CM

## 2019-10-31 PROBLEM — S81.801A MULTIPLE OPEN WOUNDS OF RIGHT LOWER EXTREMITY: Status: ACTIVE | Noted: 2019-10-31

## 2019-10-31 LAB
ALBUMIN SERPL BCP-MCNC: 3.7 G/DL (ref 3.5–5)
ALP SERPL-CCNC: 91 U/L (ref 46–116)
ALT SERPL W P-5'-P-CCNC: 70 U/L (ref 12–78)
AMPHETAMINES SERPL QL SCN: NEGATIVE
ANION GAP SERPL CALCULATED.3IONS-SCNC: 10 MMOL/L (ref 4–13)
AST SERPL W P-5'-P-CCNC: 184 U/L (ref 5–45)
BARBITURATES UR QL: NEGATIVE
BASOPHILS # BLD AUTO: 0.06 THOUSANDS/ΜL (ref 0–0.1)
BASOPHILS NFR BLD AUTO: 1 % (ref 0–1)
BENZODIAZ UR QL: NEGATIVE
BILIRUB SERPL-MCNC: 0.25 MG/DL (ref 0.2–1)
BUN SERPL-MCNC: 6 MG/DL (ref 5–25)
CALCIUM SERPL-MCNC: 8.1 MG/DL (ref 8.3–10.1)
CHLORIDE SERPL-SCNC: 115 MMOL/L (ref 100–108)
CO2 SERPL-SCNC: 25 MMOL/L (ref 21–32)
COCAINE UR QL: NEGATIVE
CREAT SERPL-MCNC: 0.78 MG/DL (ref 0.6–1.3)
EOSINOPHIL # BLD AUTO: 0.11 THOUSAND/ΜL (ref 0–0.61)
EOSINOPHIL NFR BLD AUTO: 2 % (ref 0–6)
ERYTHROCYTE [DISTWIDTH] IN BLOOD BY AUTOMATED COUNT: 15.6 % (ref 11.6–15.1)
ETHANOL EXG-MCNC: 0.29 MG/DL
ETHANOL SERPL-MCNC: 264 MG/DL (ref 0–3)
GFR SERPL CREATININE-BSD FRML MDRD: 102 ML/MIN/1.73SQ M
GLUCOSE SERPL-MCNC: 152 MG/DL (ref 65–140)
HCT VFR BLD AUTO: 41.2 % (ref 36.5–49.3)
HGB BLD-MCNC: 13.9 G/DL (ref 12–17)
IMM GRANULOCYTES # BLD AUTO: 0.01 THOUSAND/UL (ref 0–0.2)
IMM GRANULOCYTES NFR BLD AUTO: 0 % (ref 0–2)
LYMPHOCYTES # BLD AUTO: 1.73 THOUSANDS/ΜL (ref 0.6–4.47)
LYMPHOCYTES NFR BLD AUTO: 38 % (ref 14–44)
MCH RBC QN AUTO: 33.5 PG (ref 26.8–34.3)
MCHC RBC AUTO-ENTMCNC: 33.7 G/DL (ref 31.4–37.4)
MCV RBC AUTO: 99 FL (ref 82–98)
METHADONE UR QL: NEGATIVE
MONOCYTES # BLD AUTO: 0.31 THOUSAND/ΜL (ref 0.17–1.22)
MONOCYTES NFR BLD AUTO: 7 % (ref 4–12)
NEUTROPHILS # BLD AUTO: 2.38 THOUSANDS/ΜL (ref 1.85–7.62)
NEUTS SEG NFR BLD AUTO: 52 % (ref 43–75)
NRBC BLD AUTO-RTO: 0 /100 WBCS
OPIATES UR QL SCN: NEGATIVE
PCP UR QL: NEGATIVE
PLATELET # BLD AUTO: 241 THOUSANDS/UL (ref 149–390)
PMV BLD AUTO: 8.5 FL (ref 8.9–12.7)
POTASSIUM SERPL-SCNC: 3.3 MMOL/L (ref 3.5–5.3)
PROT SERPL-MCNC: 6.1 G/DL (ref 6.4–8.2)
RBC # BLD AUTO: 4.15 MILLION/UL (ref 3.88–5.62)
SODIUM SERPL-SCNC: 150 MMOL/L (ref 136–145)
THC UR QL: NEGATIVE
WBC # BLD AUTO: 4.6 THOUSAND/UL (ref 4.31–10.16)

## 2019-10-31 PROCEDURE — 80053 COMPREHEN METABOLIC PANEL: CPT | Performed by: INTERNAL MEDICINE

## 2019-10-31 PROCEDURE — 99285 EMERGENCY DEPT VISIT HI MDM: CPT

## 2019-10-31 PROCEDURE — 36415 COLL VENOUS BLD VENIPUNCTURE: CPT | Performed by: INTERNAL MEDICINE

## 2019-10-31 PROCEDURE — 82075 ASSAY OF BREATH ETHANOL: CPT | Performed by: EMERGENCY MEDICINE

## 2019-10-31 PROCEDURE — 80320 DRUG SCREEN QUANTALCOHOLS: CPT | Performed by: INTERNAL MEDICINE

## 2019-10-31 PROCEDURE — 85025 COMPLETE CBC W/AUTO DIFF WBC: CPT | Performed by: INTERNAL MEDICINE

## 2019-10-31 PROCEDURE — NC001 PR NO CHARGE: Performed by: PSYCHIATRY & NEUROLOGY

## 2019-10-31 PROCEDURE — 99285 EMERGENCY DEPT VISIT HI MDM: CPT | Performed by: EMERGENCY MEDICINE

## 2019-10-31 PROCEDURE — 80307 DRUG TEST PRSMV CHEM ANLYZR: CPT | Performed by: EMERGENCY MEDICINE

## 2019-10-31 RX ORDER — NICOTINE 21 MG/24HR
1 PATCH, TRANSDERMAL 24 HOURS TRANSDERMAL DAILY
Status: DISCONTINUED | OUTPATIENT
Start: 2019-10-31 | End: 2019-11-07 | Stop reason: HOSPADM

## 2019-10-31 RX ORDER — ZIPRASIDONE MESYLATE 20 MG/ML
20 INJECTION, POWDER, LYOPHILIZED, FOR SOLUTION INTRAMUSCULAR ONCE
Status: COMPLETED | OUTPATIENT
Start: 2019-10-31 | End: 2019-10-31

## 2019-10-31 RX ORDER — LORAZEPAM 2 MG/ML
2 INJECTION INTRAMUSCULAR ONCE
Status: COMPLETED | OUTPATIENT
Start: 2019-10-31 | End: 2019-10-31

## 2019-10-31 RX ORDER — NICOTINE 21 MG/24HR
21 PATCH, TRANSDERMAL 24 HOURS TRANSDERMAL ONCE
Status: DISCONTINUED | OUTPATIENT
Start: 2019-10-31 | End: 2019-10-31

## 2019-10-31 RX ORDER — SODIUM CHLORIDE 9 MG/ML
75 INJECTION, SOLUTION INTRAVENOUS CONTINUOUS
Status: DISCONTINUED | OUTPATIENT
Start: 2019-10-31 | End: 2019-11-01

## 2019-10-31 RX ORDER — ALBUTEROL SULFATE 90 UG/1
2 AEROSOL, METERED RESPIRATORY (INHALATION) EVERY 6 HOURS PRN
Status: DISCONTINUED | OUTPATIENT
Start: 2019-10-31 | End: 2019-11-07 | Stop reason: HOSPADM

## 2019-10-31 RX ORDER — OLANZAPINE 10 MG/1
10 TABLET, ORALLY DISINTEGRATING ORAL ONCE
Status: COMPLETED | OUTPATIENT
Start: 2019-10-31 | End: 2019-10-31

## 2019-10-31 RX ADMIN — NICOTINE 1 PATCH: 21 PATCH, EXTENDED RELEASE TRANSDERMAL at 16:38

## 2019-10-31 RX ADMIN — SODIUM CHLORIDE 75 ML/HR: 0.9 INJECTION, SOLUTION INTRAVENOUS at 17:31

## 2019-10-31 RX ADMIN — THIAMINE HYDROCHLORIDE: 100 INJECTION, SOLUTION INTRAMUSCULAR; INTRAVENOUS at 16:39

## 2019-10-31 RX ADMIN — OLANZAPINE 10 MG: 10 TABLET, ORALLY DISINTEGRATING ORAL at 12:17

## 2019-10-31 RX ADMIN — LORAZEPAM 2 MG: 2 INJECTION INTRAMUSCULAR; INTRAVENOUS at 14:48

## 2019-10-31 RX ADMIN — ZIPRASIDONE MESYLATE 20 MG: 20 INJECTION, POWDER, LYOPHILIZED, FOR SOLUTION INTRAMUSCULAR at 14:48

## 2019-10-31 NOTE — ED PROVIDER NOTES
Emergency Department Note- Beverly Link 53 y o  male MRN: [id number]    Unit/Bed#: [facility] Encounter: [id number]        History of Present Illness   HPI:  Beverly Link is a 53 y o  male who presents with acute alcohol intoxication  Patient states he is a chronic alcoholic that drinks daily and has moved to the area from [place] to years ago  Patient today was seen stumbling around in brought in by police and EMS  Patient with no signs of trauma and denies any trauma  Patient does state that he wants help to quit drinking  Patient denies any current drug use  Patient feels depressed but is not suicidal or homicidal and has no hallucinations or delusions  Patient does smoke and in the department is adamant that he wants to go outside to smoke  Patient with no chest pain or shortness of breath no abdominal pain no nausea vomiting diarrhea no headaches no blurry vision or double vision  REVIEW OF SYSTEMS    Constitutional: Negative for chills, fatigue and fever  HENT: Negative for ear pain, sore throat and trouble swallowing  Eyes: Negative for photophobia, pain and visual disturbance  Respiratory: Negative for cough, chest tightness and shortness of breath  Cardiovascular: Negative for chest pain and palpitations  Gastrointestinal: Negative for abdominal pain, constipation, diarrhea, nausea and vomiting  Genitourinary: Negative for dysuria, flank pain, frequency and hematuria  Musculoskeletal: Negative for back pain and neck pain  Skin: Negative for color change and rash  Neurological: Negative for dizziness, weakness, light-headedness and headaches  Psychiatric/Behavioral: Negative for confusion  The patient is not nervous/anxious  Patient is intoxicated    All systems reviewed and negative except as noted above or in HPI         Historical Information   Past Medical History:   Diagnosis Date    COPD (chronic obstructive pulmonary disease) ([facility] )      History reviewed   No pertinent surgical history  Social History   Social History     Substance and Sexual Activity   Alcohol Use Yes    Comment: every day     Social History     Substance and Sexual Activity   Drug Use No     Social History     Tobacco Use   Smoking Status Current Every Day Smoker    Packs/day: 1 00    Years: 40 00    Pack years: 40 00    Types: Cigarettes   Smokeless Tobacco Never Used     Family History: History reviewed  No pertinent family history  Meds/Allergies     (Not in a hospital admission)  No Known Allergies    Objective   Vitals: Blood pressure (!) 156/106, pulse 95, temperature 98 °F (36 7 °C), temperature source Oral, resp  rate 18, SpO2 95 %  PHYSICAL EXAM     General Appearance: alert and oriented, nad, non toxic appearing appears intoxicated  Skin:  Warm, dry, intact  HEENT: atraumatic, normocephalic, eomi, perll   Neck: Supple, no JVD, no lymphadenopathy, trachea midline, no bruit  Cardiac: rrr, no murmurs, rub, gallops  Pulmonary: lungs cta, no wheezes, rales, rhonchi  Gastrointestinal: abdomen soft nontender, good bs, no mass or bruits, no cva tenderness  Extremities: no pedal edema, good pulses, no calf tenderness, no clubbing, no cyanosis  Neuro:  no focal motor or sensory deficits, cn intact  Psych:  Normal mood and affect, normal judgement and insight      Lab Results: Lab Results: I have personally reviewed pertinent lab results  Assessment/Plan     ED Medical Decision Making:  Patient's breathalyzer was 287  I explained to the patient that we can have post see him and see if there is any availability for rehab  Patient is feeling like he wants to leave so that he can smoke and I have offered him a nicotine patch  Patient is also calling his sister to see if she will come pick him up so that he can be discharged  I explained to the patient that if she is willing to take him home he is free to go however if not he will have to wait in the department until he is sober    I have also offered for him to wait here for host to see him  Portions of the record may have been created with voice recognition software  Occasional wrong word or "sound a like" substitutions may have occurred due to the inherent limitations of voice recognition software  Read the chart carefully and recognize, using context, where substitutions have occurred         Gary Chambers MD  10/31/19 2104

## 2019-10-31 NOTE — H&P
INTERNAL MEDICINE HISTORY AND PHYSICAL  ED 11 SOD Team A    NAME: Joan Lee  AGE: 53 y o  SEX: male  : 1965   MRN: [id number]  ENCOUNTER: [id number]    DATE: 10/31/2019  TIME: 2:56 PM    Primary Care Physician: No primary care provider on file  Admitting Provider: Jase Dunn MD    Chief complaint: ETOH intox    History of Present Illness     Khai Michel is a 53 y o  male medical history of COPD, tobacco abuse, alcohol abuse  Per ED note patient was seen today stumbling around outside drunk and brought in by police  Patient told the ED attending that he had no acute complaints  He was extremely uncooperative with me, repeatedly demanding to go home  At multiple points during my visit patient got out of bed and walked out of the room stating he was going to go home  Patient was very redirectable and able to be returned to bed without issue  Patient not at all cooperative with history taking  Refused to tell me how much alcohol he was drinking on a daily basis  Does report that he wants to quit alcohol use  Denies history of alcohol withdrawal seizure  Does report history of withdrawal symptoms in the past   States that he has had a recent rehab stay 7 months ago  Patient denies SI, HI, AH, VH  Per discussion with patient's family patient has been declared incompetent by the state, sister has been named his guardian  Sister reports that patient has drunken 200 dollars worth of cooking gordy over the past week  They report that he lives with a man that he met while in rehab  States that patient often times walk barefoot for several miles and they have noted ulcerations/wounds on his lower extremities  In the ED patient's vital signs within normal limits  Alcohol breath test 0 287  UDS negative  CBC/CMP pending  Per ED attending crisis  has seen and evaluated the patient    They are unable to 302 the patient at this time, but have significant concerns and request psychiatric evaluation be ordered  Patient received Ativan and Geodon at the end of my evaluation secondary to increasing agitation  Review of Systems   Review of Systems   Unable to perform ROS: Other   Patient denies any acute complaints at this time, though is noncooperative when asked specific questions  Past Medical History     Past Medical History:   Diagnosis Date    COPD (chronic obstructive pulmonary disease) ([facility] )        Past Surgical History   History reviewed  No pertinent surgical history  Social History     Social History     Substance and Sexual Activity   Alcohol Use Yes    Comment: every day     Social History     Substance and Sexual Activity   Drug Use No     Social History     Tobacco Use   Smoking Status Current Every Day Smoker    Packs/day: 1 00    Years: 40 00    Pack years: 40 00    Types: Cigarettes   Smokeless Tobacco Never Used       Family History   History reviewed  No pertinent family history  Medications Prior to Admission     Prior to Admission medications    Medication Sig Start Date End Date Taking? Authorizing Provider   acetaminophen (TYLENOL) 325 mg tablet Take 650 mg by mouth every 6 (six) hours as needed for mild pain or headaches    Historical Provider, MD   albuterol (VENTOLIN HFA) 90 mcg/act inhaler Inhale 2 puffs every 6 (six) hours as needed for wheezing 1/27/19   Sosa Restrepo, DO       Allergies   No Known Allergies    Objective     Vitals:    10/31/19 1108 10/31/19 1421   BP: (!) 156/106 121/59   BP Location: Left arm Left arm   Pulse: 95 79   Resp: 18 18   Temp: 98 °F (36 7 °C)    TempSrc: Oral    SpO2: 95% 95%     There is no height or weight on file to calculate BMI  No intake or output data in the 24 hours ending 10/31/19 1456  Invasive Devices     None                 Physical Exam:  Limited secondary to patient noncompliance  Patient talking loudly through all auscultation, unable to redirect  GENERAL: Appears well-developed and well-nourished  Appears in no acute distress   HEENT: Normocephalic and atraumatic  No scleral icterus  PERRLA  EOMI B/L  No oropharyngeal edema  MM moist    NECK: Neck supple with no lymphadenopathy  Trachea midline  No JVD  CARDIOVASCULAR: S1 and S2 are present  Regular rate and rhythm  No murmurs, rubs, or gallops  RESPIRATORY: CTA B/L, no rales, rhonci or wheezes  Normal respiratory expansion  ABDOMINAL: Bowel sounds present in all 4 quadrants, non-tender, soft, non-distended  No organomegaly, rebound, or guarding  EXTREMITIES: 2+ DP and PT pulses bilaterally; no cyanosis, clubbing, edema  ROM intact  HERBERT x4   MUSCULOSKELETAL: No joint tenderness, deformity or swelling, full range of motion without pain  NEUROLOGIC: Patient is alert, he is non cooperative when asked where he is/the date/etc  No sensory or motor deficits  CN 2-12 intact  Plantars downgoing bilaterally  Speech fluent  SKIN: Skin is warm and dry  No skin lesions are present  No rashes  PSYCHIATRIC: Normal mood and affect     Lab Results: I have personally reviewed pertinent reports  Urinalysis:       Invalid input(s): URIBILINOGEN     Urine Micro:        EKG, Pathology, and Other Studies: I have personally reviewed pertinent reports        Medications given in Emergency Department     Medication Administration - last 24 hours from 10/30/2019 1456 to 10/31/2019 1456       Date/Time Order Dose Route Action Action by     10/31/2019 1139 nicotine (NICODERM CQ) 21 mg/24 hr TD 24 hr patch 21 mg 21 mg Transdermal Not Given Pamela Oswald, BRYAN     10/31/2019 1217 OLANZapine (ZyPREXA ZYDIS) dispersible tablet 10 mg 10 mg Oral Given Pamela Oswald RN     10/31/2019 1448 LORazepam (ATIVAN) 2 mg/mL injection 2 mg 2 mg Intramuscular Given Pamela Oswald RN     10/31/2019 1448 ziprasidone (GEODON) IM injection 20 mg 20 mg Intramuscular Given Pamela Oswald RN          Assessment and Plan     Patient Active Problem List   Diagnosis    Nicotine dependence    Chronic obstructive pulmonary disease with acute exacerbation (HCC)    Acute respiratory failure with hypoxia (HCC)    [facility] dependence in remission ([facility] )    [facility] )    Alcohol intoxication ([facility] )     Alcohol dependence:  Patient does not divulge how much alcohol he drinks on a daily basis, however it seems significant  Does report a history of alcohol withdrawal symptoms in the past   Denies history of seizures  Does report that he would like to quit  With history of alcohol rehab attempted 7 months ago  -[facility] protocol  -IV thiamine folate  -NPO for now  -will consult Psychiatry for further evaluation at the request of crisis worker  COPD:  Secondary to tobacco abuse  Patient on inhaler at home  Stable at this time   -continue p r n  Albuterol  Tobacco abuse:  Counseled for cessation  -nicotine patch    Lower extremity wounds:  Do not look infected at this time  Secondary to walking barefoot for long distances outside per family  -wound care consult    Code Status: Level 1 - Full Code  VTE Pharmacologic Prophylaxis: Sequential compression device (Venodyne)    VTE Mechanical Prophylaxis: sequential compression device  Admission Status: OBSERVATION    Admission Time  I spent 30 minutes admitting the patient  This involved direct patient contact where I performed a full history and physical, reviewing previous records, and reviewing laboratory and other diagnostic studies      Sneha España MD  Internal Medicine  PGY-2

## 2019-10-31 NOTE — ED NOTES
Pt wandering олег yelling "give me my fucking clothes! i'm going home, I'm not fucking staying here " pt refusing bloodwork/IV at this time  Dr Isabelle Li to order IM meds       Renate Huerta RN  10/31/19 3953

## 2019-10-31 NOTE — PROGRESS NOTES
Unable to assess the patient, patient is intoxicated and at this moment I cannot evaluate the patient     I will try tomorrow    Latha Salazar MD

## 2019-10-31 NOTE — ED NOTES
Pt sleeping comfortably on stretcher, will continue to monitor       Ever BRYAN Oakes  10/31/19 0261

## 2019-10-31 NOTE — ED NOTES
Pt given supplies to change in bathroom after soiling himself  Upon entering bathroom, pt found to be smoking a cigarette  Explained to pt he cannot smoke in the hospital  Security at the bedside to confiscate cigarettes  Pt refusing to give security his lighter or matches  Pt states if he cannot smoke cigarettes while in the ED he wants to leave AMA  Dr Corie Blanchard at the bedside to speak with pt again  Pt handed 2 lighters and cigarettes to security at this time  Per Dr Corie Blanchard pt able to leave if he has a sober ride home  Pt provided with phone  Pt needs to be redirected multiple times during conversation        Dannielle Hamilton RN  10/31/19 2424

## 2019-10-31 NOTE — ED NOTES
Spoke to pts brother in law who states pt has been found not competent and his sister is his legal guardian  Sister wants pt to be held until her arrival even if restraints are required to hold him   Sister wants to discuss options for placement upon arrival       Bela Montgomery RN  10/31/19 4630

## 2019-10-31 NOTE — PROGRESS NOTES
Pt arrived from ED, sedated r/t meds given in ED, pt unable to answer questions appropriately and not allowing full skin assessment to be done at this time  Will attempt when pt more awake, alert and cooperative

## 2019-10-31 NOTE — PLAN OF CARE
Problem: Prexisting or High Potential for Compromised Skin Integrity  Goal: Skin integrity is maintained or improved  Description  INTERVENTIONS:  - Identify patients at risk for skin breakdown  - Assess and monitor skin integrity  - Assess and monitor nutrition and hydration status  - Monitor labs   - Assess for incontinence   - Turn and reposition patient  - Assist with mobility/ambulation  - Relieve pressure over bony prominences  - Avoid friction and shearing  - Provide appropriate hygiene as needed including keeping skin clean and dry  - Evaluate need for skin moisturizer/barrier cream  - Collaborate with interdisciplinary team   - Patient/family teaching  - Consider wound care consult   Outcome: Not Progressing     Problem: PAIN - ADULT  Goal: Verbalizes/displays adequate comfort level or baseline comfort level  Description  Interventions:  - Encourage patient to monitor pain and request assistance  - Assess pain using appropriate pain scale  - Administer analgesics based on type and severity of pain and evaluate response  - Implement non-pharmacological measures as appropriate and evaluate response  - Consider cultural and social influences on pain and pain management  - Notify physician/advanced practitioner if interventions unsuccessful or patient reports new pain  Outcome: Not Progressing     Problem: SAFETY ADULT  Goal: Patient will remain free of falls  Description  INTERVENTIONS:  - Assess patient frequently for physical needs  -  Identify cognitive and physical deficits and behaviors that affect risk of falls    -  [place] fall precautions as indicated by assessment   - Educate patient/family on patient safety including physical limitations  - Instruct patient to call for assistance with activity based on assessment  - Modify environment to reduce risk of injury  - Consider OT/PT consult to assist with strengthening/mobility  Outcome: Not Progressing  Goal: Maintain or return to baseline ADL function  Description  INTERVENTIONS:  -  Assess patient's ability to carry out ADLs; assess patient's baseline for ADL function and identify physical deficits which impact ability to perform ADLs (bathing, care of mouth/teeth, toileting, grooming, dressing, etc )  - Assess/evaluate cause of self-care deficits   - Assess range of motion  - Assess patient's mobility; develop plan if impaired  - Assess patient's need for assistive devices and provide as appropriate  - Encourage maximum independence but intervene and supervise when necessary  - Involve family in performance of ADLs  - Assess for home care needs following discharge   - Consider OT consult to assist with ADL evaluation and planning for discharge  - Provide patient education as appropriate  Outcome: Not Progressing  Goal: Maintain or return mobility status to optimal level  Description  INTERVENTIONS:  - Assess patient's baseline mobility status (ambulation, transfers, stairs, etc )    - Identify cognitive and physical deficits and behaviors that affect mobility  - Identify mobility aids required to assist with transfers and/or ambulation (gait belt, sit-to-stand, lift, walker, cane, etc )  - [place] fall precautions as indicated by assessment  - Record patient progress and toleration of activity level on Mobility SBAR; progress patient to next Phase/Stage  - Instruct patient to call for assistance with activity based on assessment  - Consider rehabilitation consult to assist with strengthening/weightbearing, etc   Outcome: Not Progressing     Problem: DISCHARGE PLANNING  Goal: Discharge to home or other facility with appropriate resources  Description  INTERVENTIONS:  - Identify barriers to discharge w/patient and caregiver  - Arrange for needed discharge resources and transportation as appropriate  - Identify discharge learning needs (meds, wound care, etc )  - Arrange for interpretive services to assist at discharge as needed  - Refer to Case Management Department for coordinating discharge planning if the patient needs post-hospital services based on physician/advanced practitioner order or complex needs related to functional status, cognitive ability, or social support system  Outcome: Not Progressing     Problem: Knowledge Deficit  Goal: Patient/family/caregiver demonstrates understanding of disease process, treatment plan, medications, and discharge instructions  Description  Complete learning assessment and assess knowledge base    Interventions:  - Provide teaching at level of understanding  - Provide teaching via preferred learning methods  Outcome: Not Progressing

## 2019-10-31 NOTE — ED NOTES
Pt refusing to sit in bed in hallway, pt states he needs to smoke a cigarette  Pt refusing nicotine patch at this time  Security at the bedside, restraints applied to stretcher  Explained to pt he needs to remain in room or we will restrain him to the bed  Pt needs continuous redirection  Pt eventually changed into paper scrubs at this time and is laying on stretcher  Pt provided with food and belongings placed in zone 5 cabinet  Pt calm and cooperative at this time       Dannielle Hamilton RN  10/31/19 1212

## 2019-11-01 PROBLEM — E87.0 HYPERNATREMIA: Status: ACTIVE | Noted: 2019-11-01

## 2019-11-01 PROBLEM — E87.6 HYPOKALEMIA: Status: ACTIVE | Noted: 2019-11-01

## 2019-11-01 LAB
ANION GAP SERPL CALCULATED.3IONS-SCNC: 6 MMOL/L (ref 4–13)
BUN SERPL-MCNC: 11 MG/DL (ref 5–25)
CALCIUM SERPL-MCNC: 8.6 MG/DL (ref 8.3–10.1)
CHLORIDE SERPL-SCNC: 107 MMOL/L (ref 100–108)
CO2 SERPL-SCNC: 27 MMOL/L (ref 21–32)
CREAT SERPL-MCNC: 0.7 MG/DL (ref 0.6–1.3)
GFR SERPL CREATININE-BSD FRML MDRD: 106 ML/MIN/1.73SQ M
GLUCOSE SERPL-MCNC: 124 MG/DL (ref 65–140)
POTASSIUM SERPL-SCNC: 3.7 MMOL/L (ref 3.5–5.3)
SODIUM SERPL-SCNC: 140 MMOL/L (ref 136–145)

## 2019-11-01 PROCEDURE — 99220 PR INITIAL OBSERVATION CARE/DAY 70 MINUTES: CPT | Performed by: INTERNAL MEDICINE

## 2019-11-01 PROCEDURE — 99203 OFFICE O/P NEW LOW 30 MIN: CPT | Performed by: PODIATRIST

## 2019-11-01 PROCEDURE — 99203 OFFICE O/P NEW LOW 30 MIN: CPT | Performed by: PSYCHIATRY & NEUROLOGY

## 2019-11-01 PROCEDURE — 80048 BASIC METABOLIC PNL TOTAL CA: CPT | Performed by: STUDENT IN AN ORGANIZED HEALTH CARE EDUCATION/TRAINING PROGRAM

## 2019-11-01 PROCEDURE — NC001 PR NO CHARGE: Performed by: INTERNAL MEDICINE

## 2019-11-01 RX ORDER — LORAZEPAM 2 MG/ML
2 INJECTION INTRAMUSCULAR ONCE
Status: COMPLETED | OUTPATIENT
Start: 2019-11-01 | End: 2019-11-01

## 2019-11-01 RX ORDER — ACETAMINOPHEN 325 MG/1
975 TABLET ORAL EVERY 6 HOURS PRN
Status: DISCONTINUED | OUTPATIENT
Start: 2019-11-01 | End: 2019-11-07 | Stop reason: HOSPADM

## 2019-11-01 RX ORDER — DEXTROSE MONOHYDRATE 50 MG/ML
150 INJECTION, SOLUTION INTRAVENOUS CONTINUOUS
Status: DISCONTINUED | OUTPATIENT
Start: 2019-11-01 | End: 2019-11-01

## 2019-11-01 RX ORDER — LORAZEPAM 1 MG/1
2 TABLET ORAL ONCE
Status: COMPLETED | OUTPATIENT
Start: 2019-11-01 | End: 2019-11-01

## 2019-11-01 RX ORDER — POTASSIUM CHLORIDE 20 MEQ/1
40 TABLET, EXTENDED RELEASE ORAL 2 TIMES DAILY
Status: COMPLETED | OUTPATIENT
Start: 2019-11-01 | End: 2019-11-01

## 2019-11-01 RX ADMIN — ALBUTEROL SULFATE 2 PUFF: 90 AEROSOL, METERED RESPIRATORY (INHALATION) at 17:47

## 2019-11-01 RX ADMIN — POTASSIUM CHLORIDE 40 MEQ: 1500 TABLET, EXTENDED RELEASE ORAL at 17:44

## 2019-11-01 RX ADMIN — LORAZEPAM 2 MG: 2 INJECTION INTRAMUSCULAR; INTRAVENOUS at 04:18

## 2019-11-01 RX ADMIN — ACETAMINOPHEN 975 MG: 325 TABLET ORAL at 17:44

## 2019-11-01 RX ADMIN — ALBUTEROL SULFATE 2 PUFF: 90 AEROSOL, METERED RESPIRATORY (INHALATION) at 08:36

## 2019-11-01 RX ADMIN — THIAMINE HYDROCHLORIDE: 100 INJECTION, SOLUTION INTRAMUSCULAR; INTRAVENOUS at 10:03

## 2019-11-01 RX ADMIN — DEXTROSE 150 ML/HR: 5 SOLUTION INTRAVENOUS at 08:32

## 2019-11-01 RX ADMIN — NICOTINE 1 PATCH: 21 PATCH, EXTENDED RELEASE TRANSDERMAL at 08:25

## 2019-11-01 RX ADMIN — POTASSIUM CHLORIDE 40 MEQ: 1500 TABLET, EXTENDED RELEASE ORAL at 08:24

## 2019-11-01 RX ADMIN — LORAZEPAM 2 MG: 1 TABLET ORAL at 13:18

## 2019-11-01 RX ADMIN — SODIUM CHLORIDE 75 ML/HR: 0.9 INJECTION, SOLUTION INTRAVENOUS at 04:18

## 2019-11-01 RX ADMIN — DEXTROSE 150 ML/HR: 5 SOLUTION INTRAVENOUS at 16:18

## 2019-11-01 NOTE — CONSULTS
Podiatry Consultation - Mike Leonh     Patient Information: Gustavo Singh 53 y o  male MRN: [id number]  Unit/Bed#: [facility] 806-01 Encounter: [id number]  PCP: No primary care provider on file  Date of Admission:  10/31/2019  Date of Consultation: 11/01/19  Requesting Physician: Radha Agudelo MD    Reason For Consultation:   Left dorsal foot blister    Assessment:     Podiatric assessment:   Superficial erosion of the left dorsal foot with no signs of infection      Principal Problem:    Alcohol intoxication ([place] [address] )  Active Problems:    Nicotine dependence    Chronic obstructive pulmonary disease with acute exacerbation (HCC)    Alcohol withdrawal ([place] [address] )    Multiple open wounds of right lower extremity    Hypernatremia    Hypokalemia          Plan:    · Local wound care with betadine and DSD changed daily  · WBAT   · No antibiotic therapy required at this time  ·  will confirm this plan with my attending        History of Present Illness:    Gustavo Singh is a 53 y o  male who is originally admitted 10/31/2019 due to alcohol intoxication  We are consulted for left foot wound  He states he had a number of blisters on his feet bilaterally that have been resolving  He is not sure how long he has had this wound on the left dorsal foot  He has not tried treating these with anything at home  Review of Systems:    Review of Systems   Constitutional: Negative  HENT: Negative  Eyes: Negative  Respiratory: Negative  Cardiovascular: Negative  Gastrointestinal: Negative  Musculoskeletal: negative  Skin: non-healing sores  Neurological: Negative  Psych: Negative  Past Medical and Surgical History:     Past Medical History:   Diagnosis Date    COPD (chronic obstructive pulmonary disease) ([place] [address] )        History reviewed  No pertinent surgical history  Meds/Allergies:    PTA meds:   Prior to Admission Medications   Prescriptions Last Dose Informant Patient Reported?  Taking?   acetaminophen (TYLENOL) 325 mg tablet   Yes No   Sig: Take 650 mg by mouth every 6 (six) hours as needed for mild pain or headaches   albuterol (VENTOLIN HFA) 90 mcg/act inhaler   No No   Sig: Inhale 2 puffs every 6 (six) hours as needed for wheezing      Facility-Administered Medications: None       Allergies: No Known Allergies    Social History:     Marital Status: Legally     Substance Use History:   Social History     Substance and Sexual Activity   Alcohol Use Yes    Comment: every day     Social History     Tobacco Use   Smoking Status Current Every Day Smoker    Packs/day: 1 00    Years: 40 00    Pack years: 40 00    Types: Cigarettes   Smokeless Tobacco Never Used     Social History     Substance and Sexual Activity   Drug Use No       Family History:    History reviewed  No pertinent family history  Physical Exam:     Vitals:   Blood Pressure: 145/88 (11/01/19 0900)  Pulse: 84 (11/01/19 0741)  Temperature: 97 9 °F (36 6 °C) (11/01/19 0741)  Temp Source: Oral (10/31/19 1900)  Respirations: 14 (11/01/19 0741)  SpO2: 94 % (11/01/19 0741)        Physical Exam  General Appearance:    Alert, cooperative, no distress   Head:    Normocephalic, without obvious abnormality, atraumatic   Eyes:    PERRL, conjunctiva/corneas clear      Nose:   Moist mucous membranes   Neck:   Supple, symmetrical, trachea midline   Back:     Symmetric   Lungs:     Respirations unlabored   Heart:    Regular rate and rhythm   Abdomen:     Soft, non-tender    Foot Exam     Extremities:   MMT is 5/5 to all compartments of the LE, +0/4 edema B/L, No pain on palpation noted bilaterally  No calf tenderness noted bilaterally  Vascular:   Left foot DP present, PT present, Right foot DP present, Right foot PT present  Capillary refill normal to all digits  Pedal hair is Present   Skin:   Skin is of Normal appearance  Skin is of Normal  turgor  Skin is of Normal temperature   Left dorsal superficial erosion with no surrounding erythema or fluctuance or crepitus noted  Right medial hallux sanguinous bullae  Right 2nd digit deroofed blister with no signs of infection     Neurologic:   Gross sensation is Intact  Protective sensation is Intact  Images:      Left dorsal foot    Additional Data:     Lab Results: I have personally reviewed pertinent reports  Results from last 7 days   Lab Units 10/31/19  1549   WBC Thousand/uL 4 60   HEMOGLOBIN g/dL 13 9   HEMATOCRIT % 41 2   PLATELETS Thousands/uL 241   NEUTROS PCT % 52   LYMPHS PCT % 38   MONOS PCT % 7   EOS PCT % 2     Results from last 7 days   Lab Units 10/31/19  1549   POTASSIUM mmol/L 3 3*   CHLORIDE mmol/L 115*   CO2 mmol/L 25   BUN mg/dL 6   CREATININE mg/dL 0 78   CALCIUM mg/dL 8 1*   ALK PHOS U/L 91   ALT U/L 70   AST U/L 184*           Imaging: I have personally reviewed pertinent films in PACS    No results found  ** Please Note: This note has been constructed using a voice recognition system   **

## 2019-11-01 NOTE — CONSULTS
Consultation - [address] 53 y o  male MRN: [id number]  Unit/Bed#: [facility] 806-01 Encounter: [id number]      Chief Complaint:  I drink every day    History of Present Illness   Physician Requesting Consult: aSritha Mohan MD  Reason for Consult / Principal Problem:  Unable to 302, alcohol intoxication, chronic alcohol abuse    Kristy Oliva is a 53 y o  male with medical history of COPD, tobacco abuse and alcohol abuse presents with to the hospital brought in by the police after he was found outside drunk  Patient have a longstanding history of alcohol use, he states that he drinks every day about 10 drinks per day  He also smokes 1-2 packs a day  Patient states that he live with someone that he met in the rehab  He states that eat sometimes, he has a sleep disturbance  He is not using any drugs but he used drugs in the past   Patient states that he does not feel depressed, he denies any suicidal thoughts plans or intent patient is not psychotic at this moment  He never had been admitted to a psychiatric unit, he was in assisted for 3 months and he went to rehab for 7 months and was only time that he was sober  He started to drink every day after he left rehab  He denies any other issues        Psychiatric Review Of Systems:  sleep: yes  appetite changes: no  weight changes: no  energy/anergy: no  interest/pleasure/anhedonia: no  somatic symptoms: no  anxiety/panic: no  ronni: no  guilty/hopeless: no  self injurious behavior/risky behavior: no    Historical Information   Past Psychiatric History:   None  Currently in treatment with none  Past Suicide attempts:  None  Past Violent behavior:  None  Past Psychiatric medication trial:  None    Substance Abuse History:  He has a longstanding history of alcohol use for many years he was in rehab 1, he also was in penitentiary secondary to alcohol use    He does not have any history of alcohol withdrawal   He has a history of methamphetamine use for 8 years and stopped 2 years ago, he used cocaine 30 years ago, he also used acid and mushrooms when he was in high school  I have assessed this patient for substance use within the past 12 months     History of IP/OP rehabilitation program:  He was in rehab for 7 months apparently request from court  Smoking history:  He smokes 2 pack a day for 39 year  Family Psychiatric History:   None    Social History  Education: high school diploma/GED  Learning Disabilities: None  Marital history: single  Living arrangement, social support: He live with  roommate  Occupational History: unemployed  Functioning Relationships: poor support system    Other Pertinent History: He had been in care home secondary to alcohol    Traumatic History:   Abuse: None  Other Traumatic Events: none    Past Medical History:   Diagnosis Date    COPD (chronic obstructive pulmonary disease) ([facility] [address] )        Medical Review Of Systems:  Review of Systems - Negative except hand tremors, all other systems reviewed were negative    Meds/Allergies   current meds:   Current Facility-Administered Medications   Medication Dose Route Frequency    albuterol (PROVENTIL HFA,VENTOLIN HFA) inhaler 2 puff  2 puff Inhalation Q6H PRN    dextrose 5 % infusion  150 mL/hr Intravenous Continuous    folic acid 1 mg, thiamine (VITAMIN B1) 100 mg in sodium chloride 0 9 % 100 mL IV piggyback   Intravenous Daily    nicotine (NICODERM CQ) 21 mg/24 hr TD 24 hr patch 1 patch  1 patch Transdermal Daily    potassium chloride (K-DUR,KLOR-CON) CR tablet 40 mEq  40 mEq Oral BID     No Known Allergies    Objective   Vital signs in last 24 hours:  Temp:  [97 °F (36 1 °C)-98 1 °F (36 7 °C)] 97 °F (36 1 °C)  HR:  [] 101  Resp:  [14-22] 16  BP: (102-162)/() 149/101      Intake/Output Summary (Last 24 hours) at 11/1/2019 1145  Last data filed at 11/1/2019 0855  Gross per 24 hour   Intake 100 ml   Output 1750 ml   Net -1650 ml       Mental Status Evaluation:  Appearance: disheveled and older than stated age   Behavior:  cooperative   Speech:  normal pitch and normal volume   Mood:  euthymic   Affect:  mood-congruent   Language: naming objects and repeating phrases   Thought Process:  goal directed   Associations: intact associations   Thought Content:  normal   Perceptual Disturbances: None   Risk Potential: He denies any suicidal homicidal ideation plan or intent   Sensorium:  person, place, time/date, situation and day of week   Memory:  recent and remote memory grossly intact   Cognition:  grossly intact   Consciousness:  alert and awake    Attention: attention span and concentration were age appropriate   Intellect: within normal limits   Fund of Knowledge: awareness of current events: Fair, past history: Fair and vocabulary: Fair   Insight:  limited   Judgment: limited   Muscle Strength and Tone: Within normal limits   Gait/Station: normal gait/station and normal balance   Motor Activity: no abnormal movements     Lab Results:    I have personally reviewed all pertinent laboratory/tests results    Labs in last 72 hours:   Recent Labs     10/31/19  1549   WBC 4 60   RBC 4 15   HGB 13 9   HCT 41 2      RDW 15 6*   NEUTROABS 2 38   SODIUM 150*   K 3 3*   *   CO2 25   BUN 6   CREATININE 0 78   GLUC 152*   CALCIUM 8 1*   *   ALT 70   ALKPHOS 91   TP 6 1*   ALB 3 7   TBILI 0 25     Drug Screen:   Lab Results   Component Value Date    AMPMETHUR Negative 10/31/2019    BARBTUR Negative 10/31/2019    BDZUR Negative 10/31/2019    THCUR Negative 10/31/2019    COCAINEUR Negative 10/31/2019    METHADONEUR Negative 10/31/2019    OPIATEUR Negative 10/31/2019    PCPUR Negative 10/31/2019     Medical alcohol level   Lab Results   Component Value Date    ETOH 264 (H) 10/31/2019       Code Status: )Level 1 - Full Code    Assessment/Plan     Assessment:  Nico Stahl is a 53 y o  male with COPD and a longstanding history of alcohol use who presented to the hospital with alcohol intoxication  Alcohol level 264  He had been drinking every days after he was released from rehab, he drinks approximately 10 drinks a day and most of them are cooking wine  Patient states that he does not have any depression symptoms, denies suicidal thoughts plans or intent,, patient is not psychotic     Diagnosis:  Alcohol abuse with intoxication  Plan:   Continue medical management  Patient had been placing one-to-one for elopement risk, patient had been declared  incompetent by court and he should being one-to-one until his discharge from the hospital  At this moment I do not have any criteria to 302  the patient  Discussed with primary team  No other intervention at this time  I will sign off  Risks, benefits and possible side effects of Medications:     No medication given      Becca Milan MD

## 2019-11-01 NOTE — CONSULTS
I spoke with the resident who will consult podiatry for management of foot wounds  Wound care will sign off

## 2019-11-01 NOTE — ASSESSMENT & PLAN NOTE
Not in exacerbation  States daily albuterol use    Endorses better breathing and no cough at this time  - continue albuterol prn

## 2019-11-01 NOTE — PROGRESS NOTES
INTERNAL MEDICINE RESIDENCY PROGRESS NOTE     Name: Garth Taylor   Age & Sex: 53 y o  male   MRN: [id number]  Unit/Bed#: [facility] 806-01   Encounter: [id number]  Team: SOD Team A    PATIENT INFORMATION     Name: Garth Taylor   Age & Sex: 53 y o  male   MRN: [id number]  Hospital Stay Days: 0    ASSESSMENT/PLAN     Principal Problem:    Alcohol intoxication ([place] [address] )  Active Problems:    Nicotine dependence    Chronic obstructive pulmonary disease with acute exacerbation ([place] [address] )    Alcohol withdrawal ([place] [address] )    Multiple open wounds of right lower extremity    Hypernatremia    Hypokalemia      Hypokalemia  Assessment & Plan  Likely secondary to poor PO intake  - Kdur 40 BID   - continue to monitor     Hypernatremia  Assessment & Plan  Likely secondary to volume depletion in setting of EtOH diuresis  - D5W 150cc/hr   - continue to monitor BMP    Multiple open wounds of right lower extremity  Assessment & Plan  Patient endorsed walking long distances without socks and has multiple blisters  - wound care consulted recommended podiatry consult  - podiatry consult for potentially infected wounds    Alcohol withdrawal ([place] [address] )  Assessment & Plan  Endorses daily EtOH use 3-4 beers and 3-4 shots of hard liquor daily, as well as history of withdrawal   FARRUKH 264 on admission    - [facility] protocol  - thiamine   - consult Psychiatry for further evaluation at the request of crisis worker    Chronic obstructive pulmonary disease with acute exacerbation ([place] [address] )  Assessment & Plan  Chronic  States daily albuterol use  Diffuse expiratory wheezes throughout  - continue albuterol prn    Nicotine dependence  Assessment & Plan  Tobacco abuse:  Counseled for cessation  -nicotine patch      Disposition: continue inpatient    SUBJECTIVE     Patient seen and examined  No acute events overnight  Patient endorsed drinking 3-4 beers and 3-4 shots of hard liquor yesterday and says that's about how much he drinks on a daily basis    He also endorsed having EtOH withdrawal in the past   He also states he frequently walks with out socks  He denies any withdrawal symptoms at this time  No headache, chest pain, or sob  OBJECTIVE     Vitals:    19 0257 19 0427 19 0549 19 0741   BP: 133/89 162/80 141/98 (!) 146/107   Pulse: 90 105 89 84   Resp: 22   14   Temp: 98 1 °F (36 7 °C) 97 8 °F (36 6 °C)  97 9 °F (36 6 °C)   TempSrc:       SpO2: 91% 95% (!) 89% 94%      Temperature:   Temp (24hrs), Av 9 °F (36 6 °C), Min:97 7 °F (36 5 °C), Max:98 1 °F (36 7 °C)    Temperature: 97 9 °F (36 6 °C)  Intake & Output:  I/O       10/30 07 - 10/31 0700 10/31 07 -  0700  07 -  0700    IV Piggyback  100     Total Intake  100     Urine  1750     Stool  0     Total Output  1750     Net  -1650            Unmeasured Stool Occurrence  0 x         Weights: There is no height or weight on file to calculate BMI  Weight (last 2 days)     None        Physical Exam   Constitutional: He is oriented to person, place, and time  He appears well-developed and well-nourished  No distress  HENT:   Head: Normocephalic and atraumatic  Eyes: Pupils are equal, round, and reactive to light  EOM are normal    Cardiovascular: Normal rate, regular rhythm and normal heart sounds  Exam reveals no friction rub  No murmur heard  Pulmonary/Chest: Effort normal  No stridor  No respiratory distress  He has wheezes  He has no rales  Diffuse expiratory wheezes throughout   Abdominal: Soft  Bowel sounds are normal  He exhibits no distension and no mass  There is no tenderness  There is no rebound and no guarding  Musculoskeletal: Normal range of motion  He exhibits no edema, tenderness or deformity  Neurological: He is alert and oriented to person, place, and time  No cranial nerve deficit  Skin: Skin is warm and dry  He is not diaphoretic     Feet with multiple erythematous vesicles with the most prominent on his dorsal side of his R foot   Vitals reviewed  LABORATORY DATA     Labs: I have personally reviewed pertinent reports  Results from last 7 days   Lab Units 10/31/19  1549   WBC Thousand/uL 4 60   HEMOGLOBIN g/dL 13 9   HEMATOCRIT % 41 2   PLATELETS Thousands/uL 241   NEUTROS PCT % 52   MONOS PCT % 7      Results from last 7 days   Lab Units 10/31/19  1549   POTASSIUM mmol/L 3 3*   CHLORIDE mmol/L 115*   CO2 mmol/L 25   BUN mg/dL 6   CREATININE mg/dL 0 78   CALCIUM mg/dL 8 1*   ALK PHOS U/L 91   ALT U/L 70   AST U/L 184*                            IMAGING & DIAGNOSTIC TESTING     Radiology Results: I have personally reviewed pertinent reports  No results found  Other Diagnostic Testing: I have personally reviewed pertinent reports  ACTIVE MEDICATIONS     Current Facility-Administered Medications   Medication Dose Route Frequency    albuterol (PROVENTIL HFA,VENTOLIN HFA) inhaler 2 puff  2 puff Inhalation Q6H PRN    dextrose 5 % infusion  150 mL/hr Intravenous Continuous    folic acid 1 mg, thiamine (VITAMIN B1) 100 mg in sodium chloride 0 9 % 100 mL IV piggyback   Intravenous Daily    nicotine (NICODERM CQ) 21 mg/24 hr TD 24 hr patch 1 patch  1 patch Transdermal Daily    potassium chloride (K-DUR,KLOR-CON) CR tablet 40 mEq  40 mEq Oral BID       VTE Pharmacologic Prophylaxis: Reason for no pharmacologic prophylaxis low risk  VTE Mechanical Prophylaxis: sequential compression device    Portions of the record may have been created with voice recognition software  Occasional wrong word or "sound a like" substitutions may have occurred due to the inherent limitations of voice recognition software    Read the chart carefully and recognize, using context, where substitutions have occurred   ==  Berry Diana MD  [address]  Internal Medicine Residency PGY-1

## 2019-11-01 NOTE — UTILIZATION REVIEW
Initial Clinical Review    Admission: Date/Time/Statement: OBSERVATION 10/31/19 @ 1420    Orders Placed This Encounter   Procedures    Place in Observation     Standing Status:   Standing     Number of Occurrences:   1     Order Specific Question:   Admitting Physician     Answer:   Rayna Brizuela [[id number]]     Order Specific Question:   Level of Care     Answer:   Med Surg [[id number]]     ED Arrival Information     Expected Arrival Acuity Means of Arrival Escorted By Service Admission Type    - 10/31/2019 11:03 Urgent Ambulance [facility] General Medicine Urgent    Arrival Complaint    alcohol intoxication        Chief Complaint   Patient presents with    Alcohol Intoxication     Patient reported to have been picked up by police when he was hunched over because he appeared intoxicated  Patient reports he has been drinking alcohol for last 2 weeks continuously  Does not want rehab  Assessment/Plan:   MR TAMEZ IS A 54 YO MALE WHO PRESENTS TO THE ED VIA EMS AFTER BEING FOUND OUTSIDE BY POLICE  HE HAS NO ACUTE COMPLAINTS AND IS UNCOOPERATIVE WITH STAFF  KNOWN TO WALK BAREFOOT OUTSIDE FOR LONG DISTANCES  BLOOD ALCOHOL , , K3 3, , UDS NEGATIVE, BREATHALYZER 0 287  PT IS HYPERTENSIVE  HE IS OUT OF REHAB X 7 MONTHS AND RECENTLY HE HAS BEEN  DRINKING DAILY  HAS BEEN DEEMED INCOMPETENT BY STATE AND SISTER IS GUARDIAN  HAS BEEN THROUGH WITHDRAWAL BEFORE AND DOES NOT HAVE SEIZURE HISTORY   PMH:  COPD, TOBACCO AND ALCOHOL ABUSE  ADMITTED TO OBSERVATION FOR ALCOHOL DEPENDENCE - IV THIAMINE, FOLATE, NPO, CONS PSYCH, CIWA  COPD - PRN ALBUTEROL  TOBACCO ABUSE - PATCH  BLE WOUNDS  - WOUND CARE CONSULT - LOCAL WOUND CARE FOR SUPERFICIAL EROSION OF L DORSAL FOOT WITH NO INFECTION       11/1 AFTERNOON UPDATE:     11/1 PSYCHIATRIC CONSULT   Alcohol abuse with intoxication  Plan:   Continue medical management  Patient had been placing one-to-one for elopement risk, patient had been declared incompetent by court and he should being one-to-one until his discharge from the hospital  At this moment I do not have any criteria to 302  the patient    ED Triage Vitals [10/31/19 1108]   Temperature Pulse Respirations Blood Pressure SpO2   98 °F (36 7 °C) 95 18 (!) 156/106 95 %      Temp Source Heart Rate Source Patient Position - Orthostatic VS BP Location FiO2 (%)   Oral Monitor Lying Left arm --      Pain Score       No Pain        Wt Readings from Last 1 Encounters:   10/13/19 79 4 kg (175 lb 0 7 oz)     Additional Vital Signs:   11/01/19 07:41:35  97 9 °F (36 6 °C)  84  14  146/107Abnormal   120  94 %  --   11/01/19 05:49:19  --  89  --  141/98  112  89 %Abnormal   --   11/01/19 04:27:19  97 8 °F (36 6 °C)  105  --  162/80  148  95 %  --   11/01/19 02:57:27  98 1 °F (36 7 °C)  90  22  133/89  104  91 %  --   11/01/19 0256  --  --  --  --  --  90 %  --   11/01/19 0024  --  90  --  --  --  89 %Abnormal    --   10/31/19 23:21:05  98 1 °F (36 7 °C)  101  --  --  --  88 %Abnormal   --   10/31/19 23:13:37  --  95  --  132/80  97  86 %Abnormal   --   10/31/19 1900  97 8 °F (36 6 °C)  97  18  128/82  97  91 %  None (Room air)   10/31/19 16:18:09  97 7 °F (36 5 °C)  88  14  102/63  76  90 %  --   10/31/19 1421  --  79  18  121/59  --  95 %  None (Room air)     Pertinent Labs/Diagnostic Test Results:     Results from last 7 days   Lab Units 10/31/19  1549   WBC Thousand/uL 4 60   HEMOGLOBIN g/dL 13 9   HEMATOCRIT % 41 2   PLATELETS Thousands/uL 241   NEUTROS ABS Thousands/µL 2 38     Results from last 7 days   Lab Units 10/31/19  1549   SODIUM mmol/L 150*   POTASSIUM mmol/L 3 3*   CHLORIDE mmol/L 115*   CO2 mmol/L 25   ANION GAP mmol/L 10   BUN mg/dL 6   CREATININE mg/dL 0 78   EGFR ml/min/1 73sq m 102   CALCIUM mg/dL 8 1*     Results from last 7 days   Lab Units 10/31/19  1549   AST U/L 184*   ALT U/L 70   ALK PHOS U/L 91   TOTAL PROTEIN g/dL 6 1*   ALBUMIN g/dL 3 7   TOTAL BILIRUBIN mg/dL 0 25     Results from last 7 days   Lab Units 10/31/19  1549   GLUCOSE RANDOM mg/dL 152*     Results from last 7 days   Lab Units 10/31/19  1135   AMPH/METH  Negative   BARBITURATE UR  Negative   BENZODIAZEPINE UR  Negative   COCAINE UR  Negative   METHADONE URINE  Negative   OPIATE UR  Negative   PCP UR  Negative   THC UR  Negative     Results from last 7 days   Lab Units 10/31/19  1549   ETHANOL LVL mg/dL 264*     ED Treatment:   Medication Administration from 10/31/2019 1103 to 10/31/2019 1602    Date/Time Order Dose Route Action   10/31/2019 1217 OLANZapine (ZyPREXA ZYDIS) dispersible tablet 10 mg 10 mg Oral Given   10/31/2019 1448 LORazepam (ATIVAN) 2 mg/mL injection 2 mg 2 mg Intramuscular Given   10/31/2019 1448 ziprasidone (GEODON) IM injection 20 mg 20 mg Intramuscular Given        Past Medical History:   Diagnosis Date    COPD (chronic obstructive pulmonary disease) ([facility] )      Present on Admission:   Nicotine dependence   Chronic obstructive pulmonary disease with acute exacerbation (HCC)   Alcohol withdrawal ([facility] )    Admitting Diagnosis: Alcohol intoxication ([facility] ) [F10 929]  Chronic alcohol abuse [F10 10]  Wounds, multiple open, lower extremity, unspecified laterality, initial encounter [Z09 202Q]     Age/Sex: 53 y o  male     Admission Orders:  Scheduled Medications:    Medications:  folic acid 1 mg, thiamine 100 mg in 0 9% sodium chloride 100 mL IVPB  Intravenous Daily   nicotine 1 patch Transdermal Daily   potassium chloride 40 mEq Oral BID     Continuous IV Infusions:    sodium chloride 75 mL/hr Intravenous Continuous     PRN Meds:    albuterol 2 puff Inhalation Q6H PRN     CONT PULSE OX  AMBULATE PATIENT   UP W/ ASSIST  CIWA MONITORING - 4, 1 X 3, 12, 5  DIET NPO  CLOSE OBSERVATION   IP CONSULT TO ED CRISIS WORKER  IP CONSULT TO PSYCHIATRY  IP CONSULT TO CASE MANAGEMENT  IP CONSULT TO WOUND CARE    Network Utilization Review Department  [email or web address]  ATTENTION: Please call with any questions or concerns to [phone] and carefully listen to the prompts so that you are directed to the right person  All voicemails are confidential   Pennie Davis all requests for admission clinical reviews, approved or denied determinations and any other requests to dedicated fax number below belonging to the campus where the patient is receiving treatment    FACILITY NAME UR FAX NUMBER   ADMISSION DENIALS (Administrative/Medical Necessity) [address] [facility] (Maternity/NICU/Pediatrics) [phone]   [facility] [address] [place] [address] [address] [facility] [phone]   [facility] [address] [facility] [phone]   Helon Milder [address] [phone]
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
## 2019-11-01 NOTE — ASSESSMENT & PLAN NOTE
Endorses daily EtOH use 3-4 beers and 3-4 shots of hard liquor daily, as well as history of withdrawal   FARRUKH 264 on admission   Patient currently denying withdrawal symptoms  - [facility] protocol  - thiamine   - Psychiatry consulted at the request of sister, does not meet inpatient criteria

## 2019-11-01 NOTE — ASSESSMENT & PLAN NOTE
Likely secondary to volume depletion in setting of EtOH diuresis   Currently 142 following d5 infusion  - continue to monitor BMP

## 2019-11-01 NOTE — SOCIAL WORK
Cm reviewed patient's case due to social concerns  Patient seen by Dr Heidi Rader  Patient does not meet criteria for psych placement at this time  Patient does not have capacity and patient's sister Chris Webster is emergency contact and Guardian  Copy of Guardianship and POA Documents are in patient's chart  Cm spoke with patient's sister Chris Webster and Nai Stanton who stated patient does not live with them  Patient has been living with a roommate that also struggles with alcohol abuse and is currently at a SNF for further care due to medical condition  Patient does not have supervision at home and continues to drink  Patient's only form of income is $194 in food stamps per month that patient uses to buy cooking alcohol at the store and then drinks  Patient was denied for SSI at this time per Jacki Mayes and family filed for appeal with 's assistance (appeal determination is not known at this time)  Vianney Correa had patient living with them before but stated he was not working and are not agreeable to patient living with them upon discharge  Patient has multiple fines due to public drunkenness at this time  Patient's family asked about psych evaluation determination (patient is not appropriate for psych placement at this time); patient's family is unhappy with this determination but will relay information to their  as information was requested, related to patient's current/oustanding fines  Cm spoke with Jovita Raymond (manager) and confirmed patient is not safe to return to prior living arrangements at this time as patient does not have necessary supervision/assistance needed while not having capacity to make decisions  List of [address] were emailed to Jacki Mayes ([email or web address]) for review  Cm began OPTION paperwork for patient and placed at  desk (weekend Cm informed that this needs completion and will send to [facility] to begin level II assessment for placement)    No weekend discharge anticipated  Cm following

## 2019-11-01 NOTE — ASSESSMENT & PLAN NOTE
Patient endorsed walking long distances without socks and has multiple blisters  - wound care consulted recommended podiatry consult  - podiatry managing wounds at this time, do not feel they are infected
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
## 2019-11-01 NOTE — SOCIAL WORK
CM presented obs notice to pt and answered the pt's questions  Pt signed the obs notice and CM sent paper copy to medical records  Pt has a copy with him

## 2019-11-02 PROBLEM — E87.0 HYPERNATREMIA: Status: RESOLVED | Noted: 2019-11-01 | Resolved: 2019-11-02

## 2019-11-02 PROBLEM — F10.929 ALCOHOL INTOXICATION (HCC): Status: RESOLVED | Noted: 2019-10-31 | Resolved: 2019-11-02

## 2019-11-02 PROBLEM — I10 HYPERTENSION: Status: ACTIVE | Noted: 2019-11-02

## 2019-11-02 PROBLEM — E87.6 HYPOKALEMIA: Status: RESOLVED | Noted: 2019-11-01 | Resolved: 2019-11-02

## 2019-11-02 LAB
ANION GAP SERPL CALCULATED.3IONS-SCNC: 7 MMOL/L (ref 4–13)
BUN SERPL-MCNC: 7 MG/DL (ref 5–25)
CALCIUM SERPL-MCNC: 8.6 MG/DL (ref 8.3–10.1)
CHLORIDE SERPL-SCNC: 109 MMOL/L (ref 100–108)
CO2 SERPL-SCNC: 26 MMOL/L (ref 21–32)
CREAT SERPL-MCNC: 0.69 MG/DL (ref 0.6–1.3)
GFR SERPL CREATININE-BSD FRML MDRD: 107 ML/MIN/1.73SQ M
GLUCOSE SERPL-MCNC: 83 MG/DL (ref 65–140)
POTASSIUM SERPL-SCNC: 4.1 MMOL/L (ref 3.5–5.3)
SODIUM SERPL-SCNC: 142 MMOL/L (ref 136–145)

## 2019-11-02 PROCEDURE — 99232 SBSQ HOSP IP/OBS MODERATE 35: CPT | Performed by: INTERNAL MEDICINE

## 2019-11-02 PROCEDURE — 80048 BASIC METABOLIC PNL TOTAL CA: CPT | Performed by: STUDENT IN AN ORGANIZED HEALTH CARE EDUCATION/TRAINING PROGRAM

## 2019-11-02 RX ORDER — CLONIDINE HYDROCHLORIDE 0.1 MG/1
0.1 TABLET ORAL 3 TIMES DAILY
Status: DISCONTINUED | OUTPATIENT
Start: 2019-11-02 | End: 2019-11-04

## 2019-11-02 RX ORDER — LORAZEPAM 1 MG/1
2 TABLET ORAL ONCE
Status: COMPLETED | OUTPATIENT
Start: 2019-11-02 | End: 2019-11-02

## 2019-11-02 RX ORDER — LORAZEPAM 1 MG/1
1 TABLET ORAL ONCE
Status: COMPLETED | OUTPATIENT
Start: 2019-11-02 | End: 2019-11-02

## 2019-11-02 RX ADMIN — CLONIDINE HYDROCHLORIDE 0.1 MG: 0.1 TABLET ORAL at 15:25

## 2019-11-02 RX ADMIN — CLONIDINE HYDROCHLORIDE 0.1 MG: 0.1 TABLET ORAL at 21:43

## 2019-11-02 RX ADMIN — CLONIDINE HYDROCHLORIDE 0.1 MG: 0.1 TABLET ORAL at 09:16

## 2019-11-02 RX ADMIN — THIAMINE HYDROCHLORIDE: 100 INJECTION, SOLUTION INTRAMUSCULAR; INTRAVENOUS at 09:07

## 2019-11-02 RX ADMIN — LORAZEPAM 2 MG: 1 TABLET ORAL at 09:49

## 2019-11-02 RX ADMIN — NICOTINE 1 PATCH: 21 PATCH, EXTENDED RELEASE TRANSDERMAL at 09:14

## 2019-11-02 RX ADMIN — LORAZEPAM 2 MG: 1 TABLET ORAL at 05:10

## 2019-11-02 RX ADMIN — ACETAMINOPHEN 975 MG: 325 TABLET ORAL at 15:25

## 2019-11-02 RX ADMIN — LORAZEPAM 1 MG: 1 TABLET ORAL at 22:14

## 2019-11-02 RX ADMIN — ACETAMINOPHEN 975 MG: 325 TABLET ORAL at 05:10

## 2019-11-02 RX ADMIN — ACETAMINOPHEN 975 MG: 325 TABLET ORAL at 21:43

## 2019-11-02 RX ADMIN — ALBUTEROL SULFATE 2 PUFF: 90 AEROSOL, METERED RESPIRATORY (INHALATION) at 15:25

## 2019-11-02 NOTE — ASSESSMENT & PLAN NOTE
Likely secondary to withdrawal tachycardia    resolved  - hydralazine 10mg prn  - continue to monitor

## 2019-11-02 NOTE — PROGRESS NOTES
INTERNAL MEDICINE RESIDENCY PROGRESS NOTE     Name: Beverly Link   Age & Sex: 53 y o  male   MRN: [id number]  Unit/Bed#: [facility] 806-01   Encounter: [id number]  Team: SOD Team A    PATIENT INFORMATION     Name: Beverly Link   Age & Sex: 53 y o  male   MRN: [id number]  Hospital Stay Days: 0    ASSESSMENT/PLAN     Active Problems:    Nicotine dependence    Chronic obstructive pulmonary disease with acute exacerbation (HCC)    Alcohol withdrawal ([facility] )    Multiple open wounds of right lower extremity    Hypertension      Hypertension  Assessment & Plan  Likely secondary to withdrawal tachycardia  - will start on clonidine 0 1mh TID    Multiple open wounds of right lower extremity  Assessment & Plan  Patient endorsed walking long distances without socks and has multiple blisters  - wound care consulted recommended podiatry consult  - podiatry managing wounds at this time, do not feel they are infected    Alcohol withdrawal ([facility] )  Assessment & Plan  Endorses daily EtOH use 3-4 beers and 3-4 shots of hard liquor daily, as well as history of withdrawal   FARRUKH 264 on admission  Patient currently denying withdrawal symptoms  - [facility] protocol  - thiamine   - Psychiatry consulted at the request of sister, does not meet inpatient criteria    Chronic obstructive pulmonary disease with acute exacerbation [facility])  Assessment & Plan  Chronic  States daily albuterol use  Diffuse expiratory wheezes throughout  - continue albuterol prn    Nicotine dependence  Assessment & Plan  Tobacco abuse:  Counseled for cessation  -nicotine patch    Hypokalemia-resolved as of 11/2/2019  Assessment & Plan  Likely secondary to poor PO intake  - Kdur 40mg x2 -> K currently 4 1  - continue to monitor     Hypernatremia-resolved as of 11/2/2019  Assessment & Plan  Likely secondary to volume depletion in setting of EtOH diuresis   Currently 142 following d5 infusion  - continue to monitor BMP      Disposition: continue inpatient    SUBJECTIVE Patient seen and examined  No acute events overnight  Per CM patient's sister does not want him to return to his current residence  Patient wants input on where he will be going  He denies any withdrawal symptoms at this time  No headache, chest pain, or sob  OBJECTIVE     Vitals:    19 0100 19 0111 19 0501 19 0857   BP: (!) 188/117 152/98 (!) 160/109 146/88   Pulse: 69 71 73 93   Resp:    19   Temp: 97 5 °F (36 4 °C)  97 7 °F (36 5 °C) 97 7 °F (36 5 °C)   TempSrc:       SpO2: 98% 97% 95% 97%      Temperature:   Temp (24hrs), Av 8 °F (36 6 °C), Min:97 °F (36 1 °C), Max:98 6 °F (37 °C)    Temperature: 97 7 °F (36 5 °C)  Intake & Output:  I/O       10/30 0701 - 10/31 0700 10/31 07 -  0700  07 -  0700    IV Piggyback  100     Total Intake  100     Urine  1750     Stool  0     Total Output  1750     Net  -1650            Unmeasured Stool Occurrence  0 x         Weights: There is no height or weight on file to calculate BMI  Weight (last 2 days)     None        Physical Exam   Constitutional: He is oriented to person, place, and time  He appears well-developed and well-nourished  No distress  HENT:   Head: Normocephalic and atraumatic  Eyes: Pupils are equal, round, and reactive to light  EOM are normal    Cardiovascular: Normal rate, regular rhythm and normal heart sounds  Exam reveals no friction rub  No murmur heard  Pulmonary/Chest: Effort normal  No stridor  No respiratory distress  He has wheezes  He has no rales  Diffuse expiratory wheezes throughout   Abdominal: Soft  Bowel sounds are normal  He exhibits no distension and no mass  There is no tenderness  There is no rebound and no guarding  Musculoskeletal: Normal range of motion  He exhibits no edema, tenderness or deformity  Neurological: He is alert and oriented to person, place, and time  No cranial nerve deficit  Skin: Skin is warm and dry  He is not diaphoretic     Feet with multiple erythematous vesicles with the most prominent on his dorsal side of his R foot   Vitals reviewed  LABORATORY DATA     Labs: I have personally reviewed pertinent reports  Results from last 7 days   Lab Units 10/31/19  1549   WBC Thousand/uL 4 60   HEMOGLOBIN g/dL 13 9   HEMATOCRIT % 41 2   PLATELETS Thousands/uL 241   NEUTROS PCT % 52   MONOS PCT % 7      Results from last 7 days   Lab Units 11/02/19  0456 11/01/19  1454 10/31/19  1549   POTASSIUM mmol/L 4 1 3 7 3 3*   CHLORIDE mmol/L 109* 107 115*   CO2 mmol/L 26 27 25   BUN mg/dL 7 11 6   CREATININE mg/dL 0 69 0 70 0 78   CALCIUM mg/dL 8 6 8 6 8 1*   ALK PHOS U/L  --   --  91   ALT U/L  --   --  70   AST U/L  --   --  184*                            IMAGING & DIAGNOSTIC TESTING     Radiology Results: I have personally reviewed pertinent reports  No results found  Other Diagnostic Testing: I have personally reviewed pertinent reports  ACTIVE MEDICATIONS     Current Facility-Administered Medications   Medication Dose Route Frequency    acetaminophen (TYLENOL) tablet 975 mg  975 mg Oral Q6H PRN    albuterol (PROVENTIL HFA,VENTOLIN HFA) inhaler 2 puff  2 puff Inhalation Q6H PRN    cloNIDine (CATAPRES) tablet 0 1 mg  0 1 mg Oral TID    folic acid 1 mg, thiamine (VITAMIN B1) 100 mg in sodium chloride 0 9 % 100 mL IV piggyback   Intravenous Daily    nicotine (NICODERM CQ) 21 mg/24 hr TD 24 hr patch 1 patch  1 patch Transdermal Daily       VTE Pharmacologic Prophylaxis: Reason for no pharmacologic prophylaxis low risk  VTE Mechanical Prophylaxis: sequential compression device    Portions of the record may have been created with voice recognition software  Occasional wrong word or "sound a like" substitutions may have occurred due to the inherent limitations of voice recognition software    Read the chart carefully and recognize, using context, where substitutions have occurred   ==  Florinda Perez MD  [facility]  Internal Medicine Residency PGY-1

## 2019-11-03 PROBLEM — J44.9 COPD (CHRONIC OBSTRUCTIVE PULMONARY DISEASE) (HCC): Status: ACTIVE | Noted: 2019-01-24

## 2019-11-03 PROBLEM — F10.239 ALCOHOL WITHDRAWAL (HCC): Status: RESOLVED | Noted: 2019-10-12 | Resolved: 2019-11-03

## 2019-11-03 PROBLEM — F10.230 ALCOHOL DEPENDENCE WITH UNCOMPLICATED WITHDRAWAL (HCC): Status: ACTIVE | Noted: 2019-01-24

## 2019-11-03 PROCEDURE — 99233 SBSQ HOSP IP/OBS HIGH 50: CPT | Performed by: INTERNAL MEDICINE

## 2019-11-03 RX ORDER — LORAZEPAM 1 MG/1
2 TABLET ORAL ONCE
Status: COMPLETED | OUTPATIENT
Start: 2019-11-03 | End: 2019-11-03

## 2019-11-03 RX ORDER — DIAZEPAM 5 MG/1
10 TABLET ORAL EVERY 8 HOURS
Status: COMPLETED | OUTPATIENT
Start: 2019-11-03 | End: 2019-11-04

## 2019-11-03 RX ADMIN — THIAMINE HYDROCHLORIDE: 100 INJECTION, SOLUTION INTRAMUSCULAR; INTRAVENOUS at 08:24

## 2019-11-03 RX ADMIN — CLONIDINE HYDROCHLORIDE 0.1 MG: 0.1 TABLET ORAL at 15:57

## 2019-11-03 RX ADMIN — ACETAMINOPHEN 975 MG: 325 TABLET ORAL at 13:54

## 2019-11-03 RX ADMIN — NICOTINE 1 PATCH: 21 PATCH, EXTENDED RELEASE TRANSDERMAL at 08:14

## 2019-11-03 RX ADMIN — ACETAMINOPHEN 975 MG: 325 TABLET ORAL at 05:42

## 2019-11-03 RX ADMIN — CLONIDINE HYDROCHLORIDE 0.1 MG: 0.1 TABLET ORAL at 21:26

## 2019-11-03 RX ADMIN — ACETAMINOPHEN 975 MG: 325 TABLET ORAL at 20:31

## 2019-11-03 RX ADMIN — LORAZEPAM 2 MG: 1 TABLET ORAL at 07:15

## 2019-11-03 RX ADMIN — ALBUTEROL SULFATE 2 PUFF: 90 AEROSOL, METERED RESPIRATORY (INHALATION) at 19:16

## 2019-11-03 RX ADMIN — DIAZEPAM 10 MG: 5 TABLET ORAL at 13:54

## 2019-11-03 RX ADMIN — CLONIDINE HYDROCHLORIDE 0.1 MG: 0.1 TABLET ORAL at 08:13

## 2019-11-03 RX ADMIN — DIAZEPAM 10 MG: 5 TABLET ORAL at 21:26

## 2019-11-03 RX ADMIN — ALBUTEROL SULFATE 2 PUFF: 90 AEROSOL, METERED RESPIRATORY (INHALATION) at 08:25

## 2019-11-03 NOTE — PROGRESS NOTES
INTERNAL MEDICINE RESIDENCY PROGRESS NOTE     Name: Roxie Calderon   Age & Sex: 53 y o  male   MRN: [id number]  Unit/Bed#: [facility] 806-01   Encounter: [id number]  Team: SOD Team A    PATIENT INFORMATION     Name: Roxie Calderon   Age & Sex: 53 y o  male   MRN: [id number]  Hospital Stay Days: 1    ASSESSMENT/PLAN     Principal Problem:    Alcohol dependence with uncomplicated withdrawal ([place] [address] )  Active Problems:    COPD (chronic obstructive pulmonary disease) ([place] [address] )    Nicotine dependence    Multiple open wounds of right lower extremity    Hypertension      * Alcohol dependence with uncomplicated withdrawal ([place] [address] )  Assessment & Plan  Endorses daily EtOH use 3-4 beers and 3-4 shots of hard liquor daily, as well as history of withdrawal   FARRUKH 264 on admission  Patient currently denying withdrawal symptoms, though has continued to score on CIWA  - CIWA protocol  - Will start longer acting benzodiazepine since patient is very unlikely to be discharged today: valium 10mg q8 hours to start at 1400, change to q12 after AM dose on 11/4  - IV thiamine   - Psychiatry consulted at the request of sister, does not meet inpatient criteria    COPD (chronic obstructive pulmonary disease) ([place] [address] )  Assessment & Plan  Not in exacerbation  States daily albuterol use  Expiratory wheezes on right  - continue albuterol prn    Nicotine dependence  Assessment & Plan  Tobacco abuse:  Counseled for cessation  -nicotine patch 21 mcg    Hypertension  Assessment & Plan  Likely secondary to withdrawal tachycardia  - clonidine 0 1mg TID started 11/2    Multiple open wounds of right lower extremity  Assessment & Plan  Patient endorsed walking long distances without socks and has multiple blisters  - wound care consulted recommended podiatry consult  - podiatry managing wounds at this time, do not feel they are infected    Hypokalemia-resolved as of 11/2/2019  Assessment & Plan  Likely secondary to poor PO intake    - Kdur 40mg x2 -> K currently 4 1  - continue to monitor     Hypernatremia-resolved as of 2019  Assessment & Plan  Likely secondary to volume depletion in setting of EtOH diuresis  Currently 142 following d5 infusion  - continue to monitor BMP    Alcohol withdrawal (HCC)-resolved as of 11/3/2019  Assessment & Plan  Endorses daily EtOH use 3-4 beers and 3-4 shots of hard liquor daily, as well as history of withdrawal   FARRUKH 264 on admission  Patient currently denying withdrawal symptoms  - CIWA protocol  - thiamine   - Psychiatry consulted at the request of sister, does not meet inpatient criteria      Disposition: Continue inpatient, OPTION paperwork started to be sent to [facility]  Patient lacks capacity and sister Payton Galarza is his guardian  SUBJECTIVE     Patient seen and examined  No acute events overnight  Per RN, he intermittently becomes mildly agitated but responds well to Ativan when scoring on CIWA  He was walking this morning which also helps agitation  He denies any complaints but states he wants to go home  OBJECTIVE     Vitals:    19 2325 19 0110 19 0524 19 0900   BP: 137/94 139/87 153/99 132/94   Pulse: 80 63 77 92   Resp: 18  16 19   Temp: 97 7 °F (36 5 °C)  97 6 °F (36 4 °C) (!) 97 3 °F (36 3 °C)   TempSrc:       SpO2: 97% 96% 96% 94%   Weight:       Height:          Temperature:   Temp (24hrs), Av 7 °F (36 5 °C), Min:97 3 °F (36 3 °C), Max:98 1 °F (36 7 °C)    Temperature: (!) 97 3 °F (36 3 °C)  Intake & Output:  I/O       701 -  07 07 -  07 07 -  0700    P  O  240 840     I V  (mL/kg) 1737 5      IV Piggyback       Total Intake(mL/kg)  5 840 (10 6)     Urine (mL/kg/hr)       Stool       Total Output       Net + 5 +840            Unmeasured Urine Occurrence 6 x 15 x     Unmeasured Stool Occurrence  0 x         Weights:   IBW: 79 9 kg    Body mass index is 23 09 kg/m²    Weight (last 2 days)     Date/Time   Weight    19 1525   79 4 (175) Physical Exam   Constitutional: He is oriented to person, place, and time  He appears well-developed and well-nourished  No distress  HENT:   Head: Normocephalic and atraumatic  Eyes: Conjunctivae and EOM are normal    Cardiovascular: Normal rate, regular rhythm and normal heart sounds  No murmur heard  Pulmonary/Chest: Effort normal  He has wheezes in the right upper field and the right middle field  He has no rales  Abdominal: Soft  Bowel sounds are normal  There is no tenderness  Musculoskeletal: He exhibits no edema or deformity  Neurological: He is alert and oriented to person, place, and time  No cranial nerve deficit  Coordination normal    States year as 2020   Skin: Skin is warm and dry  Psychiatric: His affect is blunt  He is not aggressive  Cognition and memory are impaired  Patient becomes mildly agitated, asking why he cannot leave, changes his clothes and puts shoes on to leave  LABORATORY DATA     Labs: I have personally reviewed pertinent reports  Results from last 7 days   Lab Units 10/31/19  1549   WBC Thousand/uL 4 60   HEMOGLOBIN g/dL 13 9   HEMATOCRIT % 41 2   PLATELETS Thousands/uL 241   NEUTROS PCT % 52   MONOS PCT % 7      Results from last 7 days   Lab Units 11/02/19  0456 11/01/19  1454 10/31/19  1549   POTASSIUM mmol/L 4 1 3 7 3 3*   CHLORIDE mmol/L 109* 107 115*   CO2 mmol/L 26 27 25   BUN mg/dL 7 11 6   CREATININE mg/dL 0 69 0 70 0 78   CALCIUM mg/dL 8 6 8 6 8 1*   ALK PHOS U/L  --   --  91   ALT U/L  --   --  70   AST U/L  --   --  184*                            IMAGING & DIAGNOSTIC TESTING     Radiology Results: I have personally reviewed pertinent reports  No results found  Other Diagnostic Testing: I have personally reviewed pertinent reports      ACTIVE MEDICATIONS     Current Facility-Administered Medications   Medication Dose Route Frequency    acetaminophen (TYLENOL) tablet 975 mg  975 mg Oral Q6H PRN    albuterol (PROVENTIL HFA,VENTOLIN HFA) inhaler 2 puff  2 puff Inhalation Q6H PRN    cloNIDine (CATAPRES) tablet 0 1 mg  0 1 mg Oral TID    diazepam (VALIUM) tablet 10 mg  10 mg Oral A2Y    folic acid 1 mg, thiamine (VITAMIN B1) 100 mg in sodium chloride 0 9 % 100 mL IV piggyback   Intravenous Daily    nicotine (NICODERM CQ) 21 mg/24 hr TD 24 hr patch 1 patch  1 patch Transdermal Daily       VTE Pharmacologic Prophylaxis: Reason for no pharmacologic prophylaxis low risk, ambulate  VTE Mechanical Prophylaxis: sequential compression device    Portions of the record may have been created with voice recognition software  Occasional wrong word or "sound a like" substitutions may have occurred due to the inherent limitations of voice recognition software    Read the chart carefully and recognize, using context, where substitutions have occurred   ==  Tarik Glover, [facility]  Internal Medicine Residency PGY-2

## 2019-11-03 NOTE — PROGRESS NOTES
PCA saw pt put something in his pants  I asked pt to empty pockets  He gave me 2 lighters and pack of cigarettes    I put them in med box outside of room and explained the importance of not smoking  and danger of smoking in hospital

## 2019-11-03 NOTE — ASSESSMENT & PLAN NOTE
Endorses daily EtOH use 3-4 beers and 3-4 shots of hard liquor daily, as well as history of withdrawal   FARRUKH 264 on admission   Patient currently denying withdrawal symptoms  - [facility] protocol  - discontinue diazepam  - IV thiamine   - Psychiatry consulted at the request of sister, does not meet inpatient criteria

## 2019-11-03 NOTE — UTILIZATION REVIEW
Initial Clinical Review    OBS 10/1 @ 1420 UPGRADED TO INPATIENT 11/2 @ 8[address]    Admission: Date/Time/Statement: Inpatient Admission Orders (From admission, onward)     Ordered        11/02/19 1707  Inpatient Admission  Once                  ED Arrival Information     Expected Arrival 70 [address] of Arrival Escorted By Service Admission Type    - 10/31/2019 11:03 Urgent Ambulance [place] General Medicine Urgent    Arrival Complaint    alcohol intoxication        Chief Complaint   Patient presents with    Alcohol Intoxication     Patient reported to have been picked up by police when he was hunched over because he appeared intoxicated  Patient reports he has been drinking alcohol for last 2 weeks continuously  Does not want rehab  Assessment/Plan: 54 YO MALE WHO PRESENTS TO THE ED VIA EMS AFTER BEING FOUND OUTSIDE BY POLICE  HE HAS NO ACUTE COMPLAINTS AND IS UNCOOPERATIVE WITH STAFF  KNOWN TO WALK BAREFOOT OUTSIDE FOR LONG DISTANCES  BLOOD ALCOHOL , , K3 3, , UDS NEGATIVE, BREATHALYZER 0 287  PT IS HYPERTENSIVE  HE IS OUT OF REHAB X 7 MONTHS AND RECENTLY HE HAS BEEN  DRINKING DAILY  HAS BEEN DEEMED INCOMPETENT BY STATE AND SISTER IS GUARDIAN  HAS BEEN THROUGH WITHDRAWAL BEFORE AND DOES NOT HAVE SEIZURE HISTORY   PMH:  COPD, TOBACCO AND ALCOHOL ABUSE  ADMITTED TO OBSERVATION FOR ALCOHOL DEPENDENCE - IV THIAMINE, FOLATE, NPO, CONS PSYCH, CIWA  COPD - PRN ALBUTEROL  TOBACCO ABUSE - PATCH  BLE WOUNDS  - WOUND CARE CONSULT - LOCAL WOUND CARE FOR SUPERFICIAL EROSION OF L DORSAL FOOT WITH NO INFECTION       CIWA SCOREs -- 4 -- 1 -- 1 -- 1 -- 12 -- 5 -- 2 -- 8 -- 4 -- 6      11/1 PSYCHIATRIC CONSULT   Alcohol abuse with intoxication  Plan:   Continue medical management  Patient had been placing one-to-one for elopement risk, patient had been declared  incompetent by court and he should being one-to-one until his discharge from the hospital  At this moment I do not have any criteria to 302  the patient    Podiatry consult 11/1 -- Local wound care with betadine and DSD changed daily, WBAT, No antibiotic therapy required at this time    Progress note 11/1 --   Continue CIWA protocol for lorazepam dosing, currently lorazepam 2mg/mL Q6 hours  - Discontinue IV thiamine folate  - Take off NPO -- start regular diet  - Discontinue 1 to 1 as patient has no suicidal ideation or homicidal ideation  - K+ Cl- supplementation of 40meQ BID PO for potassium correction  - Hypernatremia correction with 150mL/hr continuous of Dextrose 5%  - Albuterol administered this morning after physical examination  - Continue PRN albuterol inhaler Q6 hours  - Nicotine patch     11/2 -- CIWA -- 3 -- 12 -- 8 -- 6 --1 -- 7  Tachycardia, started on clonidine, wound care consulted for foot wounds, Kdur 40 mg x2 for K 4 1    11/3 --  CIWA scores 7 -- 5 -- 7  Will start longer acting benzodiazepine since patient is very unlikely to be discharged today: valium 10mg q8 hours to start at 1400, change to q12 after AM dose on 11/4  - IV thiamine       ED Triage Vitals [10/31/19 1108]   Temperature Pulse Respirations Blood Pressure SpO2   98 °F (36 7 °C) 95 18 (!) 156/106 95 %      Temp Source Heart Rate Source Patient Position - Orthostatic VS BP Location FiO2 (%)   Oral Monitor Lying Left arm --      Pain Score       No Pain        Wt Readings from Last 1 Encounters:   11/02/19 79 4 kg (175 lb)     Additional Vital Signs:   11/03/19 13:53:51  97 7 °F (36 5 °C)  83  16  131/94  106  95 %  --   11/03/19 09:00:44  97 3 °F (36 3 °C)Abnormal   92  19  132/94  107  94 %  --   11/03/19 0803  --  --  --  --  --  --  None (Room air)   11/03/19 05:24:18  97 6 °F (36 4 °C)  77  16  153/99  117  96 %  --   11/03/19 01:10:03  --  63  --  139/87  104  96 %  --   11/02/19 23:25:14  97 7 °F (36 5 °C)  80  18  137/94  108  97 %  --   11/02/19 21:01:33  98 1 °F (36 7 °C)  80  17  139/94  109  98 %  -- 11/02/19 16:58:32  97 6 °F (36 4 °C)  84  17  140/101Abnormal   114  96 %  --   11/02/19 14:52:26  97 6 °F (36 4 °C)  89  18  141/101Abnormal   114  97 %  --   11/02/19 1300  --  --  --  142/82  --  --  --   11/02/19 12:36:13  98 1 °F (36 7 °C)  81  16  143/101Abnormal   115  96 %  --   11/02/19 08:57:42  97 7 °F (36 5 °C)  93  19  146/88  121  97 %  --   11/02/19 05:01:18  97 7 °F (36 5 °C)  73  --  160/109Abnormal   126  95 %  --   11/02/19 01:11:13  --  71  --  152/98  --  97 %  --   11/02/19 01:00:08  97 5 °F (36 4 °C)  69  17  188/117Abnormal   141  98 %  --   11/01/19 2115  --  87  --  143/90  --  --  --   11/01/19 21:01:39  97 7 °F (36 5 °C)  65  --  143/90  108  95 %  --   11/01/19 21:00:13  97 7 °F (36 5 °C)  69  18  166/99  121  97 %  --   11/01/19 17:03:44  98 4 °F (36 9 °C)  81  20  140/98  112  94 %  --   11/01/19 1525  --  --  --  --  --  --  None (Room air)   11/01/19 15:07:11  97 9 °F (36 6 °C)  89  20  146/102Abnormal   117  95 %  --   11/01/19 15:05:22  97 9 °F (36 6 °C)  87  20  147/102Abnormal   117  94 %  --   11/01/19 13:01:43  98 6 °F (37 °C)  98  18  147/99  115  94 %  --   11/01/19 10:27:20  97 °F (36 1 °C)Abnormal   101  16  149/101Abnormal   117  93 %  --   11/01/19 0900  --  --  --  145/88  --  --  --   11/01/19 07:41:35  97 9 °F (36 6 °C)  84  14  146/107Abnormal   120  94 %  --   11/01/19 05:49:19  --  89  --  141/98  112  89 %Abnormal   --   11/01/19 0500  --  --  --  138/69  --  --  --   11/01/19 04:27:19  97 8 °F (36 6 °C)  105  --  162/80  148  95 %  --   11/01/19 02:57:27  98 1 °F (36 7 °C)  90  22  133/89  104  91 %  --   11/01/19 0256  --  --  --  --  --  90 %  --   11/01/19 0024  --  90  --  --  --  89 %Abnormal    --       Pertinent Labs/Diagnostic Test Results:   Results from last 7 days   Lab Units 10/31/19  1549   WBC Thousand/uL 4 60   HEMOGLOBIN g/dL 13 9   HEMATOCRIT % 41 2   PLATELETS Thousands/uL 241   NEUTROS ABS Thousands/µL 2 38     Results from last 7 days   Lab Units 11/02/19  0456 11/01/19  1454 10/31/19  1549   SODIUM mmol/L 142 140 150*   POTASSIUM mmol/L 4 1 3 7 3 3*   CHLORIDE mmol/L 109* 107 115*   CO2 mmol/L 26 27 25   ANION GAP mmol/L 7 6 10   BUN mg/dL 7 11 6   CREATININE mg/dL 0 69 0 70 0 78   EGFR ml/min/1 73sq m 107 106 102   CALCIUM mg/dL 8 6 8 6 8 1*     Results from last 7 days   Lab Units 10/31/19  1549   AST U/L 184*   ALT U/L 70   ALK PHOS U/L 91   TOTAL PROTEIN g/dL 6 1*   ALBUMIN g/dL 3 7   TOTAL BILIRUBIN mg/dL 0 25     Results from last 7 days   Lab Units 11/02/19  0456 11/01/19  1454 10/31/19  1549   GLUCOSE RANDOM mg/dL 83 124 152*     Results from last 7 days   Lab Units 10/31/19  1135   AMPH/METH  Negative   BARBITURATE UR  Negative   BENZODIAZEPINE UR  Negative   COCAINE UR  Negative   METHADONE URINE  Negative   OPIATE UR  Negative   PCP UR  Negative   THC UR  Negative     Results from last 7 days   Lab Units 10/31/19  1549   ETHANOL LVL mg/dL 264*         ED Treatment:   Medication Administration from 10/31/2019 1103 to 10/31/2019 1602       Date/Time Order Dose Route Action     10/31/2019 1217 OLANZapine (ZyPREXA ZYDIS) dispersible tablet 10 mg 10 mg Oral Given     10/31/2019 1448 LORazepam (ATIVAN) 2 mg/mL injection 2 mg 2 mg Intramuscular Given     10/31/2019 1448 ziprasidone (GEODON) IM injection 20 mg 20 mg Intramuscular Given     Past Medical History:   Diagnosis Date    COPD (chronic obstructive pulmonary disease) ([facility] )      Present on Admission:   Nicotine dependence   COPD (chronic obstructive pulmonary disease) (HCC)   (Resolved) Alcohol withdrawal ([facility] )   Multiple open wounds of right lower extremity   Alcohol dependence with uncomplicated withdrawal ([facility] )      Admitting Diagnosis: Alcohol intoxication ([facility] ) [F10 929]  Chronic alcohol abuse [F10 10]  Wounds, multiple open, lower extremity, unspecified laterality, initial encounter [I05 906G]  Age/Sex: 53 y o  male  Admission Orders:  Scheduled Medications:  Medications:  cloNIDine 0 1 mg Oral TID   diazepam 10 mg Oral J8K   folic acid 1 mg, thiamine 100 mg in 0 9% sodium chloride 100 mL IVPB  Intravenous Daily   nicotine 1 patch Transdermal Daily     Continuous IV Infusions:  dextrose 5 % infusion   Rate: 150 mL/hr Dose: 150 mL/hr  Freq: Continuous Route: IV  Indications of Use: IV Hydration  Last Dose: Stopped (11/01/19 2113)  Start: 11/01/19 0830 End: 11/01/19 2006      sodium chloride 0 9 % infusion   Rate: 75 mL/hr Dose: 75 mL/hr  Freq: Continuous Route: IV  Indications of Use: IV Hydration  Last Dose: Stopped (11/01/19 0831)  Start: 10/31/19 1730 End: 11/01/19 0824           PRN Meds:  acetaminophen 975 mg Oral Q6H PRN x6   albuterol 2 puff Inhalation Q6H PRN x4       IP CONSULT TO ED CRISIS WORKER  IP CONSULT TO PSYCHIATRY  IP CONSULT TO CASE MANAGEMENT  IP CONSULT TO WOUND CARE  IP CONSULT TO PODIATRY    Network Utilization Review Department  [email or web address]  ATTENTION: Please call with any questions or concerns to [phone] and carefully listen to the prompts so that you are directed to the right person  All voicemails are confidential   [place] [place] [employer] all requests for admission clinical reviews, approved or denied determinations and any other requests to dedicated fax number below belonging to the campus where the patient is receiving treatment    FACILITY NAME UR FAX NUMBER   ADMISSION DENIALS (Administrative/Medical Necessity) [address] [facility] (Maternity/NICU/Pediatrics) [phone]   [facility] [address] [facility] [phone]   [facility] [address] [facility] [phone]   Lise Pérez [address] [phone]

## 2019-11-04 LAB
ALBUMIN SERPL BCP-MCNC: 3.3 G/DL (ref 3.5–5)
ALP SERPL-CCNC: 93 U/L (ref 46–116)
ALT SERPL W P-5'-P-CCNC: 74 U/L (ref 12–78)
ANION GAP SERPL CALCULATED.3IONS-SCNC: 3 MMOL/L (ref 4–13)
AST SERPL W P-5'-P-CCNC: 69 U/L (ref 5–45)
BILIRUB SERPL-MCNC: 0.44 MG/DL (ref 0.2–1)
BUN SERPL-MCNC: 9 MG/DL (ref 5–25)
CALCIUM SERPL-MCNC: 8.9 MG/DL (ref 8.3–10.1)
CHLORIDE SERPL-SCNC: 108 MMOL/L (ref 100–108)
CO2 SERPL-SCNC: 30 MMOL/L (ref 21–32)
CREAT SERPL-MCNC: 0.74 MG/DL (ref 0.6–1.3)
GFR SERPL CREATININE-BSD FRML MDRD: 104 ML/MIN/1.73SQ M
GLUCOSE SERPL-MCNC: 81 MG/DL (ref 65–140)
POTASSIUM SERPL-SCNC: 4.2 MMOL/L (ref 3.5–5.3)
PROT SERPL-MCNC: 6.3 G/DL (ref 6.4–8.2)
SODIUM SERPL-SCNC: 141 MMOL/L (ref 136–145)

## 2019-11-04 PROCEDURE — G8988 SELF CARE GOAL STATUS: HCPCS

## 2019-11-04 PROCEDURE — 99232 SBSQ HOSP IP/OBS MODERATE 35: CPT | Performed by: HOSPITALIST

## 2019-11-04 PROCEDURE — G8989 SELF CARE D/C STATUS: HCPCS

## 2019-11-04 PROCEDURE — 80053 COMPREHEN METABOLIC PANEL: CPT | Performed by: INTERNAL MEDICINE

## 2019-11-04 PROCEDURE — G8987 SELF CARE CURRENT STATUS: HCPCS

## 2019-11-04 PROCEDURE — 97166 OT EVAL MOD COMPLEX 45 MIN: CPT

## 2019-11-04 RX ORDER — DIAZEPAM 5 MG/1
10 TABLET ORAL EVERY 12 HOURS
Status: DISCONTINUED | OUTPATIENT
Start: 2019-11-04 | End: 2019-11-07

## 2019-11-04 RX ORDER — HYDROXYZINE HYDROCHLORIDE 25 MG/1
25 TABLET, FILM COATED ORAL ONCE
Status: COMPLETED | OUTPATIENT
Start: 2019-11-04 | End: 2019-11-04

## 2019-11-04 RX ORDER — CLONIDINE HYDROCHLORIDE 0.1 MG/1
0.1 TABLET ORAL 2 TIMES DAILY
Status: DISCONTINUED | OUTPATIENT
Start: 2019-11-04 | End: 2019-11-05

## 2019-11-04 RX ORDER — HYDRALAZINE HYDROCHLORIDE 20 MG/ML
10 INJECTION INTRAMUSCULAR; INTRAVENOUS EVERY 6 HOURS PRN
Status: DISCONTINUED | OUTPATIENT
Start: 2019-11-04 | End: 2019-11-07 | Stop reason: HOSPADM

## 2019-11-04 RX ADMIN — CLONIDINE HYDROCHLORIDE 0.1 MG: 0.1 TABLET ORAL at 17:29

## 2019-11-04 RX ADMIN — THIAMINE HYDROCHLORIDE: 100 INJECTION, SOLUTION INTRAMUSCULAR; INTRAVENOUS at 08:36

## 2019-11-04 RX ADMIN — ACETAMINOPHEN 975 MG: 325 TABLET ORAL at 20:57

## 2019-11-04 RX ADMIN — ACETAMINOPHEN 975 MG: 325 TABLET ORAL at 04:08

## 2019-11-04 RX ADMIN — HYDROXYZINE HYDROCHLORIDE 25 MG: 25 TABLET ORAL at 14:24

## 2019-11-04 RX ADMIN — CLONIDINE HYDROCHLORIDE 0.1 MG: 0.1 TABLET ORAL at 08:36

## 2019-11-04 RX ADMIN — DIAZEPAM 10 MG: 5 TABLET ORAL at 17:29

## 2019-11-04 RX ADMIN — NICOTINE 1 PATCH: 21 PATCH, EXTENDED RELEASE TRANSDERMAL at 08:36

## 2019-11-04 RX ADMIN — DIAZEPAM 10 MG: 5 TABLET ORAL at 05:12

## 2019-11-04 RX ADMIN — ACETAMINOPHEN 975 MG: 325 TABLET ORAL at 14:27

## 2019-11-04 NOTE — PROGRESS NOTES
11/04/19 1200   Psychosocial   Patient Behaviors/Mood Anxious   Plan of Care   Comments Cultivated a relationship of care and support but pt declined  support     Assessment Completed by: Unit visit

## 2019-11-04 NOTE — PROGRESS NOTES
Almost as soon as patient was taken off of 1:1 he had become anxious and tried to elope to have a cigarette    Pt placed back on 1:1   Paged SOD for something to help ease pt's anxiety

## 2019-11-04 NOTE — SOCIAL WORK
Cm reviewed patient during care coordination rounds  Patient not stable for discharge at this time  Podiatry following for local wound care  Patient does not meet inpatient psychiatric criteria  1 to 1 was discontinued but then re-consulted for safety/elopement risk  Patient being monitored for CIWA protocol  Patient does not have capacity per documentation provided by patient's sister Nelson Valenzuela is patient's POA and legal Guardian)  Patient was living with roommate PTA but patient's roommate is not currently at the house (he is currently at a SNF for further care)  Patient has been abusing alcohol by taking $194 of monthly food stamps to the grocery store to buy cooking alcohol that he then consumes; patient's roommate also has a history of alcohol abuse per patient's sister and brother in law  Due to concern for patient's current living arrangements and patient's sister maintaining proper responsibilities as patient's Juan Barrjer called [facility] and spoke with Angie Mcguire to file report  Angie Mcguire completed assessment and information will now go to [facility] for further investigation  Patient unable to return to prior living arrangements until investigation is completed  Cm sent OPTION paperwork to [facility] for review and for Level II PASRR to be completed  Discharge plan unknown at this time  Cm to follow-up with patient's sister regarding different placement options  [place] List emailed to patient's family for review on 11/1  Cm following

## 2019-11-04 NOTE — PROGRESS NOTES
INTERNAL MEDICINE RESIDENCY PROGRESS NOTE     Name: Joseph Wright   Age & Sex: 53 y o  male   MRN: [id number]  Unit/Bed#: [facility] 806-01   Encounter: [id number]  Team: SOD Team A    PATIENT INFORMATION     Name: Joseph Wright   Age & Sex: 53 y o  male   MRN: [id number]  Hospital Stay Days: 2    ASSESSMENT/PLAN     Principal Problem:    Alcohol dependence with uncomplicated withdrawal ([place] [address] )  Active Problems:    Nicotine dependence    COPD (chronic obstructive pulmonary disease) ([place] [address] )    Multiple open wounds of right lower extremity    Hypertension      Hypertension  Assessment & Plan  Likely secondary to withdrawal tachycardia  - Plan to wean from clonidine  Will give 0 1mg BID today and one time dose tomorrow  - hydralazine 10mg prn  - continue to monitor    Multiple open wounds of right lower extremity  Assessment & Plan  Patient endorsed walking long distances without socks and has multiple blisters  - wound care consulted recommended podiatry consult  - podiatry managing wounds at this time, do not feel they are infected    COPD (chronic obstructive pulmonary disease) ([place] [address] )  Assessment & Plan  Not in exacerbation  States daily albuterol use  Expiratory wheezes bilaterally  - continue albuterol prn    Nicotine dependence  Assessment & Plan  Tobacco abuse:  Counseled for cessation  -nicotine patch 21 mcg    * Alcohol dependence with uncomplicated withdrawal (HCC)  Assessment & Plan  Endorses daily EtOH use 3-4 beers and 3-4 shots of hard liquor daily, as well as history of withdrawal   FARRUKH 264 on admission   Patient currently denying withdrawal symptoms, though has continued to score on CIWA  - CIWA protocol  - Will start longer acting benzodiazepine 11/3 : now on diazepam q12hrs to be continued for 48-72 hrs  - IV thiamine   - Psychiatry consulted at the request of sister, does not meet inpatient criteria  - discontinue 1:1 observation    Hypokalemia-resolved as of 11/2/2019  Assessment & Plan  Likely secondary to poor PO intake  - Kdur 40mg x2 -> K currently 4 1  - continue to monitor     Hypernatremia-resolved as of 2019  Assessment & Plan  Likely secondary to volume depletion in setting of EtOH diuresis  Currently 142 following d5 infusion  - continue to monitor BMP    Alcohol withdrawal (HCC)-resolved as of 11/3/2019  Assessment & Plan  Endorses daily EtOH use 3-4 beers and 3-4 shots of hard liquor daily, as well as history of withdrawal   FARRUKH 264 on admission  Patient currently denying withdrawal symptoms  - [facility] protocol  - thiamine   - Psychiatry consulted at the request of sister, does not meet inpatient criteria      Disposition: continue inpatient    SUBJECTIVE     Patient seen and examined  No acute events overnight  Per CM patient's sister does not want him to return to his current residence  Patient wants input on where he will be going  He denies any withdrawal symptoms at this time  No headache, chest pain, or sob  OBJECTIVE     Vitals:    19 2308 19 0248 19 0701 19 0753   BP:  128/93 129/91    Pulse: 74 79 75    Resp: 20 20 20    Temp: (!) 97 3 °F (36 3 °C) (!) 97 3 °F (36 3 °C) 97 5 °F (36 4 °C)    TempSrc:       SpO2: 96% 96% 98% 98%   Weight:       Height:          Temperature:   Temp (24hrs), Av 5 °F (36 4 °C), Min:97 3 °F (36 3 °C), Max:97 8 °F (36 6 °C)    Temperature: 97 5 °F (36 4 °C)  Intake & Output:  I/O       10/30 07 - 10/31 0700 10/31 07 -  0700  07 -  0700    IV Piggyback  100     Total Intake  100     Urine  1750     Stool  0     Total Output  1750     Net  -1650            Unmeasured Stool Occurrence  0 x         Weights:   IBW: 79 9 kg    Body mass index is 23 09 kg/m²  Weight (last 2 days)     Date/Time   Weight    19 1525   79 4 (175)            Physical Exam   Constitutional: He is oriented to person, place, and time  He appears well-developed and well-nourished  No distress     HENT:   Head: Normocephalic and atraumatic  Eyes: Pupils are equal, round, and reactive to light  EOM are normal    Cardiovascular: Normal rate, regular rhythm and normal heart sounds  Exam reveals no friction rub  No murmur heard  Pulmonary/Chest: Effort normal  No stridor  No respiratory distress  He has wheezes  He has no rales  Diffuse expiratory wheezes throughout   Abdominal: Soft  Bowel sounds are normal  He exhibits no distension and no mass  There is no tenderness  There is no rebound and no guarding  Musculoskeletal: Normal range of motion  He exhibits no edema, tenderness or deformity  Neurological: He is alert and oriented to person, place, and time  No cranial nerve deficit  Skin: Skin is warm and dry  He is not diaphoretic  Feet with multiple erythematous vesicles with the most prominent on his dorsal side of his R foot   Vitals reviewed  LABORATORY DATA     Labs: I have personally reviewed pertinent reports  Results from last 7 days   Lab Units 10/31/19  1549   WBC Thousand/uL 4 60   HEMOGLOBIN g/dL 13 9   HEMATOCRIT % 41 2   PLATELETS Thousands/uL 241   NEUTROS PCT % 52   MONOS PCT % 7      Results from last 7 days   Lab Units 11/04/19  0454 11/02/19  0456 11/01/19  1454 10/31/19  1549   POTASSIUM mmol/L 4 2 4 1 3 7 3 3*   CHLORIDE mmol/L 108 109* 107 115*   CO2 mmol/L 30 26 27 25   BUN mg/dL 9 7 11 6   CREATININE mg/dL 0 74 0 69 0 70 0 78   CALCIUM mg/dL 8 9 8 6 8 6 8 1*   ALK PHOS U/L 93  --   --  91   ALT U/L 74  --   --  70   AST U/L 69*  --   --  184*                            IMAGING & DIAGNOSTIC TESTING     Radiology Results: I have personally reviewed pertinent reports  No results found  Other Diagnostic Testing: I have personally reviewed pertinent reports      ACTIVE MEDICATIONS     Current Facility-Administered Medications   Medication Dose Route Frequency    acetaminophen (TYLENOL) tablet 975 mg  975 mg Oral Q6H PRN    albuterol (PROVENTIL HFA,VENTOLIN HFA) inhaler 2 puff  2 puff Inhalation Q6H PRN    cloNIDine (CATAPRES) tablet 0 1 mg  0 1 mg Oral BID    diazepam (VALIUM) tablet 10 mg  10 mg Oral K83C    folic acid 1 mg, thiamine (VITAMIN B1) 100 mg in sodium chloride 0 9 % 100 mL IV piggyback   Intravenous Daily    hydrALAZINE (APRESOLINE) injection 10 mg  10 mg Intravenous Q6H PRN    nicotine (NICODERM CQ) 21 mg/24 hr TD 24 hr patch 1 patch  1 patch Transdermal Daily       VTE Pharmacologic Prophylaxis: Reason for no pharmacologic prophylaxis low risk  VTE Mechanical Prophylaxis: sequential compression device    Portions of the record may have been created with voice recognition software  Occasional wrong word or "sound a like" substitutions may have occurred due to the inherent limitations of voice recognition software    Read the chart carefully and recognize, using context, where substitutions have occurred   ==  Vadim Liu MD  [facility]  Internal Medicine Residency PGY-1

## 2019-11-04 NOTE — OCCUPATIONAL THERAPY NOTE
[address] Evaluation     Patient Name: Faith Chirinos  UWCGQ'N Date: 11/4/2019  Problem List  Principal Problem:    Alcohol dependence with uncomplicated withdrawal ([facility] )  Active Problems:    Nicotine dependence    COPD (chronic obstructive pulmonary disease) ([facility] )    Multiple open wounds of right lower extremity    Hypertension    Past Medical History  Past Medical History:   Diagnosis Date    COPD (chronic obstructive pulmonary disease) ([facility] )      Past Surgical History  History reviewed  No pertinent surgical history  11/04/19 4654   Note Type   Note type Eval only   Restrictions/Precautions   Weight Bearing Precautions Per Order Yes   LLE Weight Bearing Per Order WBAT   Braces or Orthoses   (per podiatry note)   Other Precautions 1:1;Cognitive; Chair Alarm; Bed Alarm;Telemetry;WBS   Pain Assessment   Pain Assessment No/denies pain   Pain Score No Pain   Home Living   Type of Home House   Home Layout Two level  (5 JENNIFER)   Bathroom Shower/Tub Tub/shower unit   Bathroom Toilet Standard   Bathroom Equipment   (Denies bathroom DME)   Bathroom Accessibility Accessible   Prior Function   Level of [place] Independent with ADLs and functional mobility   Lives With   (2 roomates)   Receives Help From   (sister is guardian, otherwise no family/friend support-per pt roommates can not assist with ADL's/IADL's )   ADL Assistance Independent   IADLs Independent   Falls in the last 6 months 0   Vocational Unemployed   Lifestyle   Autonomy I ADL's/IADL's, no AD with functional ambulation, (-) does not drive, pt reports walks to grocery store   Reciprocal Relationships sister is guardian   Service to Others 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   Psychosocial   Psychosocial (WDL) X   Patient Behaviors/Mood Anxious; Flat affect   Subjective   Subjective pt received with 1;1 present, agreeable to therapy   ADL   Where Assessed Chair   Eating Assistance 7  Independent   Grooming Assistance 7  Independent   UB Bathing Assistance 5  Supervision/Setup   UB Bathing Deficit Setup   LB Bathing Assistance 5  Supervision/Setup   LB Bathing Deficit Setup   UB Dressing Assistance 5  Supervision/Setup   LB Dressing Assistance 5  Supervision/Setup   LB Dressing Deficit Don/doff R sock; Don/doff L sock  (+crossed over leg method)   Toileting Assistance  5  Supervision/Setup   Additional Comments pt requires Supervision for safety   Bed Mobility   Additional Comments pt left in standing as received with 1;1 present   Transfers   Sit to Stand 5  Supervision   Additional items Assist x 1   Stand to Sit 5  Supervision   Additional items Assist x 1   Toilet transfer 5  Supervision   Additional items Standard toilet   Functional Mobility   Functional Mobility 5  Supervision   Additional Comments S without AD household distance, no LOB/unsteadiness-Superivsion for safety   Additional items   (no AD)   Balance   Static Sitting Normal   Dynamic Sitting Good   Static Standing Fair +   Dynamic Standing Fair   Ambulatory Fair   Activity Tolerance   Activity Tolerance Patient tolerated treatment well   Medical Staff Made Aware BRYAN greenfield   Nurse Made Aware RN cleared pt for therapy   RUE Assessment   RUE Assessment WFL   LUE Assessment   LUE Assessment WFL   Hand Function   Gross Motor Coordination Functional   Fine Motor Coordination Functional   Cognition   Overall Cognitive Status Impaired  (Deemed incompetent by  in 10/18, sister is guardian/POA)   Arousal/Participation Alert; Cooperative   Attention Attends with cues to redirect   Orientation Level Oriented X4   Memory Decreased short term memory;Decreased recall of recent events;Decreased recall of precautions   Following Commands Follows multistep commands with increased time or repetition   Comments Flat affect, fair historian,poor judgement/safety at baseline-with life style   Assessment   Limitation Decreased Safe judgement during ADL;Decreased cognition;Decreased high-level ADLs   Prognosis Fair   Assessment Pt is a 53 y o  male who was admitted to [facility] on 54/37/9993 found by police intoxicated pt reports for the last 2 weeks with Alcohol dependence with uncomplicated withdrawal (HCC)hypertensive, COPD, tobacco abuse, podiatry following for LLE wound care-WBAT per podiatry to LLE   Pt's problem list also includes PMH of ARF, alcohol dependence, nicotine dependence  At baseline pt does not have capacity -pt's sister is POA and legal guardian was completing ADL's  With I, no AD with functional ambulation  Pt lives in a [facility] with 2 roommates +5STE, no DME  Currently pt requires S for overall ADLS and S without AD  for functional mobility/transfers  Pt currently presents with impairments in the following categories -steps to enter environment, limited home support, behavioral pattern, difficulty performing ADLS, difficulty performing IADLS , compliance, flat affect and health management  activity tolerance  These impairments, as well as pt's fatigue, decreased caregiver support and risk for falls  limit pt's ability to safely engage in all baseline areas of occupation, includingbathing, dressing, toileting, functional mobility/transfers, community mobility, laundry , house maintenance, medication management, meal prep, cleaning, work/volunteer work , social participation  and leisure activities  From OT standpoint, recommend home with increased support/Supervision for safety-per chart review pt does not meet inpatient psych criteria, investigation underway with patients sister maintaining proper responsibilities as pt's guardian-pt can not return home until investigation completed upon D/C   No further acute OT needs indicated at this time - Recommend continued oob for meals, ambulation to/from BR, setup for self care tasks and mobility in hallway with nursing/restorative - d/c from caseload with above recommendations     Goals   Patient Goals "figure out where to go"   Recommendation   OT Discharge Recommendation Home with family support   OT - OK to Discharge Yes   Barthel Index   Feeding 10   Bathing 0   Grooming Score 5   Dressing Score 10   Bladder Score 10   Bowels Score 10   Toilet Use Score 10   Transfers (Bed/Chair) Score 10   Mobility (Level Surface) Score 10   Stairs Score 0   Barthel Index Score 75   Modified Alessandra Scale   Modified [place] Scale 3     Kristi Damon MOT, OTR/L

## 2019-11-04 NOTE — UTILIZATION REVIEW
Notification of Inpatient Admission/Inpatient Authorization Request   This is a Notification of Inpatient Admission for [address]  Be advised that this patient was admitted to our facility under Inpatient Status  Please contact the Stalin Anders at [phone] for additional admission information  Patient Name:   Willa Mahmood   YOB: 1965       [address]   [address]:   [place]  Tax ID: [id number]  NPI: [id number] Attending Provider/NPI: Tod Rinne, Md [[id number]]   Place of Service Code: 24     Place of Service Name:  [address] [place] [address]   Start Date: 11/2/19 1707     Discharge Date & Time: No discharge date for patient encounter  Type of Admission: Inpatient Status Discharge Disposition (if discharged): Home/Self Care   Patient Diagnoses: Alcohol intoxication ([facility] ) [F10 929]  Chronic alcohol abuse [F10 10]  Wounds, multiple open, lower extremity, unspecified laterality, initial encounter [W23 646A]     Orders: Admission Orders (From admission, onward)     Ordered        11/02/19 1707  Inpatient Admission  Once         10/31/19 1420  Place in Observation  Once                    Assigned Utilization Review Contact: Stalin Anders  Utilization   Network Utilization Review Department  Phone: [phone]; Fax [phone]  Email: [email or web address]   ATTENTION PAYERS: Please call the assigned Utilization  directly with any questions or concerns ALL voicemails in the department are confidential  Send all requests for admission clinical reviews, approved or denied determinations and any other requests to dedicated fax number belonging to the campus where the patient is receiving treatment

## 2019-11-04 NOTE — PROGRESS NOTES
11/04/19 1200   Clinical Encounter Type   Visited With Patient; Health care provider   Routine Visit Introduction   Referral From Nurse

## 2019-11-05 PROCEDURE — G8978 MOBILITY CURRENT STATUS: HCPCS

## 2019-11-05 PROCEDURE — 97162 PT EVAL MOD COMPLEX 30 MIN: CPT

## 2019-11-05 PROCEDURE — G8979 MOBILITY GOAL STATUS: HCPCS

## 2019-11-05 PROCEDURE — 99232 SBSQ HOSP IP/OBS MODERATE 35: CPT | Performed by: HOSPITALIST

## 2019-11-05 PROCEDURE — G8980 MOBILITY D/C STATUS: HCPCS

## 2019-11-05 RX ORDER — CALCIUM CARBONATE 200(500)MG
500 TABLET,CHEWABLE ORAL DAILY PRN
Status: DISCONTINUED | OUTPATIENT
Start: 2019-11-05 | End: 2019-11-07 | Stop reason: HOSPADM

## 2019-11-05 RX ORDER — KETOROLAC TROMETHAMINE 10 MG/1
10 TABLET, FILM COATED ORAL ONCE
Status: COMPLETED | OUTPATIENT
Start: 2019-11-05 | End: 2019-11-05

## 2019-11-05 RX ORDER — CLONIDINE HYDROCHLORIDE 0.1 MG/1
0.1 TABLET ORAL ONCE
Status: COMPLETED | OUTPATIENT
Start: 2019-11-05 | End: 2019-11-05

## 2019-11-05 RX ADMIN — NICOTINE 1 PATCH: 21 PATCH, EXTENDED RELEASE TRANSDERMAL at 09:31

## 2019-11-05 RX ADMIN — DIAZEPAM 10 MG: 5 TABLET ORAL at 05:33

## 2019-11-05 RX ADMIN — ALBUTEROL SULFATE 2 PUFF: 90 AEROSOL, METERED RESPIRATORY (INHALATION) at 06:07

## 2019-11-05 RX ADMIN — ACETAMINOPHEN 975 MG: 325 TABLET ORAL at 15:56

## 2019-11-05 RX ADMIN — THIAMINE HYDROCHLORIDE: 100 INJECTION, SOLUTION INTRAMUSCULAR; INTRAVENOUS at 09:32

## 2019-11-05 RX ADMIN — KETOROLAC TROMETHAMINE 10 MG: 10 TABLET, FILM COATED ORAL at 20:07

## 2019-11-05 RX ADMIN — CLONIDINE HYDROCHLORIDE 0.1 MG: 0.1 TABLET ORAL at 07:07

## 2019-11-05 RX ADMIN — CALCIUM CARBONATE (ANTACID) CHEW TAB 500 MG 500 MG: 500 CHEW TAB at 15:56

## 2019-11-05 RX ADMIN — DIAZEPAM 10 MG: 5 TABLET ORAL at 18:18

## 2019-11-05 RX ADMIN — ACETAMINOPHEN 975 MG: 325 TABLET ORAL at 05:02

## 2019-11-05 NOTE — PHYSICAL THERAPY NOTE
Physical Therapy Evaluation     Patient's Name: Rosio Wu    Admitting Diagnosis  Alcohol intoxication ([facility] 75 ) [F10 929]  Chronic alcohol abuse [F10 10]  Wounds, multiple open, lower extremity, unspecified laterality, initial encounter [S81 276T]    Problem List  Patient Active Problem List   Diagnosis    Nicotine dependence    COPD (chronic obstructive pulmonary disease) ([facility] )    Acute respiratory failure with hypoxia ([place] [address] )    Alcohol dependence with uncomplicated withdrawal ([place] [address] )    Multiple open wounds of right lower extremity    Hypertension       Past Medical History  Past Medical History:   Diagnosis Date    COPD (chronic obstructive pulmonary disease) ([place] [address] )        Past Surgical History  History reviewed  No pertinent surgical history  11/05/19 1035   Note Type   Note type Eval only   Pain Assessment   Pain Assessment No/denies pain   Pain Score No Pain   Home Living   Type of Home House   Home Layout Two level;Stairs to enter with rails  (5 JENNIFER)   Bathroom Shower/Tub Tub/shower unit   Bathroom Toilet Standard   Bathroom Accessibility Accessible   Additional Comments PER CHART REVIEW PATIENT NORMALLY RESIDES WITH A ROOMMATE HOWEVER ROOMMATE IS NOT CURRENTLY IN THE HOUSE (AT SNF FOR FURTHER CARE)  Prior Function   Level of [place] Independent with ADLs and functional mobility   Lives With Other (Comment); Alone  (ROOMMATE NOT CURRENTLY RESIDING WITH PATIENT)   ADL Assistance Independent   IADLs Independent   Falls in the last 6 months   (+ ("WHEN IM DRINKING")   Vocational Unemployed   Restrictions/Precautions   Other Precautions 1:1;Cognitive   General   Family/Caregiver Present No   Cognition   Overall Cognitive Status Impaired   Attention Attends with cues to redirect   Orientation Level Oriented to person;Oriented to place;Oriented to time   Memory Decreased recall of recent events;Decreased recall of precautions   Following Commands Follows multistep commands with increased time or repetition   Comments PER CHART REVIEW, SISTER IS LEGAL GUARDIAN AND PATIENT DOES NOT HAVE CAPACITY    RUE Assessment   RUE Assessment WFL   LUE Assessment   LUE Assessment WFL   RLE Assessment   RLE Assessment WFL   LLE Assessment   LLE Assessment WFL   Bed Mobility   Supine to Sit Unable to assess   Sit to Supine Unable to assess   Transfers   Sit to Stand 5  Supervision   Stand to Sit 5  Supervision   Ambulation/Elevation   Gait pattern WNL   Gait Assistance 5  Supervision   Assistive Device None   Distance 260 FEET   Stair Management Assistance Not tested   Additional items   (DO NOT ANTICIPATE DIFFICULTY; DID NOT TRIAL (ELOPEMENT RISK))   Balance   Static Sitting Normal   Static Standing Good   Ambulatory Good   Endurance Deficit   Endurance Deficit No   Activity Tolerance   Activity Tolerance Patient tolerated treatment well   Nurse Made Aware ROS TO SEE PER RN    Assessment   Prognosis Fair   Problem List Decreased cognition; Impaired judgement;Decreased safety awareness   Assessment PT COMPLETED EVALUATION OF 53YEAR OLD MALE ADMITTED TO [facility] ON 10/31/19 AFTER BEING BROUGHT IN BY POLICE (FOUND INTOXICATED STUMBLING AROUND)  PER CHART REVIEW, FAMILY REPORTS PATIENT WAS DECLARED INCOMPETENT BY THE STATE AND HIS SISTER HAS BEEN NAMED HIS LEGAL GUARDIAN  CURRENT DIAGNOSES INCLUDE ALCOHOL DEPENDENCE WITH UNCOMPLICATED WITHDRAWAL, HTN (LIKELY SECONDARY TO WITHDRAWAL TACHYCARDIA) AND MULTIPLE OPEN WOUND SOF R LE  CURRENT EMERGING STATUS INCLUDES 1:1 (ELOPEMENT RISK) AND ONGOING MONITORING OF VITAL SIGNS  PMH IS SIGNIFICANT FOR COPD, ETOH ABUSE, AND LE ULCERATIONS/WOUNDS (WALKS MILES BAREFOOT)  PER CHART REVIEW PATIENT NORMALLY RESIDES WITH A ROOMMATE HOWEVER ROOMMATE IS NOT CURRENTLY IN THE HOUSE (AT SNF FOR FURTHER CARE)  DURING PT EVALUATION PATIENT PERFORMED SIT<-->STAND TRANSFER AND AMBULATION AT A S LEVEL (S REQUIRED FOR PATIENT SAFETY SECONDARY TO ELOPEMENT RISK)   HE DID NOT DEMONSTRATE LOB AND PRESENTED WITH NORMAL GAIT SPEED  ANTICIPATE THIS PATIENT WILL REQUIRE 24 HOUR SUPERVISION UPON D/C GIVEN HIS H/O BEING DEEMED NOT TO HAVE CAPACITY AND CURRENT COGNITIVE DEFICITS  HE DOES NOT REQUIRE INPT REHAB AT THIS TIME AND PT WILL SIGN OFF  RECOMMEND CONTINUED MOBILITY THIS ADMISSION WITH S OF STAFF  D/C INPT PT      Goals   Patient Goals TO KNOW WHEN HE CAN [address]    PT Treatment Day 0   Recommendation   Recommendation   (TO ENVT WITH S (ANTICIPATE PATIENT REQUIRES 24 HOUR S) )   Equipment Recommended   (NONE)   PT - OK to Discharge Yes  (TO ENVT WITH S (ANTICIPATE PATIENT REQUIRES 24 HOUR S) )   Barthel Index   Feeding 10   Bathing 0   Grooming Score 5   Dressing Score 10   Bladder Score 10   Bowels Score 10   Toilet Use Score 10   Transfers (Bed/Chair) Score 15   Mobility (Level Surface) Score 10   Stairs Score 0   Barthel Index Score 80         Gunner Beal, PT

## 2019-11-05 NOTE — SOCIAL WORK
Cm reviewed patient during care coordination rounds  Patient not able to discharge due to placement  Awaiting [facility] to assess patient; all OPTION paperwork faxed to [facility] yesterday  Referrals placed to dual eligible facilities in [address] [place] [address] per patient's family's request (patient's brother-in-law Gaby Hernandez)  Awaiting determinations  Therapy to evaluate patient (awaiting OT cognitive evaluation/recommendations)  SOD-A to place OT order  Patient does not have capacity; patient's sister Mary Ramírez is Guardian  [facility] reported filed yesterday due to concern for Guardianship Responsibilities being followed through to assist patient in decision-making as he lacks capacity  Patient unable to return to prior living arrangements until investigation is completed through [employer]  Cm following

## 2019-11-05 NOTE — PLAN OF CARE
Problem: Prexisting or High Potential for Compromised Skin Integrity  Goal: Skin integrity is maintained or improved  Description  INTERVENTIONS:  - Identify patients at risk for skin breakdown  - Assess and monitor skin integrity  - Assess and monitor nutrition and hydration status  - Monitor labs   - Assess for incontinence   - Turn and reposition patient  - Assist with mobility/ambulation  - Relieve pressure over bony prominences  - Avoid friction and shearing  - Provide appropriate hygiene as needed including keeping skin clean and dry  - Evaluate need for skin moisturizer/barrier cream  - Collaborate with interdisciplinary team   - Patient/family teaching  - Consider wound care consult   Outcome: Progressing     Problem: PAIN - ADULT  Goal: Verbalizes/displays adequate comfort level or baseline comfort level  Description  Interventions:  - Encourage patient to monitor pain and request assistance  - Assess pain using appropriate pain scale  - Administer analgesics based on type and severity of pain and evaluate response  - Implement non-pharmacological measures as appropriate and evaluate response  - Consider cultural and social influences on pain and pain management  - Notify physician/advanced practitioner if interventions unsuccessful or patient reports new pain  Outcome: Progressing     Problem: SAFETY ADULT  Goal: Patient will remain free of falls  Description  INTERVENTIONS:  - Assess patient frequently for physical needs  -  Identify cognitive and physical deficits and behaviors that affect risk of falls    -  [place] fall precautions as indicated by assessment   - Educate patient/family on patient safety including physical limitations  - Instruct patient to call for assistance with activity based on assessment  - Modify environment to reduce risk of injury  - Consider OT/PT consult to assist with strengthening/mobility  Outcome: Progressing  Goal: Maintain or return to baseline ADL function  Description  INTERVENTIONS:  -  Assess patient's ability to carry out ADLs; assess patient's baseline for ADL function and identify physical deficits which impact ability to perform ADLs (bathing, care of mouth/teeth, toileting, grooming, dressing, etc )  - Assess/evaluate cause of self-care deficits   - Assess range of motion  - Assess patient's mobility; develop plan if impaired  - Assess patient's need for assistive devices and provide as appropriate  - Encourage maximum independence but intervene and supervise when necessary  - Involve family in performance of ADLs  - Assess for home care needs following discharge   - Consider OT consult to assist with ADL evaluation and planning for discharge  - Provide patient education as appropriate  Outcome: Progressing  Goal: Maintain or return mobility status to optimal level  Description  INTERVENTIONS:  - Assess patient's baseline mobility status (ambulation, transfers, stairs, etc )    - Identify cognitive and physical deficits and behaviors that affect mobility  - Identify mobility aids required to assist with transfers and/or ambulation (gait belt, sit-to-stand, lift, walker, cane, etc )  - [place] fall precautions as indicated by assessment  - Record patient progress and toleration of activity level on Mobility SBAR; progress patient to next Phase/Stage  - Instruct patient to call for assistance with activity based on assessment  - Consider rehabilitation consult to assist with strengthening/weightbearing, etc   Outcome: Progressing     Problem: DISCHARGE PLANNING  Goal: Discharge to home or other facility with appropriate resources  Description  INTERVENTIONS:  - Identify barriers to discharge w/patient and caregiver  - Arrange for needed discharge resources and transportation as appropriate  - Identify discharge learning needs (meds, wound care, etc )  - Arrange for interpretive services to assist at discharge as needed  - Refer to Case Management Department for coordinating discharge planning if the patient needs post-hospital services based on physician/advanced practitioner order or complex needs related to functional status, cognitive ability, or social support system  Outcome: Progressing     Problem: Knowledge Deficit  Goal: Patient/family/caregiver demonstrates understanding of disease process, treatment plan, medications, and discharge instructions  Description  Complete learning assessment and assess knowledge base    Interventions:  - Provide teaching at level of understanding  - Provide teaching via preferred learning methods  Outcome: Progressing

## 2019-11-05 NOTE — PROGRESS NOTES
INTERNAL MEDICINE RESIDENCY PROGRESS NOTE     Name: Christa Jaimes   Age & Sex: 53 y o  male   MRN: [id number]  Unit/Bed#: [facility] 806-01   Encounter: [id number]  Team: SOD Team A    PATIENT INFORMATION     Name: Christa Jaimes   Age & Sex: 53 y o  male   MRN: [id number]  Hospital Stay Days: 3    ASSESSMENT/PLAN     Principal Problem:    Alcohol dependence with uncomplicated withdrawal ([place] [address] )  Active Problems:    Nicotine dependence    COPD (chronic obstructive pulmonary disease) ([place] [address] )    Multiple open wounds of right lower extremity    Hypertension      Hypertension  Assessment & Plan  Likely secondary to withdrawal tachycardia  - Plan to wean from clonidine  Will give 0 1mg today and then discontinue  - hydralazine 10mg prn  - continue to monitor    Multiple open wounds of right lower extremity  Assessment & Plan  Patient endorsed walking long distances without socks and has multiple blisters  - wound care consulted recommended podiatry consult  - podiatry managing wounds at this time, do not feel they are infected    COPD (chronic obstructive pulmonary disease) ([place] [address] )  Assessment & Plan  Not in exacerbation  States daily albuterol use  - continue albuterol prn    Nicotine dependence  Assessment & Plan  Tobacco abuse:  Counseled for cessation  -nicotine patch 21 mcg    * Alcohol dependence with uncomplicated withdrawal (HCC)  Assessment & Plan  Endorses daily EtOH use 3-4 beers and 3-4 shots of hard liquor daily, as well as history of withdrawal   FARRUKH 264 on admission  Patient currently denying withdrawal symptoms, though has continued to score on CIWA  - CIWA protocol  - Will start longer acting benzodiazepine 11/3 : now on diazepam q12hrs to be continued for 48-72 hrs  - IV thiamine   - Psychiatry consulted at the request of sister, does not meet inpatient criteria    Hypokalemia-resolved as of 11/2/2019  Assessment & Plan  Likely secondary to poor PO intake    - Kdur 40mg x2 -> K currently 4 1  - continue to monitor     Hypernatremia-resolved as of 2019  Assessment & Plan  Likely secondary to volume depletion in setting of EtOH diuresis  Currently 142 following d5 infusion  - continue to monitor BMP    Alcohol withdrawal (HCC)-resolved as of 11/3/2019  Assessment & Plan  Endorses daily EtOH use 3-4 beers and 3-4 shots of hard liquor daily, as well as history of withdrawal   FARRUKH 264 on admission  Patient currently denying withdrawal symptoms  - [facility] protocol  - thiamine   - Psychiatry consulted at the request of sister, does not meet inpatient criteria      Disposition: medically stable for discharge, pending placement    SUBJECTIVE     Patient seen and examined  No acute events overnight  Spoke at length with the patient he was unaware that he had an upcoming court date for failing to pay rent  He said his rent should be paid our of his trust established by his inheritance from his mom  He is becoming increasingly frustrated that he is unable to leave  No headache, chest pain, or sob  OBJECTIVE     Vitals:    19 2356 19 0321 19 0657   BP: 124/87 124/86 124/85 124/84   Pulse:  70 68 76   Resp: 18 18 18 20   Temp: (!) 97 4 °F (36 3 °C) (!) 97 3 °F (36 3 °C) (!) 97 4 °F (36 3 °C) (!) 97 2 °F (36 2 °C)   TempSrc: Oral      SpO2:  97% 97% 97%   Weight:       Height:          Temperature:   Temp (24hrs), Av 4 °F (36 3 °C), Min:97 °F (36 1 °C), Max:97 8 °F (36 6 °C)    Temperature: (!) 97 2 °F (36 2 °C)  Intake & Output:  I/O       10/30 0701 - 10/31 0700 10/31 07 -  0700  07 -  0700    IV Piggyback  100     Total Intake  100     Urine  1750     Stool  0     Total Output  1750     Net  -1650            Unmeasured Stool Occurrence  0 x         Weights:   IBW: 79 9 kg    Body mass index is 23 09 kg/m²  Weight (last 2 days)     None        Physical Exam   Constitutional: He is oriented to person, place, and time   He appears well-developed and well-nourished  No distress  HENT:   Head: Normocephalic and atraumatic  Eyes: Pupils are equal, round, and reactive to light  EOM are normal    Cardiovascular: Normal rate, regular rhythm and normal heart sounds  Exam reveals no friction rub  No murmur heard  Pulmonary/Chest: Effort normal and breath sounds normal  No stridor  No respiratory distress  He has no wheezes  He has no rales  Abdominal: Soft  Bowel sounds are normal  He exhibits no distension and no mass  There is no tenderness  There is no rebound and no guarding  Musculoskeletal: Normal range of motion  He exhibits no edema, tenderness or deformity  Neurological: He is alert and oriented to person, place, and time  No cranial nerve deficit  Skin: Skin is warm and dry  He is not diaphoretic  Feet with multiple healing erythematous vesicles with the most prominent on his dorsal side of his R foot   Vitals reviewed  LABORATORY DATA     Labs: I have personally reviewed pertinent reports  Results from last 7 days   Lab Units 10/31/19  1549   WBC Thousand/uL 4 60   HEMOGLOBIN g/dL 13 9   HEMATOCRIT % 41 2   PLATELETS Thousands/uL 241   NEUTROS PCT % 52   MONOS PCT % 7      Results from last 7 days   Lab Units 11/04/19  0454 11/02/19  0456 11/01/19  1454 10/31/19  1549   POTASSIUM mmol/L 4 2 4 1 3 7 3 3*   CHLORIDE mmol/L 108 109* 107 115*   CO2 mmol/L 30 26 27 25   BUN mg/dL 9 7 11 6   CREATININE mg/dL 0 74 0 69 0 70 0 78   CALCIUM mg/dL 8 9 8 6 8 6 8 1*   ALK PHOS U/L 93  --   --  91   ALT U/L 74  --   --  70   AST U/L 69*  --   --  184*                            IMAGING & DIAGNOSTIC TESTING     Radiology Results: I have personally reviewed pertinent reports  No results found  Other Diagnostic Testing: I have personally reviewed pertinent reports      ACTIVE MEDICATIONS     Current Facility-Administered Medications   Medication Dose Route Frequency    acetaminophen (TYLENOL) tablet 975 mg  975 mg Oral Q6H PRN    albuterol (PROVENTIL HFA,VENTOLIN HFA) inhaler 2 puff  2 puff Inhalation Q6H PRN    diazepam (VALIUM) tablet 10 mg  10 mg Oral E32Y    folic acid 1 mg, thiamine (VITAMIN B1) 100 mg in sodium chloride 0 9 % 100 mL IV piggyback   Intravenous Daily    hydrALAZINE (APRESOLINE) injection 10 mg  10 mg Intravenous Q6H PRN    nicotine (NICODERM CQ) 21 mg/24 hr TD 24 hr patch 1 patch  1 patch Transdermal Daily       VTE Pharmacologic Prophylaxis: Reason for no pharmacologic prophylaxis low risk  VTE Mechanical Prophylaxis: sequential compression device    Portions of the record may have been created with voice recognition software  Occasional wrong word or "sound a like" substitutions may have occurred due to the inherent limitations of voice recognition software    Read the chart carefully and recognize, using context, where substitutions have occurred   ==  Cindy Calles MD  [facility]  Internal Medicine Residency PGY-1

## 2019-11-06 PROCEDURE — 97168 OT RE-EVAL EST PLAN CARE: CPT

## 2019-11-06 PROCEDURE — 97535 SELF CARE MNGMENT TRAINING: CPT

## 2019-11-06 PROCEDURE — 99232 SBSQ HOSP IP/OBS MODERATE 35: CPT | Performed by: HOSPITALIST

## 2019-11-06 RX ORDER — HYDROXYZINE HYDROCHLORIDE 25 MG/1
25 TABLET, FILM COATED ORAL ONCE
Status: COMPLETED | OUTPATIENT
Start: 2019-11-06 | End: 2019-11-06

## 2019-11-06 RX ADMIN — DIAZEPAM 10 MG: 5 TABLET ORAL at 18:18

## 2019-11-06 RX ADMIN — HYDROXYZINE HYDROCHLORIDE 25 MG: 25 TABLET ORAL at 13:25

## 2019-11-06 RX ADMIN — ACETAMINOPHEN 975 MG: 325 TABLET ORAL at 05:57

## 2019-11-06 RX ADMIN — CALCIUM CARBONATE (ANTACID) CHEW TAB 500 MG 500 MG: 500 CHEW TAB at 10:20

## 2019-11-06 RX ADMIN — DIAZEPAM 10 MG: 5 TABLET ORAL at 05:53

## 2019-11-06 RX ADMIN — NICOTINE 1 PATCH: 21 PATCH, EXTENDED RELEASE TRANSDERMAL at 10:04

## 2019-11-06 RX ADMIN — ACETAMINOPHEN 975 MG: 325 TABLET ORAL at 15:07

## 2019-11-06 RX ADMIN — THIAMINE HYDROCHLORIDE: 100 INJECTION, SOLUTION INTRAMUSCULAR; INTRAVENOUS at 10:05

## 2019-11-06 NOTE — PLAN OF CARE
Problem: OCCUPATIONAL THERAPY ADULT  Goal: Performs self-care activities at highest level of function for planned discharge setting  See evaluation for individualized goals  Description  Treatment Interventions: ADL retraining, Functional transfer training, Patient/family training, Continued evaluation          See flowsheet documentation for full assessment, interventions and recommendations  Note:   Limitation: Decreased ADL status, Decreased endurance, Decreased Safe judgement during ADL, Decreased self-care trans, Decreased high-level ADLs, Decreased cognition  Prognosis: Fair  Assessment: Pt is a 53 y o  male who was [facility] on 10/31/2019 with Alcohol dependence with uncomplicated withdrawal (HCC) , HTN, multiple open wounds of RLE, COPD, nicotine dependence, hypernatremia  Pt's problem list also includes PMH of see initial evaluation for full past medical history  At baseline pt was completing ADL's/IADL's with I, no AD with functional ambulation, does not drive/work  Pt lives with roommate  Currently pt requires S/set-up for overall ADLS and S/mod I without AD for functional mobility/transfers  Pt currently presents with impairments in the following categories -steps to enter environment, limited home support, difficulty performing IADLS , compliance, health management  and environment activity tolerance, safety , judgement  and coping skills problem solving  These impairments, as well as pt's fatigue, WBS , impulsivity, decreased caregiver support, risk for falls and home environment  limit pt's ability to safely engage in all baseline areas of occupation, includingcommunity mobility, laundry , house maintenance, medication management, meal prep, cleaning, social participation  and leisure activities  From OT standpoint, recommend home with daily checks upon D/C  OT will continue to follow to address the below stated goals        OT Discharge Recommendation: Home with daily checks  OT - OK to Discharge:  Yes

## 2019-11-06 NOTE — OCCUPATIONAL THERAPY NOTE
OccupationalTherapy Re-Evaluation/Progress Note     Patient Name: Rejiad Riding  TDSZS'L Date: 11/6/2019  Problem List  Principal Problem:    Alcohol dependence with uncomplicated withdrawal (RUST 75 )  Active Problems:    Nicotine dependence    COPD (chronic obstructive pulmonary disease) (R[facility] 75 )    Multiple open wounds of right lower extremity    Hypertension    Past Medical History  Past Medical History:   Diagnosis Date    COPD (chronic obstructive pulmonary disease) (RUST 75 )      Past Surgical History  History reviewed  No pertinent surgical history  11/06/19 1355   Note Type   Note type Re-eval   Restrictions/Precautions   Weight Bearing Precautions Per Order Yes   LLE Weight Bearing Per Order WBAT   Other Precautions 1:1;Cognitive; Chair Alarm; Bed Alarm;Telemetry; Fall Risk;WBS   Pain Assessment   Pain Assessment No/denies pain   Pain Score No Pain   Home Living   Type [address] Two level;Stairs to enter with rails   Bathroom Shower/Tub Tub/shower unit   Bathroom Toilet Standard   Bathroom Equipment   (pt denies DME)   Bathroom Accessibility Accessible   Prior Function   Level of [place] Independent with ADLs and functional mobility   Lives With   (+roommate -who is currently in a SNF))   Receives Help From   (+sister (guardian/POA) -not supportive per pt, roommate)   ADL Assistance Independent   IADLs Independent   Falls in the last 6 months 1 to 4   Vocational Unemployed   Lifestyle   Autonomy I ADL's/IADL's, no AD with functional ambulation, (-) does not drive, pt reports walks to grocery store   Reciprocal Relationships sister is guardian   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   Psychosocial   Psychosocial (WDL) WDL   Patient Behaviors/Mood [place] affect; Anxious   Subjective   Subjective pt received with 1:1 present and agreeable to ACLS testing    ADL   Where Assessed Chair   Eating Assistance 7 Independent   Grooming Assistance 7  Independent   UB Bathing Assistance 5  Supervision/Setup   UB Bathing Deficit Setup   LB Bathing Assistance 5  Supervision/Setup   LB Bathing Deficit Setup   UB Dressing Assistance 5  Supervision/Setup   UB Dressing Deficit Setup   LB Dressing Assistance 5  Supervision/Setup   LB Dressing Deficit Setup   Toileting Assistance  5  Supervision/Setup   Toileting Deficit Setup   Additional Comments pt requires Superivison 2* to 1:1 present/safety   Bed Mobility   Additional Comments pt left as received standing in room with 1;1 present   Transfers   Sit to Stand 5  Supervision   Additional items Assist x 1   Stand to Sit 5  Supervision   Additional items Assist x 1   Toilet transfer 6  Modified independent   Additional items Assist x 1; Increased time required;Standard toilet   Functional Mobility   Functional Mobility 5  Supervision   Additional Comments S without AD short distance room mobility without LOB or unsteadiness noted   Additional items   (no AD)   Balance   Static Sitting Normal   Dynamic Sitting Good   Static Standing Fair +   Dynamic Standing Fair   Ambulatory Fair   Activity Tolerance   Activity Tolerance Patient tolerated treatment well   Medical Staff Made Aware CM   Nurse Made Aware RN cleared pt for therapy   RUE Assessment   RUE Assessment WFL   LUE Assessment   LUE Assessment WFL   Hand Function   Gross Motor Coordination Functional   Fine Motor Coordination Functional   Vision-Basic Assessment   Current Vision Wears glasses only for reading   Cognition   Overall Cognitive Status Impaired   Arousal/Participation Alert; Cooperative   Attention Attends with cues to redirect   Orientation Level Oriented X4   Memory   (able to recall social history/LTM-highschool/place born)   Following Commands Follows multistep commands with increased time or repetition   Comments Per ACLS 4 8 score indicates pt able to live alone with daily checks to monitor safety and check problem solving effectiveness-see below for full results   Cognition Assessment Tools ACLS   Score 4 8   Assessment   Limitation Decreased ADL status; Decreased endurance;Decreased Safe judgement during ADL;Decreased self-care trans;Decreased high-level ADLs; Decreased cognition   Prognosis Fair   Assessment Pt is a 53 y o  male who was [facility] to [facility] 10/[facility]/2019 with Alcohol dependence with uncomplicated withdrawal (HCC) , HTN, multiple open wounds of RLE, COPD, nicotine dependence, hypernatremia  Pt's problem list also includes PMH of see initial evaluation for full past medical history  At baseline pt was completing ADL's/IADL's with I, no AD with functional ambulation, does not drive/work  Pt lives with roommate  Currently pt requires S/set-up for overall ADLS and S/mod I without AD for functional mobility/transfers  Pt currently presents with impairments in the following categories -steps to enter environment, limited home support, difficulty performing IADLS , compliance, health management  and environment activity tolerance, safety , judgement  and coping skills problem solving  These impairments, as well as pt's fatigue, WBS , impulsivity, decreased caregiver support, risk for falls and home environment  limit pt's ability to safely engage in all baseline areas of occupation, includingcommunity mobility, laundry , house maintenance, medication management, meal prep, cleaning, social participation  and leisure activities  From OT standpoint, recommend home with daily checks upon D/C  OT will continue to follow to address the below stated goals      Goals   Patient Goals to be discharged "now"   STG Time Frame 1-3   Short Term Goal #1 pt will participate in ongoing cogntiive assessment with good particiation to determine safe discharge plans   Plan   Treatment Interventions ADL retraining;Functional transfer training;Patient/family training;Continued evaluation   Goal Expiration Date 11/09/19   OT Treatment Day 1   OT Frequency 3-5x/wk   Additional Treatment Session   Start Time 1350   End Time 1400   Treatment Assessment Pt seen for an additional OT treatment session with focus on ACLS testing  Pt performed ACLS scoring a 4 8 which indicates pt able to live alone with daily checks to monitor safety and check problem solving effectiveness  CM and team notified in rounds this date  Pt continues with 1:1 for risk of elopement (per chart deemed incompetent October 18-sister with guardianship/POA)  Recommend return home with daily checks  Pt is functioning at a S/Mod I level with ADL's, mobility without AD  No further acute OT needs indicated at this time - Recommend continued oob for meals, ambulation to/from BR, setup for self care tasks and mobility in hallway with nursing/restorative - d/c from caseload with above recommendations  Recommendation   OT Discharge Recommendation Home with daily checks   OT - OK to Discharge Yes   Barthel Index   Feeding 10   Bathing 0   Grooming Score 5   Dressing Score 10   Bladder Score 10   Bowels Score 10   Toilet Use Score 10   Transfers (Bed/Chair) Score 10   Mobility (Level Surface) Score 0   Stairs Score 0   Barthel Index Score 65   Modified [place] Scale   Modified Salem Scale 2     4 8    Administered Ezio Cognitive Level Screen (ACLS)  The patient scored 4 8/6 0 indicating that they may live alone with daily assistance to monitor personal safety and check problem solving effectiveness  Behavior:  Asks for verification  Knows how 2 concurrent schedules fit together  Fits daily routine into schedule of others  Can avoid obvious hazards  More flexible and willing to adapt to new ideas  Insight into disability is fair  Processing is delayed  May be able to get to a regularly scheduled appointment without need for assistance  May frequently stop a task to examine objects  Seeks verification from others    Grooming:  Checks results of grooming, hair styling and make up application  Learns new grooming procedures slowly  May rigidly follow prescribed routines  Dressing: Considers all striking features of garments including color, pattern, condition and fit  May not attend to cleanliness or consider social standards  May don clothing slowly and check results in mirror  Bathing:  May coordinate bathing schedule with others  Attends to all features of bathing environment  May check results and vary rate  Reports low supplies to caregivers  Walking/Exercising:  Learns new routes over several days  Scans environment, reads street signs and attempts to use this information but may still get lost   Understands explanations of safety hazards  Learns an exercise program and does it without variation  Eating:  Eats and attends to social conversation though may not be able to talk and eat at the same time  Manages all utensils  Follows a meal time schedule  May be reminded to check food temperatures  Toileting:  Independently performs usual toileting routine  May not anticipate use of bathroom before trips  May learn bathroom etiquette like conserving paper  Medications: Follows new prescriptions slowly  Considers variables like time of day and amount but with difficulty  Follows verbal explanations  May follow a set schedule set up by others for renewing prescriptions  Utilize a daily pill box (set up by someone else)  Use of adaptive equipment:  Recognizes loss of strength, sensation and balance  May learn a series of new actions slowly  Housekeeping:  Initiates and completes a routine of housekeeping activities  Learns new procedures slowly  Notes all visible effects of cleaning and checking results  Corrects visible errors one at a time  May not anticipate supplies but reports low supplies to caregiver  May not identify potential hazards related to electric, gas, toxins, poisons and improper storage/disposal of items    Food preparation: Does not plan for long term food needs  May learn to follow a plan for purchasing food items  May follow a weekly schedule for shopping  Memorizes a new sequence of actions to prepare dishes  Follows a written recipe in a rigid manner  Check stove after use  Spending Money:  Slowly follows budget or learns money management procedures  May need assistance to adjust to change in income or usual banking procedures  Shopping: Learns directions to new shopping areas  May learn to use a shopping list or use money saving procedures like coupons  May not read labels or consider whether it is practical or affordable  Laundry:  Completes laundry routine in a fixed manner  Memorizes procedures like using a new [employer]  May try to read new labels but needs assistance in interpreting them  May overload machine  Traveling:  Learns new routes or travel procedures slowly  May learn to use a wheelchair but has difficulty maneuvering or estimating space requirements  Does not anticipate hazards  May follow safety precautions pointed out by others  Telephone:  Learns to use a new telephone answering machine or pay phone slowly  Does not vary actions  May memorize correct times to call others  Driving:  Should NOT operate a motor vehicle      Swetha MILLER, OTR/L

## 2019-11-06 NOTE — NURSING NOTE
Pt complaining of toothache, states he "has a tooth that needs to be pulled"  Notified SOD  One time dose of toradol ordered

## 2019-11-06 NOTE — PROGRESS NOTES
INTERNAL MEDICINE RESIDENCY PROGRESS NOTE     Name: Kristy Oliva   Age & Sex: 53 y o  male   MRN: [id number]  Unit/Bed#: [facility] 806-01   Encounter: [id number]  Team: SOD Team A    PATIENT INFORMATION     Name: Kristy Oliva   Age & Sex: 53 y o  male   MRN: [id number]  Hospital Stay Days: 4    ASSESSMENT/PLAN     Principal Problem:    Alcohol dependence with uncomplicated withdrawal ([place] [address] )  Active Problems:    Nicotine dependence    COPD (chronic obstructive pulmonary disease) ([place] [address] )    Multiple open wounds of right lower extremity    Hypertension      Hypertension  Assessment & Plan  Likely secondary to withdrawal tachycardia  resolved  - hydralazine 10mg prn  - continue to monitor    Multiple open wounds of right lower extremity  Assessment & Plan  Patient endorsed walking long distances without socks and has multiple blisters  - wound care consulted recommended podiatry consult  - podiatry managing wounds at this time, do not feel they are infected    COPD (chronic obstructive pulmonary disease) ([place] [address] )  Assessment & Plan  Not in exacerbation  States daily albuterol use  Endorses better breathing and no cough at this time  - continue albuterol prn    Nicotine dependence  Assessment & Plan  Tobacco abuse:  Counseled for cessation, Refused nicotine gum  -nicotine patch 21 mcg    * Alcohol dependence with uncomplicated withdrawal (HCC)  Assessment & Plan  Endorses daily EtOH use 3-4 beers and 3-4 shots of hard liquor daily, as well as history of withdrawal   FARRUKH 264 on admission  Patient currently denying withdrawal symptoms, though has continued to score on CIWA  - CIWA protocol  - Will start longer acting benzodiazepine 11/3 : now on diazepam q12hrs   - IV thiamine   - Psychiatry consulted at the request of sister, does not meet inpatient criteria    Hypokalemia-resolved as of 11/2/2019  Assessment & Plan  Likely secondary to poor PO intake    - Kdur 40mg x2 -> K currently 4 1  - continue to monitor Hypernatremia-resolved as of 2019  Assessment & Plan  Likely secondary to volume depletion in setting of EtOH diuresis  Currently 142 following d5 infusion  - continue to monitor BMP    Alcohol withdrawal (HCC)-resolved as of 11/3/2019  Assessment & Plan  Endorses daily EtOH use 3-4 beers and 3-4 shots of hard liquor daily, as well as history of withdrawal   FARRUKH 264 on admission  Patient currently denying withdrawal symptoms  - [facility] protocol  - thiamine   - Psychiatry consulted at the request of sister, does not meet inpatient criteria      Disposition: medically stable for discharge, pending placement    SUBJECTIVE     Patient seen and examined  No acute events overnight  He remains frustrated that he is unable to leave  No headache, chest pain, or sob  OBJECTIVE     Vitals:    19 1851 19 2253 19 0257 19 0643   BP: 122/84 145/88 129/79 127/81   Pulse: 76 61 66 85   Resp: 17 18 17 16   Temp: 97 7 °F (36 5 °C) (!) 97 2 °F (36 2 °C) (!) 97 4 °F (36 3 °C) 98 8 °F (37 1 °C)   TempSrc:       SpO2: 96% 97% 95% 95%   Weight:       Height:          Temperature:   Temp (24hrs), Av 7 °F (36 5 °C), Min:97 2 °F (36 2 °C), Max:98 8 °F (37 1 °C)    Temperature: 98 8 °F (37 1 °C)  Intake & Output:  I/O       10/30 07 - 10/31 0700 10/31 07 -  0700  07 -  0700    IV Piggyback  100     Total Intake  100     Urine  1750     Stool  0     Total Output  1750     Net  -1650            Unmeasured Stool Occurrence  0 x         Weights:   IBW: 79 9 kg    Body mass index is 23 09 kg/m²  Weight (last 2 days)     None        Physical Exam   Constitutional: He is oriented to person, place, and time  He appears well-developed and well-nourished  No distress  HENT:   Head: Normocephalic and atraumatic  Eyes: Pupils are equal, round, and reactive to light  EOM are normal    Cardiovascular: Normal rate, regular rhythm and normal heart sounds  Exam reveals no friction rub     No murmur heard  Pulmonary/Chest: Effort normal and breath sounds normal  No stridor  No respiratory distress  He has no wheezes  He has no rales  Abdominal: Soft  Bowel sounds are normal  He exhibits no distension and no mass  There is no tenderness  There is no rebound and no guarding  Musculoskeletal: Normal range of motion  He exhibits no edema, tenderness or deformity  Neurological: He is alert and oriented to person, place, and time  No cranial nerve deficit  Skin: Skin is warm and dry  He is not diaphoretic  Feet with multiple healing erythematous vesicles with the most prominent on his dorsal side of his R foot   Vitals reviewed  LABORATORY DATA     Labs: I have personally reviewed pertinent reports  Results from last 7 days   Lab Units 10/31/19  1549   WBC Thousand/uL 4 60   HEMOGLOBIN g/dL 13 9   HEMATOCRIT % 41 2   PLATELETS Thousands/uL 241   NEUTROS PCT % 52   MONOS PCT % 7      Results from last 7 days   Lab Units 11/04/19  0454 11/02/19  0456 11/01/19  1454 10/31/19  1549   POTASSIUM mmol/L 4 2 4 1 3 7 3 3*   CHLORIDE mmol/L 108 109* 107 115*   CO2 mmol/L 30 26 27 25   BUN mg/dL 9 7 11 6   CREATININE mg/dL 0 74 0 69 0 70 0 78   CALCIUM mg/dL 8 9 8 6 8 6 8 1*   ALK PHOS U/L 93  --   --  91   ALT U/L 74  --   --  70   AST U/L 69*  --   --  184*                            IMAGING & DIAGNOSTIC TESTING     Radiology Results: I have personally reviewed pertinent reports  No results found  Other Diagnostic Testing: I have personally reviewed pertinent reports      ACTIVE MEDICATIONS     Current Facility-Administered Medications   Medication Dose Route Frequency    acetaminophen (TYLENOL) tablet 975 mg  975 mg Oral Q6H PRN    albuterol (PROVENTIL HFA,VENTOLIN HFA) inhaler 2 puff  2 puff Inhalation Q6H PRN    calcium carbonate (TUMS) chewable tablet 500 mg  500 mg Oral Daily PRN    diazepam (VALIUM) tablet 10 mg  10 mg Oral N44C    folic acid 1 mg, thiamine (VITAMIN B1) 100 mg in sodium chloride 0 9 % 100 mL IV piggyback   Intravenous Daily    hydrALAZINE (APRESOLINE) injection 10 mg  10 mg Intravenous Q6H PRN    nicotine (NICODERM CQ) 21 mg/24 hr TD 24 hr patch 1 patch  1 patch Transdermal Daily       VTE Pharmacologic Prophylaxis: Reason for no pharmacologic prophylaxis low risk  VTE Mechanical Prophylaxis: sequential compression device    Portions of the record may have been created with voice recognition software  Occasional wrong word or "sound a like" substitutions may have occurred due to the inherent limitations of voice recognition software    Read the chart carefully and recognize, using context, where substitutions have occurred   ==  Anayeli Day MD  [facility]  Internal Medicine Residency PGY-1

## 2019-11-07 VITALS
HEIGHT: 73 IN | WEIGHT: 175 LBS | OXYGEN SATURATION: 97 % | BODY MASS INDEX: 23.19 KG/M2 | TEMPERATURE: 97.2 F | DIASTOLIC BLOOD PRESSURE: 83 MMHG | HEART RATE: 79 BPM | SYSTOLIC BLOOD PRESSURE: 117 MMHG | RESPIRATION RATE: 18 BRPM

## 2019-11-07 PROCEDURE — NC001 PR NO CHARGE: Performed by: HOSPITALIST

## 2019-11-07 PROCEDURE — 99238 HOSP IP/OBS DSCHRG MGMT 30/<: CPT | Performed by: HOSPITALIST

## 2019-11-07 RX ORDER — NICOTINE 21 MG/24HR
1 PATCH, TRANSDERMAL 24 HOURS TRANSDERMAL DAILY
Qty: 28 PATCH | Refills: 0 | Status: SHIPPED | OUTPATIENT
Start: 2019-11-07 | End: 2022-05-18 | Stop reason: ALTCHOICE

## 2019-11-07 RX ADMIN — ACETAMINOPHEN 975 MG: 325 TABLET ORAL at 14:40

## 2019-11-07 RX ADMIN — ACETAMINOPHEN 975 MG: 325 TABLET ORAL at 06:29

## 2019-11-07 RX ADMIN — DIAZEPAM 10 MG: 5 TABLET ORAL at 06:28

## 2019-11-07 RX ADMIN — NICOTINE 1 PATCH: 21 PATCH, EXTENDED RELEASE TRANSDERMAL at 10:39

## 2019-11-07 RX ADMIN — THIAMINE HYDROCHLORIDE: 100 INJECTION, SOLUTION INTRAMUSCULAR; INTRAVENOUS at 10:39

## 2019-11-07 NOTE — PLAN OF CARE
Problem: Prexisting or High Potential for Compromised Skin Integrity  Goal: Skin integrity is maintained or improved  Description  INTERVENTIONS:  - Identify patients at risk for skin breakdown  - Assess and monitor skin integrity  - Assess and monitor nutrition and hydration status  - Monitor labs   - Assess for incontinence   - Turn and reposition patient  - Assist with mobility/ambulation  - Relieve pressure over bony prominences  - Avoid friction and shearing  - Provide appropriate hygiene as needed including keeping skin clean and dry  - Evaluate need for skin moisturizer/barrier cream  - Collaborate with interdisciplinary team   - Patient/family teaching  - Consider wound care consult   Outcome: Progressing     Problem: PAIN - ADULT  Goal: Verbalizes/displays adequate comfort level or baseline comfort level  Description  Interventions:  - Encourage patient to monitor pain and request assistance  - Assess pain using appropriate pain scale  - Administer analgesics based on type and severity of pain and evaluate response  - Implement non-pharmacological measures as appropriate and evaluate response  - Consider cultural and social influences on pain and pain management  - Notify physician/advanced practitioner if interventions unsuccessful or patient reports new pain  Outcome: Progressing     Problem: SAFETY ADULT  Goal: Patient will remain free of falls  Description  INTERVENTIONS:  - Assess patient frequently for physical needs  -  Identify cognitive and physical deficits and behaviors that affect risk of falls    -  [place] fall precautions as indicated by assessment   - Educate patient/family on patient safety including physical limitations  - Instruct patient to call for assistance with activity based on assessment  - Modify environment to reduce risk of injury  - Consider OT/PT consult to assist with strengthening/mobility  Outcome: Progressing  Goal: Maintain or return to baseline ADL function  Description  INTERVENTIONS:  -  Assess patient's ability to carry out ADLs; assess patient's baseline for ADL function and identify physical deficits which impact ability to perform ADLs (bathing, care of mouth/teeth, toileting, grooming, dressing, etc )  - Assess/evaluate cause of self-care deficits   - Assess range of motion  - Assess patient's mobility; develop plan if impaired  - Assess patient's need for assistive devices and provide as appropriate  - Encourage maximum independence but intervene and supervise when necessary  - Involve family in performance of ADLs  - Assess for home care needs following discharge   - Consider OT consult to assist with ADL evaluation and planning for discharge  - Provide patient education as appropriate  Outcome: Progressing  Goal: Maintain or return mobility status to optimal level  Description  INTERVENTIONS:  - Assess patient's baseline mobility status (ambulation, transfers, stairs, etc )    - Identify cognitive and physical deficits and behaviors that affect mobility  - Identify mobility aids required to assist with transfers and/or ambulation (gait belt, sit-to-stand, lift, walker, cane, etc )  - [place] fall precautions as indicated by assessment  - Record patient progress and toleration of activity level on Mobility SBAR; progress patient to next Phase/Stage  - Instruct patient to call for assistance with activity based on assessment  - Consider rehabilitation consult to assist with strengthening/weightbearing, etc   Outcome: Progressing     Problem: DISCHARGE PLANNING  Goal: Discharge to home or other facility with appropriate resources  Description  INTERVENTIONS:  - Identify barriers to discharge w/patient and caregiver  - Arrange for needed discharge resources and transportation as appropriate  - Identify discharge learning needs (meds, wound care, etc )  - Arrange for interpretive services to assist at discharge as needed  - Refer to Case Management Department for coordinating discharge planning if the patient needs post-hospital services based on physician/advanced practitioner order or complex needs related to functional status, cognitive ability, or social support system  Outcome: Progressing     Problem: Knowledge Deficit  Goal: Patient/family/caregiver demonstrates understanding of disease process, treatment plan, medications, and discharge instructions  Description  Complete learning assessment and assess knowledge base    Interventions:  - Provide teaching at level of understanding  - Provide teaching via preferred learning methods  Outcome: Progressing

## 2019-11-07 NOTE — NURSING NOTE
Pt  States  "I will be leaving today one way or another  I don't need a  " Pt  Currently cooperative but concerned with leaving later today

## 2019-11-07 NOTE — DISCHARGE INSTRUCTIONS
Abuse of Alcohol   WHAT YOU NEED TO KNOW:   · Alcohol abuse is unhealthy drinking behavior  You may drink too much at one time once a week, or continue to drink too much daily  You continue to drink even though it causes problems  The problems can be alcohol related legal problems, or problems with work or relationships with family  · If you drink too much at one time, you are binge drinking  Binge drinking is when you have a large amount of alcohol in a short time  Your blood alcohol concentrations (FARRUKH) goes above 0 08 g/dLlevel during binge drinking  For men, this usually happens with more than 4 drinks in 2 hours  For women, it is more than 3 drinks in 2 hours  A drink is 12 ounces of beer, 4 ounces of wine, or 1½ ounces of liquor  DISCHARGE INSTRUCTIONS:   Call 911 for the following:   · You have sudden chest pain or trouble breathing  · You have a seizure or have shaking or trembling  · You were in an accident because of alcohol  Seek care immediately if:   · You want to harm yourself or others  · You have hallucinations (you see or hear things that are not real)  · You cannot stop vomiting or you vomit blood  Contact your healthcare provider if:   · You need help to stop drinking alcohol  · You have questions or concerns about your condition or care  Medicines:   · Vitamin supplements  may be given to treat low vitamin levels  Alcohol can make it hard for your body to absorb enough vitamins such as B1  Vitamin supplements may also be given to prevent alcohol related brain damage  · Take your medicine as directed  Contact your healthcare provider if you think your medicine is not helping or if you have side effects  Tell him or her if you are allergic to any medicine  Keep a list of the medicines, vitamins, and herbs you take  Include the amounts, and when and why you take them  Bring the list or the pill bottles to follow-up visits   Carry your medicine list with you in case of an emergency  Treatments or therapies you may need:   · Detoxification (detox) and withdrawal  is a program that helps you to safely get alcohol out of your body  Detox can also help get rid of the physical need to drink  Healthcare providers monitor the physical symptoms of withdrawal  They may give you medicines to help decrease nausea, dehydration, and seizures  Healthcare providers will also monitor your blood pressure, heart and breathing rates, and your temperature  Symptoms of anxiety, depression, and suicidal thoughts are also monitored and managed during detox  Healthcare providers may give you medicines for these symptoms and therapy sessions will be available to you  Detox is usually done at a detox center or in a hospital  Healthcare providers do not recommend that you try to detox at home or by yourself  Withdrawal symptoms may become life-threatening  The center can help you find 12 step programs or an individual therapist to help with emotional support after detox  · Inpatient and outpatient treatment  focus on your personal needs to help you stop drinking  Treatment helps you understand the reasons you abuse alcohol  Counselors and therapists provide you with support and help you find ways to cope instead of drinking  You may need inpatient treatment to provide a controlled environment  You may need outpatient treatment after your inpatient treatment is complete  · Alcohol aversion therapy  takes away the desire to drink by causing a negative reaction when you drink  Healthcare providers may give you medicines that cause nausea and vomiting when you drink alcohol  They may instead give you a medicine that decreases your urge to drink alcohol  These medicines are used to help you stop drinking or reduce the amount you drink  They can also help you avoid relapse  Follow up with your healthcare provider as directed:  Write down your questions so you remember to ask them during your visits    Avoid alcohol:  You should stop drinking entirely  Alcohol can damage your brain, heart, and liver  It also increases your risk for injury, high blood pressure, and certain types of cancer  Alcohol is dangerous when you combine it with certain medicines  Do not drive if you drink alcohol:  Make sure someone who has not been drinking can help you get home  Get support:  Most people need support to stop drinking alcohol  Mental health providers, support groups, rehabilitation centers, and your healthcare provider can provide support  For more information:   · Alcoholics Anonymous  Web Address: http://[employer]/  · Substance Abuse and [address] [place]  Web Address: https://[employer]/  © 2017 [address] Information is for End User's use only and may not be sold, redistributed or otherwise used for commercial purposes  All illustrations and images included in CareNotes® are the copyrighted property of A D A M , Inc  or Ernst Hui  The above information is an  only  It is not intended as medical advice for individual conditions or treatments  Talk to your doctor, nurse or pharmacist before following any medical regimen to see if it is safe and effective for you
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
## 2019-11-07 NOTE — DISCHARGE SUMMARY
[facility] CENTRAL Discharge Summary - Medical Khaicristina Jacques 53 y o  male MRN: [id number]    [address] [facility] [address] [facility] 8 Room / Bed: [facility] 806/[facility] 806-01 Encounter: [id number]    BRIEF OVERVIEW    Admitting Provider: Rita Cisneros MD  Discharge Provider: Fiona Andrade MD  Primary Care Physician at Discharge: To be established at discharge    [address] [facility]  Admission Date: 10/31/2019     Discharge Date: No discharge date for patient encounter  Hospital Course  Mr Chitra Jacques is a 48yo with a past medical history significant for EtOH abuse and COPD who presented via police after being found stumbling around drunk  Additional information regarding his initial presentation can be found in his H&P  On admission his ethanol level was 264, UDS was negative  His CMP was significant for hypernatremia, hypokalemia, hyperchloremia and an elevated AST  During his admission he was put on IV normal saline and PO potassium and his electrolytes normalized  He was also supplemented with thiamine and folic acid  He was treated with both short and long acting benzodiazepines to prevent EtOH withdrawal   He was also evaluated by podiatry for blisters on his feet secondary to walking without wearing socks  They were not infected and treated with local wound care  His hospital course was complicated by his prior  legal documents stating that he lacked competency and his sister was deemed guardian of his person and estate  She requested that he be evaluated for a psychiatric admission; however, our psychiatrist did not feel that he warranted inpatient psychiatric treatment  The sister Jeremy Marc became extremely difficult to get in touch with and the hospital triggered an investigation with the [facility] agency for the [employer]  He was ultimately discharged in stable condition to his guardian to be transported to [facility]       Presenting Problem/History of Present Illness  Principal Problem: Alcohol dependence with uncomplicated withdrawal ([place] [address] )  Active Problems:    Nicotine dependence    COPD (chronic obstructive pulmonary disease) ([place] [address] )    Multiple open wounds of right lower extremity    Hypertension  Resolved Problems:    Alcohol withdrawal ([place] [address] )    Alcohol intoxication ([place] [address] )    Hypernatremia    Hypokalemia    Hypertension  Assessment & Plan  Likely secondary to withdrawal tachycardia  resolved  - hydralazine 10mg prn  - continue to monitor    Multiple open wounds of right lower extremity  Assessment & Plan  Patient endorsed walking long distances without socks and has multiple blisters  - wound care consulted recommended podiatry consult  - podiatry managing wounds at this time, do not feel they are infected    COPD (chronic obstructive pulmonary disease) ([place] [address] )  Assessment & Plan  Not in exacerbation  States daily albuterol use  Endorses better breathing and no cough at this time  - continue albuterol prn    Nicotine dependence  Assessment & Plan  Tobacco abuse:  Counseled for cessation, Refused nicotine gum  -nicotine patch 21 mcg    * Alcohol dependence with uncomplicated withdrawal (HCC)  Assessment & Plan  Endorses daily EtOH use 3-4 beers and 3-4 shots of hard liquor daily, as well as history of withdrawal   FARRUKH 264 on admission  Patient currently denying withdrawal symptoms  - [facility] protocol  - discontinue diazepam  - IV thiamine   - Psychiatry consulted at the request of sister, does not meet inpatient criteria    Hypokalemia-resolved as of 11/2/2019  Assessment & Plan  Likely secondary to poor PO intake  - Kdur 40mg x2 -> K currently 4 1  - continue to monitor     Hypernatremia-resolved as of 11/2/2019  Assessment & Plan  Likely secondary to volume depletion in setting of EtOH diuresis   Currently 142 following d5 infusion  - continue to monitor BMP    Alcohol withdrawal (HCC)-resolved as of 11/3/2019  Assessment & Plan  Endorses daily EtOH use 3-4 beers and 3-4 shots of hard liquor daily, as well as history of withdrawal    on admission  Patient currently denying withdrawal symptoms  - [facility] protocol  - thiamine   - Psychiatry consulted at the request of sister, does not meet inpatient criteria          Diagnostic Procedures Performed  Imaging Studies:  No results found  Pertinent Labs:    Results from last 7 days   Lab Units 11/04/19  0454 11/02/19  0456 11/01/19  1454 10/31/19  1549   POTASSIUM mmol/L 4 2 4 1 3 7 3 3*   CHLORIDE mmol/L 108 109* 107 115*   CO2 mmol/L 30 26 27 25   BUN mg/dL 9 7 11 6   CREATININE mg/dL 0 74 0 69 0 70 0 78   CALCIUM mg/dL 8 9 8 6 8 6 8 1*   ALK PHOS U/L 93  --   --  91   ALT U/L 74  --   --  70   AST U/L 69*  --   --  184*       Therapeutic Procedures Performed  n/a    Test Results Pending at Discharge:   n/a    Medications     Medication List to be Continued at Discharge  Current Discharge Medication List      CONTINUE these medications which have NOT CHANGED    Details   acetaminophen (TYLENOL) 325 mg tablet Take 650 mg by mouth every 6 (six) hours as needed for mild pain or headaches      albuterol (VENTOLIN HFA) 90 mcg/act inhaler Inhale 2 puffs every 6 (six) hours as needed for wheezing  Qty: 18 g, Refills: 0    Associated Diagnoses: COPD with acute exacerbation ([facility] [address] )           Current Discharge Medication List        Current Discharge Medication List          Allergies  No Known Allergies  Discharge Diet: regular diet  Activity restrictions: none  Discharge Condition: good  Discharged With Lines: no    Discharge Disposition: [facility] [address] / Family Member Name: Fernando Calero  Phone Number: [phone]    Outpatient Follow-Up  none required      Code Status: Level 1 - Full Code  Advance Directive and Living Will: <no information>  Power of : Yes  POLST:      Discharge  Statement   I spent 30 minutes minutes discharging the patient  This time was spent on the day of discharge   I had direct contact with the patient on the day of discharge       Jenny Dillard MD  Internal Medicine  PGY1  11/7/2019 9:53 AM

## 2019-11-14 NOTE — UTILIZATION REVIEW
Notification of Discharge  This is a Notification of Discharge from our facility [address]  Please be advised that this patient has been discharge from our facility  Below you will find the admission and discharge date and time including the patients disposition  PRESENTATION DATE: 10/31/2019 11:04 AM  OBS ADMISSION DATE:   IP ADMISSION DATE: 11/2/19 1707   DISCHARGE DATE: 11/7/2019  3:24 PM  DISPOSITION: Home/Self Care Home/Self Care   Admission Orders listed below:  Admission Orders (From admission, onward)     Ordered        11/02/19 1707  Inpatient Admission  Once         10/31/19 1420  Place in Observation  Once                   Please contact the UR Department if additional information is required to close this patient's authorization/case  [address] Utilization Review Department  Main: [phone] x carefully listen to the prompts  All voicemails are confidential   [email or web address]  Send all requests for admission clinical reviews, approved or denied determinations and any other requests to dedicated fax number below belonging to the campus where the patient is receiving treatment    List of dedicated fax numbers:  [address] DENIALS (Administrative/Medical Necessity) [phone]   1000  [facility] (Maternity/NICU/Pediatrics) [phone]   Susie Field [phone]   Shahriar Correa [phone]   Malcom Dominguez [phone]   [address] [facility] [address] [place] [address] [phone]   Laurence Angeles [address] [place] [address] Deejay Padilla [phone]

## 2020-01-31 ENCOUNTER — APPOINTMENT (EMERGENCY)
Dept: RADIOLOGY | Facility: HOSPITAL | Age: 55
End: 2020-01-31
Payer: COMMERCIAL

## 2020-01-31 ENCOUNTER — HOSPITAL ENCOUNTER (EMERGENCY)
Facility: HOSPITAL | Age: 55
Discharge: HOME | End: 2020-01-31
Attending: EMERGENCY MEDICINE | Admitting: EMERGENCY MEDICINE
Payer: COMMERCIAL

## 2020-01-31 VITALS
BODY MASS INDEX: 23.21 KG/M2 | TEMPERATURE: 97 F | SYSTOLIC BLOOD PRESSURE: 133 MMHG | HEART RATE: 96 BPM | WEIGHT: 175.93 LBS | DIASTOLIC BLOOD PRESSURE: 82 MMHG | RESPIRATION RATE: 20 BRPM | OXYGEN SATURATION: 93 %

## 2020-01-31 DIAGNOSIS — J44.1 COPD EXACERBATION (HCC): Primary | ICD-10-CM

## 2020-01-31 PROCEDURE — 94640 AIRWAY INHALATION TREATMENT: CPT

## 2020-01-31 PROCEDURE — 71046 X-RAY EXAM CHEST 2 VIEWS: CPT

## 2020-01-31 PROCEDURE — 99285 EMERGENCY DEPT VISIT HI MDM: CPT | Performed by: PHYSICIAN ASSISTANT

## 2020-01-31 PROCEDURE — 99285 EMERGENCY DEPT VISIT HI MDM: CPT

## 2020-01-31 RX ORDER — SODIUM CHLORIDE FOR INHALATION 0.9 %
12 VIAL, NEBULIZER (ML) INHALATION ONCE
Status: COMPLETED | OUTPATIENT
Start: 2020-01-31 | End: 2020-01-31

## 2020-01-31 RX ORDER — BENZONATATE 100 MG/1
100 CAPSULE ORAL EVERY 8 HOURS
Qty: 21 CAPSULE | Refills: 0 | Status: SHIPPED | OUTPATIENT
Start: 2020-01-31 | End: 2022-05-18 | Stop reason: ALTCHOICE

## 2020-01-31 RX ORDER — PREDNISONE 20 MG/1
20 TABLET ORAL 2 TIMES DAILY WITH MEALS
Qty: 10 TABLET | Refills: 0 | Status: SHIPPED | OUTPATIENT
Start: 2020-01-31 | End: 2020-02-05

## 2020-01-31 RX ORDER — ALBUTEROL SULFATE 90 UG/1
2 AEROSOL, METERED RESPIRATORY (INHALATION) EVERY 4 HOURS PRN
Qty: 1 INHALER | Refills: 0 | Status: SHIPPED | OUTPATIENT
Start: 2020-01-31 | End: 2022-05-20

## 2020-01-31 RX ADMIN — PREDNISONE 50 MG: 20 TABLET ORAL at 09:36

## 2020-01-31 RX ADMIN — ISODIUM CHLORIDE 12 ML: 0.03 SOLUTION RESPIRATORY (INHALATION) at 09:37

## 2020-01-31 RX ADMIN — ALBUTEROL SULFATE 10 MG: 2.5 SOLUTION RESPIRATORY (INHALATION) at 09:38

## 2020-01-31 RX ADMIN — IPRATROPIUM BROMIDE 1 MG: 0.5 SOLUTION RESPIRATORY (INHALATION) at 09:37

## 2020-01-31 NOTE — ED PROVIDER NOTES
History  Chief Complaint   Patient presents with    Shortness of Breath     x 3 days  Patient has a HX of COPD and currently smokes  Patient ran out of his rescue inhaler  Patient reports a productive cough with clear sputum  Patient is a 62-year-old male with past medical history of COPD and current tobacco abuse who presents today for evaluation of increased wheezing and difficulty taking large breaths in and out  Patient reports he has been sick for about 3-4 days  He reports subjective fevers  He denies any nausea or vomiting  He reports a fairly productive cough with clear sputum  He denies any hemoptysis  No chest pain  No nausea, vomiting, diarrhea  Patient reports he is currently in rehab and has not had any alcohol for the past 3 months he denies any abdominal pain  No diarrhea  He reports overall myalgias  He states he ran out of his rescue inhaler last night  He states this was helping  Prior to Admission Medications   Prescriptions Last Dose Informant Patient Reported? Taking?   acetaminophen (TYLENOL) 325 mg tablet   Yes No   Sig: Take 650 mg by mouth every 6 (six) hours as needed for mild pain or headaches   nicotine (NICODERM CQ) 21 mg/24 hr TD 24 hr patch   No No   Sig: Place 1 patch on the skin daily      Facility-Administered Medications: None       Past Medical History:   Diagnosis Date    COPD (chronic obstructive pulmonary disease) ([facility] [address] )        History reviewed  No pertinent surgical history  History reviewed  No pertinent family history  I have reviewed and agree with the history as documented      Social History     Tobacco Use    Smoking status: Current Every Day Smoker     Packs/day: 1 00     Years: 40 00     Pack years: 40 00     Types: Cigarettes    Smokeless tobacco: Never Used   Substance Use Topics    Alcohol use: Yes     Comment: every day    Drug use: No        Review of Systems   Constitutional: Positive for activity change, appetite change and fever    HENT: Positive for congestion  Eyes: Negative  Respiratory: Positive for cough, shortness of breath and wheezing  Cardiovascular: Negative  Gastrointestinal: Negative  Endocrine: Negative  Genitourinary: Negative  Musculoskeletal: Positive for myalgias  Allergic/Immunologic: Negative  Neurological: Negative  Hematological: Negative  Psychiatric/Behavioral: Negative  Physical Exam  Physical Exam   Constitutional: He appears well-developed and well-nourished  No distress  HENT:   Head: Normocephalic  Mouth/Throat: Oropharynx is clear and moist  No oropharyngeal exudate or posterior oropharyngeal edema  Eyes: Pupils are equal, round, and reactive to light  Cardiovascular: Normal rate and normal heart sounds  No murmur heard  Pulmonary/Chest: Effort normal  No accessory muscle usage  No respiratory distress  He has wheezes in the right middle field, the right lower field, the left middle field and the left lower field  Abdominal: Soft  He exhibits no distension  There is no tenderness  Musculoskeletal:        Right lower leg: Normal  He exhibits no edema  Left lower leg: Normal  He exhibits no edema  Lymphadenopathy:     He has no cervical adenopathy  Neurological: He is alert  Skin: Skin is warm  No rash noted  Psychiatric: He has a normal mood and affect   His behavior is normal        Vital Signs  ED Triage Vitals [01/31/20 0914]   Temperature Pulse Respirations Blood Pressure SpO2   (!) 97 °F (36 1 °C) 85 16 136/86 94 %      Temp Source Heart Rate Source Patient Position - Orthostatic VS BP Location FiO2 (%)   Tympanic Monitor Sitting Left arm --      Pain Score       --           Vitals:    01/31/20 0914 01/31/20 1052   BP: 136/86 133/82   Pulse: 85 96   Patient Position - Orthostatic VS: Sitting Lying         Visual Acuity      ED Medications  Medications   albuterol inhalation solution 10 mg (10 mg Nebulization Given 1/31/20 0938) ipratropium (ATROVENT) 0 02 % inhalation solution 1 mg (1 mg Nebulization Given 1/31/20 0937)   sodium chloride 0 9 % inhalation solution 12 mL (12 mL Nebulization Given 1/31/20 0937)   predniSONE tablet 50 mg (50 mg Oral Given 1/31/20 0936)       Diagnostic Studies  Results Reviewed     None                 X-ray chest 2 views   ED Interpretation by Seth Xavier PA-C (01/31 1028)   No acute pneumonia or infiltrate      Final Result by [facility] (01/31 1044)      No acute cardiopulmonary disease  Workstation performed: [id number]                    Procedures  Procedures         ED Course                               MDM  Number of Diagnoses or Management Options  COPD exacerbation [facility]):   Diagnosis management comments: Patient is a 41-year-old male with past medical history of COPD unknown tobacco abuse  He reports today for increased shortness of breath and wheezing  On exam he is wheezing  He underwent an hour long heart neb treatment and reports symptom resolution  He was discharged home with recommendations to continue with prednisone and albuterol as needed  Chest x-ray negative for any acute abnormality  Recommend patient return to the emergency department if symptoms worsen or persist or do not improve after 2 days  Patient was discharged home stable  Disposition  Final diagnoses:   COPD exacerbation ([address] )     Time reflects when diagnosis was documented in both MDM as applicable and the Disposition within this note     Time User Action Codes Description Comment    1/31/2020 11:20 AM Vivienne Meckel R Add [J44 1] COPD exacerbation [facility])       ED Disposition     ED Disposition Condition Date/Time Comment    Discharge Stable Fri Jan 31, 2020 11:20 AM Dre Galarza discharge to home/self care              Follow-up Information    None         Discharge Medication List as of 1/31/2020 11:21 AM      START taking these medications    Details   albuterol (PROVENTIL HFA,VENTOLIN HFA) 90 mcg/act inhaler Inhale 2 puffs every 4 (four) hours as needed for wheezing, Starting Fri 1/31/2020, Print      benzonatate (TESSALON PERLES) 100 mg capsule Take 1 capsule (100 mg total) by mouth every 8 (eight) hours, Starting Fri 1/31/2020, Print      predniSONE 20 mg tablet Take 1 tablet (20 mg total) by mouth 2 (two) times a day with meals for 5 days, Starting Fri 1/31/2020, Until Wed 2/5/2020, Print         CONTINUE these medications which have NOT CHANGED    Details   acetaminophen (TYLENOL) 325 mg tablet Take 650 mg by mouth every 6 (six) hours as needed for mild pain or headaches, Historical Med      nicotine (NICODERM CQ) 21 mg/24 hr TD 24 hr patch Place 1 patch on the skin daily, Starting Thu 11/7/2019, Normal           No discharge procedures on file      ED Provider  Electronically Signed by           Pia Dela Cruz PA-C  01/31/20 9322

## 2020-10-26 NOTE — PROGRESS NOTES
INTERNAL MEDICINE RESIDENCY PROGRESS NOTE     Name: Beverly Link   Age & Sex: 53 y o  male   MRN: [id number]  Unit/Bed#: [facility] 806-01   Encounter: [id number]  Team: SOD Team A    PATIENT INFORMATION     Name: Beverly Link   Age & Sex: 53 y o  male   MRN: [id number]  Hospital Stay Days: 5    ASSESSMENT/PLAN     Principal Problem:    Alcohol dependence with uncomplicated withdrawal ([place] [address] )  Active Problems:    Nicotine dependence    COPD (chronic obstructive pulmonary disease) ([place] [address] )    Multiple open wounds of right lower extremity    Hypertension      Hypertension  Assessment & Plan  Likely secondary to withdrawal tachycardia  resolved  - hydralazine 10mg prn  - continue to monitor    Multiple open wounds of right lower extremity  Assessment & Plan  Patient endorsed walking long distances without socks and has multiple blisters  - wound care consulted recommended podiatry consult  - podiatry managing wounds at this time, do not feel they are infected    COPD (chronic obstructive pulmonary disease) ([place] [address] )  Assessment & Plan  Not in exacerbation  States daily albuterol use  Endorses better breathing and no cough at this time  - continue albuterol prn    Nicotine dependence  Assessment & Plan  Tobacco abuse:  Counseled for cessation, Refused nicotine gum  -nicotine patch 21 mcg    * Alcohol dependence with uncomplicated withdrawal (HCC)  Assessment & Plan  Endorses daily EtOH use 3-4 beers and 3-4 shots of hard liquor daily, as well as history of withdrawal   FARRUHK 264 on admission  Patient currently denying withdrawal symptoms  - [facility] protocol  - discontinue diazepam  - IV thiamine   - Psychiatry consulted at the request of sister, does not meet inpatient criteria    Hypokalemia-resolved as of 11/2/2019  Assessment & Plan  Likely secondary to poor PO intake    - Kdur 40mg x2 -> K currently 4 1  - continue to monitor     Hypernatremia-resolved as of 11/2/2019  Assessment & Plan  Likely secondary to volume depletion in setting of EtOH diuresis  Currently 142 following d5 infusion  - continue to monitor BMP    Alcohol withdrawal (HCC)-resolved as of 11/3/2019  Assessment & Plan  Endorses daily EtOH use 3-4 beers and 3-4 shots of hard liquor daily, as well as history of withdrawal   FARRUKH 264 on admission  Patient currently denying withdrawal symptoms  - [facility] protocol  - thiamine   - Psychiatry consulted at the request of sister, does not meet inpatient criteria    Patient was evaluated at bedside for capacity to make his own medical decisions  He demonstrated clear understanding of his medical conditions and the consequences of each of these  He was able to reason through his situation and was able to clearly communicate his choices  From the perspective of our medical team we feel he has the capacity to make his own medical decisions  Disposition: medically stable for discharge, pending placement    SUBJECTIVE     Patient seen and examined  No acute events overnight  He was ambulating around his room this am  He remains frustrated that he is unable to leave  No headache, chest pain, or sob  OBJECTIVE     Vitals:    19 1908 19 2303 19 0307 19 0720   BP: 118/82 110/66 111/68 117/83   Pulse: 82   79   Resp: 16 16 17 18   Temp: 97 9 °F (36 6 °C) (!) 97 2 °F (36 2 °C) (!) 97 2 °F (36 2 °C)    TempSrc:       SpO2: 97%   97%   Weight:       Height:          Temperature:   Temp (24hrs), Av 7 °F (36 5 °C), Min:97 2 °F (36 2 °C), Max:98 2 °F (36 8 °C)    Temperature: (!) 97 2 °F (36 2 °C)  Intake & Output:  I/O       10/30 0701 - 10/31 0700 10/31 0701 -  0700  07 -  0700    IV Piggyback  100     Total Intake  100     Urine  1750     Stool  0     Total Output  1750     Net  -1650            Unmeasured Stool Occurrence  0 x         Weights:   IBW: 79 9 kg    Body mass index is 23 09 kg/m²    Weight (last 2 days)     None        Physical Exam   Constitutional: He is oriented to person, place, and time  He appears well-developed and well-nourished  No distress  HENT:   Head: Normocephalic and atraumatic  Eyes: Pupils are equal, round, and reactive to light  EOM are normal    Cardiovascular: Normal rate, regular rhythm and normal heart sounds  Exam reveals no friction rub  No murmur heard  Pulmonary/Chest: Effort normal and breath sounds normal  No stridor  No respiratory distress  He has no wheezes  He has no rales  Abdominal: Soft  Bowel sounds are normal  He exhibits no distension and no mass  There is no tenderness  There is no rebound and no guarding  Musculoskeletal: Normal range of motion  He exhibits no edema, tenderness or deformity  Neurological: He is alert and oriented to person, place, and time  No cranial nerve deficit  Skin: Skin is warm and dry  He is not diaphoretic  Vitals reviewed  LABORATORY DATA     Labs: I have personally reviewed pertinent reports  Results from last 7 days   Lab Units 10/31/19  1549   WBC Thousand/uL 4 60   HEMOGLOBIN g/dL 13 9   HEMATOCRIT % 41 2   PLATELETS Thousands/uL 241   NEUTROS PCT % 52   MONOS PCT % 7      Results from last 7 days   Lab Units 11/04/19  0454 11/02/19  0456 11/01/19  1454 10/31/19  1549   POTASSIUM mmol/L 4 2 4 1 3 7 3 3*   CHLORIDE mmol/L 108 109* 107 115*   CO2 mmol/L 30 26 27 25   BUN mg/dL 9 7 11 6   CREATININE mg/dL 0 74 0 69 0 70 0 78   CALCIUM mg/dL 8 9 8 6 8 6 8 1*   ALK PHOS U/L 93  --   --  91   ALT U/L 74  --   --  70   AST U/L 69*  --   --  184*                            IMAGING & DIAGNOSTIC TESTING     Radiology Results: I have personally reviewed pertinent reports  No results found  Other Diagnostic Testing: I have personally reviewed pertinent reports      ACTIVE MEDICATIONS     Current Facility-Administered Medications   Medication Dose Route Frequency    acetaminophen (TYLENOL) tablet 975 mg  975 mg Oral Q6H PRN    albuterol (PROVENTIL HFA,VENTOLIN HFA) inhaler 2 puff  2 puff Inhalation Q6H PRN    calcium carbonate (TUMS) chewable tablet 500 mg  500 mg Oral Daily PRN    folic acid 1 mg, thiamine (VITAMIN B1) 100 mg in sodium chloride 0 9 % 100 mL IV piggyback   Intravenous Daily    hydrALAZINE (APRESOLINE) injection 10 mg  10 mg Intravenous Q6H PRN    nicotine (NICODERM CQ) 21 mg/24 hr TD 24 hr patch 1 patch  1 patch Transdermal Daily       VTE Pharmacologic Prophylaxis: Reason for no pharmacologic prophylaxis low risk  VTE Mechanical Prophylaxis: sequential compression device    Portions of the record may have been created with voice recognition software  Occasional wrong word or "sound a like" substitutions may have occurred due to the inherent limitations of voice recognition software    Read the chart carefully and recognize, using context, where substitutions have occurred   ==  Poonam Nowak MD  [address]  Internal Medicine Residency PGY-1 Bilobed Transposition Flap Text: The defect edges were debeveled with a #15 scalpel blade.  Given the location of the defect and the proximity to free margins a bilobed transposition flap was deemed most appropriate.  Using a sterile surgical marker, an appropriate bilobe flap drawn around the defect.    The area thus outlined was incised deep to adipose tissue with a #15 scalpel blade.  The skin margins were undermined to an appropriate distance in all directions utilizing iris scissors.

## 2021-02-10 ENCOUNTER — OFFICE VISIT (OUTPATIENT)
Dept: URGENT CARE | Facility: CLINIC | Age: 56
End: 2021-02-10
Payer: COMMERCIAL

## 2021-02-10 ENCOUNTER — APPOINTMENT (OUTPATIENT)
Dept: RADIOLOGY | Facility: CLINIC | Age: 56
End: 2021-02-10
Payer: COMMERCIAL

## 2021-02-10 VITALS
TEMPERATURE: 98.4 F | BODY MASS INDEX: 23.03 KG/M2 | SYSTOLIC BLOOD PRESSURE: 128 MMHG | WEIGHT: 170 LBS | HEIGHT: 72 IN | HEART RATE: 89 BPM | DIASTOLIC BLOOD PRESSURE: 74 MMHG | RESPIRATION RATE: 18 BRPM | OXYGEN SATURATION: 97 %

## 2021-02-10 DIAGNOSIS — S99.921A INJURY OF RIGHT FOOT, INITIAL ENCOUNTER: ICD-10-CM

## 2021-02-10 DIAGNOSIS — S99.921A INJURY OF RIGHT FOOT, INITIAL ENCOUNTER: Primary | ICD-10-CM

## 2021-02-10 PROCEDURE — G0382 LEV 3 HOSP TYPE B ED VISIT: HCPCS | Performed by: PHYSICIAN ASSISTANT

## 2021-02-10 PROCEDURE — 99283 EMERGENCY DEPT VISIT LOW MDM: CPT | Performed by: PHYSICIAN ASSISTANT

## 2021-02-10 PROCEDURE — 73630 X-RAY EXAM OF FOOT: CPT

## 2021-02-10 PROCEDURE — 99203 OFFICE O/P NEW LOW 30 MIN: CPT | Performed by: PHYSICIAN ASSISTANT

## 2021-02-10 NOTE — PROGRESS NOTES
[facility] [employer] [facility]        NAME: Shabbir Boss is a 63 y o  male  : 1965    MRN: [id number]  DATE: February 10, 2021  TIME: 1:51 PM    Assessment and Plan   Injury of right foot, initial encounter [S99 927L]  1  Injury of right foot, initial encounter  XR foot 3+ vw right         Patient Instructions     Likely old fracture, 3rd DIP joint rubbing against 2nd DIP joint  Recommend follow up with podiatry - pt's  scheduled evaluation for tomorrow   Follow up with PCP in 3-5 days  Proceed to  ER if symptoms worsen  Chief Complaint     Chief Complaint   Patient presents with    Foot Pain     Right foot pain - Pt states he has irritation between the toes of his right foot for the past few weeks; he describes the pain to be raw and burning  History of Present Illness       Patient presents with complaint of right 2nd and 3rd toe pain x a couple weeks  Pt denies any known injury and describes the pain as raw and burning  He denies trying any palliative measures  Pt reports that his feet sweat a lot in his socks & shoes  He denies peeling or red skin  Review of Systems   Review of Systems   Constitutional: Negative for chills and fever  HENT: Negative for ear pain and sore throat  Eyes: Negative for pain and visual disturbance  Respiratory: Negative for cough and shortness of breath  Cardiovascular: Negative for chest pain and palpitations  Gastrointestinal: Negative for abdominal pain and vomiting  Genitourinary: Negative for dysuria and hematuria  Musculoskeletal: Positive for arthralgias  Negative for back pain  Skin: Negative for color change and rash  Neurological: Negative for seizures and syncope  All other systems reviewed and are negative          Current Medications       Current Outpatient Medications:     acetaminophen (TYLENOL) 325 mg tablet, Take 650 mg by mouth every 6 (six) hours as needed for mild pain or headaches, Disp: , Rfl:     albuterol (PROVENTIL HFA,VENTOLIN HFA) 90 mcg/act inhaler, Inhale 2 puffs every 4 (four) hours as needed for wheezing, Disp: 1 Inhaler, Rfl: 0    benzonatate (TESSALON PERLES) 100 mg capsule, Take 1 capsule (100 mg total) by mouth every 8 (eight) hours (Patient not taking: Reported on 2/10/2021), Disp: 21 capsule, Rfl: 0    nicotine (NICODERM CQ) 21 mg/24 hr TD 24 hr patch, Place 1 patch on the skin daily (Patient not taking: Reported on 2/10/2021), Disp: 28 patch, Rfl: 0    Current Allergies     Allergies as of 02/10/2021    (No Known Allergies)            The following portions of the patient's history were reviewed and updated as appropriate: allergies, current medications, past family history, past medical history, past social history, past surgical history and problem list      Past Medical History:   Diagnosis Date    [facility]        History reviewed  No pertinent surgical history  History reviewed  No pertinent family history  Medications have been verified  Objective   /74   Pulse 89   Temp 98 4 °F (36 9 °C) (Tympanic)   Resp 18   Ht 6' (1 829 m)   Wt 77 1 kg (170 lb)   SpO2 97%   BMI 23 06 kg/m²   No LMP for male patient  Physical Exam     Physical Exam  Vitals signs and nursing note reviewed  Constitutional:       General: He is not in acute distress  Appearance: Normal appearance  He is well-developed  He is not ill-appearing or diaphoretic  HENT:      Head: Normocephalic and atraumatic  Eyes:      Conjunctiva/sclera: Conjunctivae normal       Pupils: Pupils are equal, round, and reactive to light  Neck:      Musculoskeletal: Normal range of motion and neck supple  Cardiovascular:      Rate and Rhythm: Normal rate and regular rhythm  Heart sounds: Normal heart sounds  Pulmonary:      Effort: Pulmonary effort is normal  No respiratory distress  Breath sounds: Normal breath sounds     Musculoskeletal: General: Deformity (right 3rd DIP angled & rubbing against lateral aspect of 2nd toe) present  Lymphadenopathy:      Cervical: No cervical adenopathy  Skin:     General: Skin is warm and dry  Capillary Refill: Capillary refill takes less than 2 seconds  Findings: No rash  Comments: Right foot: Blistering of skin at lateral aspect of 2nd DIP joint & medial aspect of 3rd DIP joint; sensation intact; cap refill <2   Neurological:      Mental Status: He is alert and oriented to person, place, and time  Cranial Nerves: No cranial nerve deficit  Sensory: No sensory deficit  Psychiatric:         Behavior: Behavior normal          Thought Content:  Thought content normal

## 2021-07-28 ENCOUNTER — HOSPITAL ENCOUNTER (EMERGENCY)
Facility: HOSPITAL | Age: 56
Discharge: HOME | End: 2021-07-29
Attending: EMERGENCY MEDICINE | Admitting: EMERGENCY MEDICINE
Payer: COMMERCIAL

## 2021-07-28 DIAGNOSIS — Z71.41 ENCOUNTER FOR ALCOHOL REHABILITATION: Primary | ICD-10-CM

## 2021-07-28 LAB — ETHANOL EXG-MCNC: 0.18 MG/DL

## 2021-07-28 PROCEDURE — U0003 INFECTIOUS AGENT DETECTION BY NUCLEIC ACID (DNA OR RNA); SEVERE ACUTE RESPIRATORY SYNDROME CORONAVIRUS 2 (SARS-COV-2) (CORONAVIRUS DISEASE [COVID-19]), AMPLIFIED PROBE TECHNIQUE, MAKING USE OF HIGH THROUGHPUT TECHNOLOGIES AS DESCRIBED BY CMS-2020-01-R: HCPCS | Performed by: EMERGENCY MEDICINE

## 2021-07-28 PROCEDURE — 80307 DRUG TEST PRSMV CHEM ANLYZR: CPT | Performed by: EMERGENCY MEDICINE

## 2021-07-28 PROCEDURE — U0005 INFEC AGEN DETEC AMPLI PROBE: HCPCS | Performed by: EMERGENCY MEDICINE

## 2021-07-28 PROCEDURE — 99284 EMERGENCY DEPT VISIT MOD MDM: CPT | Performed by: EMERGENCY MEDICINE

## 2021-07-28 PROCEDURE — 82075 ASSAY OF BREATH ETHANOL: CPT | Performed by: EMERGENCY MEDICINE

## 2021-07-28 PROCEDURE — 99284 EMERGENCY DEPT VISIT MOD MDM: CPT

## 2021-07-28 RX ORDER — ACETAMINOPHEN 325 MG/1
650 TABLET ORAL ONCE
Status: COMPLETED | OUTPATIENT
Start: 2021-07-28 | End: 2021-07-28

## 2021-07-28 RX ADMIN — ACETAMINOPHEN 650 MG: 325 TABLET, FILM COATED ORAL at 22:47

## 2021-07-29 ENCOUNTER — HOSPITAL ENCOUNTER (EMERGENCY)
Facility: HOSPITAL | Age: 56
End: 2021-07-30
Attending: EMERGENCY MEDICINE | Admitting: EMERGENCY MEDICINE
Payer: COMMERCIAL

## 2021-07-29 VITALS
WEIGHT: 170 LBS | TEMPERATURE: 98 F | HEART RATE: 72 BPM | HEIGHT: 72 IN | SYSTOLIC BLOOD PRESSURE: 120 MMHG | RESPIRATION RATE: 18 BRPM | DIASTOLIC BLOOD PRESSURE: 68 MMHG | BODY MASS INDEX: 23.03 KG/M2 | OXYGEN SATURATION: 93 %

## 2021-07-29 DIAGNOSIS — F10.10 ALCOHOL ABUSE: Primary | ICD-10-CM

## 2021-07-29 DIAGNOSIS — J44.9 COPD (CHRONIC OBSTRUCTIVE PULMONARY DISEASE) (HCC): ICD-10-CM

## 2021-07-29 LAB
AMPHETAMINES SERPL QL SCN: NEGATIVE
BARBITURATES UR QL: NEGATIVE
BENZODIAZ UR QL: NEGATIVE
COCAINE UR QL: NEGATIVE
METHADONE UR QL: NEGATIVE
OPIATES UR QL SCN: NEGATIVE
OXYCODONE+OXYMORPHONE UR QL SCN: NEGATIVE
PCP UR QL: NEGATIVE
SARS-COV-2 RNA RESP QL NAA+PROBE: NEGATIVE
THC UR QL: NEGATIVE

## 2021-07-29 PROCEDURE — 99285 EMERGENCY DEPT VISIT HI MDM: CPT | Performed by: EMERGENCY MEDICINE

## 2021-07-29 PROCEDURE — 99284 EMERGENCY DEPT VISIT MOD MDM: CPT

## 2021-07-29 RX ORDER — ACETAMINOPHEN 325 MG/1
650 TABLET ORAL ONCE
Status: COMPLETED | OUTPATIENT
Start: 2021-07-29 | End: 2021-07-29

## 2021-07-29 RX ORDER — ALBUTEROL SULFATE 90 UG/1
4 AEROSOL, METERED RESPIRATORY (INHALATION) ONCE
Status: COMPLETED | OUTPATIENT
Start: 2021-07-29 | End: 2021-07-29

## 2021-07-29 RX ORDER — DIAZEPAM 5 MG/1
5 TABLET ORAL ONCE
Status: COMPLETED | OUTPATIENT
Start: 2021-07-29 | End: 2021-07-29

## 2021-07-29 RX ADMIN — ALBUTEROL SULFATE 4 PUFF: 90 AEROSOL, METERED RESPIRATORY (INHALATION) at 22:04

## 2021-07-29 RX ADMIN — DIAZEPAM 5 MG: 5 TABLET ORAL at 11:00

## 2021-07-29 RX ADMIN — DIAZEPAM 5 MG: 5 TABLET ORAL at 22:05

## 2021-07-29 RX ADMIN — ACETAMINOPHEN 650 MG: 325 TABLET, FILM COATED ORAL at 22:05

## 2021-07-29 RX ADMIN — ACETAMINOPHEN 650 MG: 325 TABLET, FILM COATED ORAL at 04:05

## 2021-07-29 NOTE — ED ATTENDING ATTESTATION
Final Diagnosis:  1  Encounter for alcohol rehabilitation      ED Course as of Aug 01 1743   Wed Jul 28, 2021   2210 Hx of drinking  Relapsed 10 days ago  5 shots a day  Wants to get help/rehab  Hx of homeless and recently kicked out of a shelter  Thu Jul 29, 2021   1103 Accepted at [facility]  6:30 PM          I, Martinez Mckeon MD, saw and evaluated the patient  All available labs and X-rays were ordered by me or the resident and have been reviewed by myself  I discussed the patient with the resident / non-physician and agree with the resident's / non-physician practitioner's findings and plan as documented in the resident's / non-physician practicitioner's note, except where noted  At this point, I agree with the current assessment done in the ED  I was present during key portions of all procedures performed unless otherwise stated  Chief Complaint   Patient presents with    Detox Evaluation     Patient reports that he is a chronic drinker and would like to get set up to get back into rehab     This is a 63 y o  male presenting for evaluation of HOST evaluation      PMH:   has a past medical history of COPD (chronic obstructive pulmonary disease) ([facility] )  PSH:   has no past surgical history on file      Social:  Social History     Substance and Sexual Activity   Alcohol Use Yes    Comment: Rehab 14 months - Sober      Social History     Tobacco Use   Smoking Status Current Every Day Smoker    Packs/day: 1 00    Years: 40 00    Pack years: 40 00    Types: Cigarettes   Smokeless Tobacco Never Used     Social History     Substance and Sexual Activity   Drug Use No     PE:  Vitals:    07/28/21 2116 07/29/21 0417 07/29/21 1148   BP: 148/100 147/83 120/68   BP Location: Left arm Right arm Right arm   Pulse: 100 95 72   Resp: 18 18 18   Temp: 98 °F (36 7 °C)     TempSrc: Tympanic     SpO2: 97% 98% 93%   Weight: 77 1 kg (170 lb)     Height: 6' (1 829 m)     General: VS reviewed  Appears in NAD  awake, alert  Well-nourished, well-developed  Appears stated age  Speaking normally in full sentences  Head: Normocephalic, atraumatic  Eyes: EOM-I  No diplopia  No hyphema  No subconjunctival hemorrhages  Symmetrical lids  ENT: Atraumatic external nose and ears  MMM  No malocclusion  No stridor  Normal phonation  No drooling  Normal swallowing  Neck: No JVD  CV: No pallor noted  Lungs:   No tachypnea  No respiratory distress  MSK:   FROM spontaneously  Skin: Dry, intact  Neuro: Awake, alert, GCS15, CN II-XII grossly intact  Motor grossly intact  No tremor   Psychiatric/Behavioral: Appropriate mood and affect   Exam: deferre  A:  - HOST  P:  - CRISIS    - 13 point ROS was performed and all are normal unless stated in the history above  - Nursing note reviewed  Vitals reviewed  - Orders placed by myself and/or advanced practitioner / resident     - Previous chart was reviewed  - No language barrier    - History obtained from patient  - There are no limitations to the history obtained  - Critical care time: Not applicable for this patient       Code Status: Prior  Advance Directive and Living Will:      Power of :    POLST:      Medications   acetaminophen (TYLENOL) tablet 650 mg (650 mg Oral Given 7/28/21 2247)   acetaminophen (TYLENOL) tablet 650 mg (650 mg Oral Given 7/29/21 0405)   diazepam (VALIUM) tablet 5 mg (5 mg Oral Given 7/29/21 1100)     No orders to display     Orders Placed This Encounter   Procedures    Novel Coronavirus (Covid-19),PCR [facility] - 2 Hour Stat    Rapid drug screen, urine    Consult to ED Crisis for warm handoff referral    POCT alcohol breath test     Labs Reviewed   POCT ALCOHOL BREATH TEST - Abnormal       Result Value Ref Range Status    EXTBreath Alcohol 0 182   Final   NOVEL CORONAVIRUS (COVID-19), PCR [facility] - Normal    SARS-CoV-2 Negative  Negative Final    Comment:      Narrative:         RAPID DRUG SCREEN, URINE - Normal    Amph/Meth UR Negative  Negative Final    Barbiturate Ur Negative  Negative Final    Benzodiazepine Urine Negative  Negative Final    Cocaine Urine Negative  Negative Final    Methadone Urine Negative  Negative Final    Opiate Urine Negative  Negative Final    PCP Ur Negative  Negative Final    THC Urine Negative  Negative Final    Oxycodone Urine Negative  Negative Final    Narrative:     FOR MEDICAL PURPOSES ONLY  IF CONFIRMATION NEEDED PLEASE CONTACT THE LAB WITHIN 5 DAYS  Drug Screen Cutoff Levels:  AMPHETAMINE/METHAMPHETAMINES  1000 ng/mL  BARBITURATES     200 ng/mL  BENZODIAZEPINES     200 ng/mL  COCAINE      300 ng/mL  METHADONE      300 ng/mL  OPIATES      300 ng/mL  PHENCYCLIDINE     25 ng/mL  THC       50 ng/mL  OXYCODONE      100 ng/mL     Time reflects when diagnosis was documented in both MDM as applicable and the Disposition within this note       Time User Action Codes Description Comment    7/29/2021  4:39 AM Stas Lomeli Add [Z71 41] Encounter for alcohol rehabilitation           ED Disposition       ED Disposition Condition Date/Time Comment    Discharge Stable u Jul 29, 2021  4:39 AM Presley Gar discharge to home/self care  Follow-up Information    None       Discharge Medication List as of 7/29/2021  6:55 PM        CONTINUE these medications which have NOT CHANGED    Details   acetaminophen (TYLENOL) 325 mg tablet Take 650 mg by mouth every 6 (six) hours as needed for mild pain or headaches, Historical Med      albuterol (PROVENTIL HFA,VENTOLIN HFA) 90 mcg/act inhaler Inhale 2 puffs every 4 (four) hours as needed for wheezing, Starting Fri 1/31/2020, Print      benzonatate (TESSALON PERLES) 100 mg capsule Take 1 capsule (100 mg total) by mouth every 8 (eight) hours, Starting Fri 1/31/2020, Print      nicotine (NICODERM CQ) 21 mg/24 hr TD 24 hr patch Place 1 patch on the skin daily, Starting Thu 11/7/2019, Normal           No discharge procedures on file    Prior to Admission Medications   Prescriptions Last Dose Informant Patient Reported? Taking?   acetaminophen (TYLENOL) 325 mg tablet   Yes No   Sig: Take 650 mg by mouth every 6 (six) hours as needed for mild pain or headaches   albuterol (PROVENTIL HFA,VENTOLIN HFA) 90 mcg/act inhaler   No No   Sig: Inhale 2 puffs every 4 (four) hours as needed for wheezing   benzonatate (TESSALON PERLES) 100 mg capsule   No No   Sig: Take 1 capsule (100 mg total) by mouth every 8 (eight) hours   Patient not taking: Reported on 2/10/2021   nicotine (NICODERM CQ) 21 mg/24 hr TD 24 hr patch   No No   Sig: Place 1 patch on the skin daily   Patient not taking: Reported on 2/10/2021      Facility-Administered Medications: None       Portions of the record may have been created with voice recognition software  Occasional wrong word or "sound a like" substitutions may have occurred due to the inherent limitations of voice recognition software  Read the chart carefully and recognize, using context, where substitutions have occurred      Electronically signed by:  Beni Dai

## 2021-07-29 NOTE — ED PROVIDER NOTES
History  Chief Complaint   Patient presents with    Detox Evaluation     Patient reports that he is a chronic drinker and would like to get set up to get back into rehab     40-year-old male with history of alcohol abuse presents to the emergency department requesting placement at rehab  The patient states that he has had issues with alcoholism for several years  He states that he has had moments of sobriety but recently relapsed approximately 2 weeks ago and started drinking daily  States that he has approximately 5 beers and 5 drinks of liquor per day  Reports that his last drink was approximately 3 hours prior to arrival   He denies using any other illicit substances  He has no medical complaints at this time and denies fevers, chills, nausea, vomiting, diarrhea, headache, blurry vision, tremors, sick contacts or recent travel  The patient denies prior history of withdrawal seizures  Prior to Admission Medications   Prescriptions Last Dose Informant Patient Reported? Taking?   acetaminophen (TYLENOL) 325 mg tablet   Yes No   Sig: Take 650 mg by mouth every 6 (six) hours as needed for mild pain or headaches   albuterol (PROVENTIL HFA,VENTOLIN HFA) 90 mcg/act inhaler   No No   Sig: Inhale 2 puffs every 4 (four) hours as needed for wheezing   benzonatate (TESSALON PERLES) 100 mg capsule   No No   Sig: Take 1 capsule (100 mg total) by mouth every 8 (eight) hours   Patient not taking: Reported on 2/10/2021   nicotine (NICODERM CQ) 21 mg/24 hr TD 24 hr patch   No No   Sig: Place 1 patch on the skin daily   Patient not taking: Reported on 2/10/2021      Facility-Administered Medications: None       Past Medical History:   Diagnosis Date    COPD (chronic obstructive pulmonary disease) ([facility] [address] )        History reviewed  No pertinent surgical history  History reviewed  No pertinent family history  I have reviewed and agree with the history as documented      E-Cigarette/Vaping    E-Cigarette Use Never User E-Cigarette/Vaping Substances    Nicotine No     THC No     CBD No     Flavoring No     Other No     Unknown No      Social History     Tobacco Use    Smoking status: Current Every Day Smoker     Packs/day: 1 00     Years: 40 00     Pack years: 40 00     Types: Cigarettes    Smokeless tobacco: Never Used   Vaping Use    Vaping Use: Never used   Substance Use Topics    Alcohol use: Yes     Comment: Rehab 14 months - Sober     Drug use: No        Review of Systems   Constitutional: Negative for chills and fever  HENT: Negative for ear pain and sore throat  Eyes: Negative for pain and visual disturbance  Respiratory: Negative for cough and shortness of breath  Cardiovascular: Negative for chest pain and palpitations  Gastrointestinal: Negative for abdominal pain and vomiting  Genitourinary: Negative for dysuria and hematuria  Musculoskeletal: Negative for arthralgias and back pain  Skin: Negative for color change and rash  Neurological: Negative for seizures and syncope  Psychiatric/Behavioral: Negative for suicidal ideas  All other systems reviewed and are negative  Physical Exam  ED Triage Vitals [07/28/21 2116]   Temperature Pulse Respirations Blood Pressure SpO2   98 °F (36 7 °C) 100 18 148/100 97 %      Temp Source Heart Rate Source Patient Position - Orthostatic VS BP Location FiO2 (%)   Tympanic Monitor Sitting Left arm --      Pain Score       No Pain             Orthostatic Vital Signs  Vitals:    07/28/21 2116 07/29/21 0417 07/29/21 1148   BP: 148/100 147/83 120/68   Pulse: 100 95 72   Patient Position - Orthostatic VS: Sitting Lying Lying       Physical Exam  Vitals and nursing note reviewed  Constitutional:       Appearance: He is well-developed  HENT:      Head: Normocephalic and atraumatic  Eyes:      Conjunctiva/sclera: Conjunctivae normal    Cardiovascular:      Rate and Rhythm: Normal rate and regular rhythm  Heart sounds: No murmur heard  Pulmonary:      Effort: Pulmonary effort is normal  No respiratory distress  Breath sounds: Normal breath sounds  Abdominal:      Palpations: Abdomen is soft  Tenderness: There is no abdominal tenderness  Musculoskeletal:      Cervical back: Neck supple  Skin:     General: Skin is warm and dry  Neurological:      Mental Status: He is alert  Psychiatric:         Thought Content: Thought content does not include homicidal or suicidal ideation  Thought content does not include homicidal or suicidal plan  ED Medications  Medications   acetaminophen (TYLENOL) tablet 650 mg (650 mg Oral Given 7/28/21 2247)   acetaminophen (TYLENOL) tablet 650 mg (650 mg Oral Given 7/29/21 0405)   diazepam (VALIUM) tablet 5 mg (5 mg Oral Given 7/29/21 1100)       Diagnostic Studies  Results Reviewed     Procedure Component Value Units Date/Time    Rapid drug screen, urine [[id number]]  (Normal) Collected: 07/28/21 2237    Lab Status: Final result Specimen: Urine, Clean Catch Updated: 07/29/21 0131     Amph/Meth UR Negative     Barbiturate Ur Negative     Benzodiazepine Urine Negative     Cocaine Urine Negative     Methadone Urine Negative     Opiate Urine Negative     PCP Ur Negative     THC Urine Negative     Oxycodone Urine Negative    Narrative:      FOR MEDICAL PURPOSES ONLY  IF CONFIRMATION NEEDED PLEASE CONTACT THE LAB WITHIN 5 DAYS      Drug Screen Cutoff Levels:  AMPHETAMINE/METHAMPHETAMINES  1000 ng/mL  BARBITURATES     200 ng/mL  BENZODIAZEPINES     200 ng/mL  COCAINE      300 ng/mL  METHADONE      300 ng/mL  OPIATES      300 ng/mL  PHENCYCLIDINE     25 ng/mL  THC       50 ng/mL  OXYCODONE      100 ng/mL    Novel Coronavirus (Covid-19),PCR [facility] - 2 Hour Stat [[id number]]  (Normal) Collected: 07/28/21 2232    Lab Status: Final result Specimen: Nares from Nasopharyngeal Swab Updated: 07/29/21 0107     SARS-CoV-2 Negative    Narrative:           POCT alcohol breath test [[id number]]  (Abnormal) Resulted: 07/28/21 2232    Lab Status: Final result Updated: 07/28/21 2232     EXTBreath Alcohol 0 182                 No orders to display         Procedures  Procedures      ED Course                             SBIRT 22yo+      Most Recent Value   SBIRT (23 yo +)   In order to provide better care to our patients, we are screening all of our patients for alcohol and drug use  Would it be okay to ask you these screening questions? Unable to answer at this time Filed at: 07/28/2021 2202                MDM  Number of Diagnoses or Management Options  Encounter for alcohol rehabilitation  Diagnosis management comments: 62-year-old male presented to the emergency department for placement at a alcohol rehabilitation facility  On arrival the patient was awake, alert, oriented and in no acute distress  Vital signs were stable  Crisis was consulted for a warm handoff to host   Host was not able to evaluate the patient tonight  The patient will stay in the emergency department pending host evaluation in the morning  The patient will be signed out to a different provider prior to final disposition  Disposition  Final diagnoses:   Encounter for alcohol rehabilitation     Time reflects when diagnosis was documented in both MDM as applicable and the Disposition within this note     Time User Action Codes Description Comment    7/29/2021  4:39 AM Andres Guzman Add [Z71 41] Encounter for alcohol rehabilitation       ED Disposition     ED Disposition Condition Date/Time Comment    Discharge Stable Thu Jul 29, 2021  4:39 AM Lala Nolan discharge to home/self care              Follow-up Information    None         Discharge Medication List as of 7/29/2021  6:55 PM      CONTINUE these medications which have NOT CHANGED    Details   acetaminophen (TYLENOL) 325 mg tablet Take 650 mg by mouth every 6 (six) hours as needed for mild pain or headaches, Historical Med      albuterol (PROVENTIL HFA,VENTOLIN HFA) 90 mcg/act inhaler Inhale 2 puffs every 4 (four) hours as needed for wheezing, Starting Fri 1/31/2020, Print      benzonatate (TESSALON PERLES) 100 mg capsule Take 1 capsule (100 mg total) by mouth every 8 (eight) hours, Starting Fri 1/31/2020, Print      nicotine (NICODERM CQ) 21 mg/24 hr TD 24 hr patch Place 1 patch on the skin daily, Starting Thu 11/7/2019, Normal           No discharge procedures on file  PDMP Review     None           ED Provider  Attending physically available and evaluated Alberto Alba I managed the patient along with the ED Attending      Electronically Signed by         Danny Atkinson MD  07/30/21 0123

## 2021-07-29 NOTE — ED NOTES
Host called stating that they only work until midnight and they do not have enough time to do his intake tonight  Stated that they will try to follow up in the morning  Their shift starts at 0800       Giovana Rand RN  07/28/21 7187

## 2021-07-30 VITALS
HEART RATE: 76 BPM | TEMPERATURE: 98 F | BODY MASS INDEX: 23.03 KG/M2 | WEIGHT: 170 LBS | HEIGHT: 72 IN | DIASTOLIC BLOOD PRESSURE: 87 MMHG | RESPIRATION RATE: 18 BRPM | SYSTOLIC BLOOD PRESSURE: 143 MMHG | OXYGEN SATURATION: 94 %

## 2021-07-30 RX ORDER — ACETAMINOPHEN 325 MG/1
650 TABLET ORAL ONCE
Status: COMPLETED | OUTPATIENT
Start: 2021-07-30 | End: 2021-07-30

## 2021-07-30 RX ADMIN — ACETAMINOPHEN 650 MG: 325 TABLET, FILM COATED ORAL at 08:09

## 2021-07-30 NOTE — ED NOTES
Patient is walking to get cigarettes and will return to the ED waiting room by 1330 for his  by zoe Handy RN  07/30/21 8642

## 2021-07-30 NOTE — DISCHARGE INSTRUCTIONS
[facility] has a 24-hour hotline for all human-services related emergencies  Please call [phone] for assistance and referrals if you are struggling with a mental health crisis, abuse, neglect, suicidal thoughts, evictions, substance abuse, food insecurity or any other concerns  [facility] has hotline numbers for all human-services related emergencies  These phone numbers are provided below    Crisis Intervention: [phone]  Drug & Alcohol :[phone]  Early Intervention: [phone]  Information and Referral: [address] [place] [address] Street: [address]: [address] [address]: [phone]

## 2021-07-30 NOTE — ED PROVIDER NOTES
History  Chief Complaint   Patient presents with    Detox Evaluation     Patient just seen in ED for same issue; was ready to go for rehab but did not go becuase he was feeling like he was withdrawing from alcohol     69-year-old male history of alcohol abuse and COPD presents for detox evaluation  Patient states he was evaluated yesterday in the emergency department and was set to be transferred to rehab facility however patient began experiencing symptoms of alcohol withdrawal and left before being transported  Patient then checked himself back in as he would like a 2nd detox evaluation  Patient states his last drink was a few days ago  He denies any drug use  He does endorse some mild shortness of breath as well as nonproductive cough  No fever or chills  No known sick contacts  No chest pain, abdominal pain  He does have some nausea, but denies any vomiting  He also states he is feeling jittery    He denies any history of alcohol withdrawal seizures  He states he has been without his COPD medications  Prior to Admission Medications   Prescriptions Last Dose Informant Patient Reported? Taking?   acetaminophen (TYLENOL) 325 mg tablet   Yes No   Sig: Take 650 mg by mouth every 6 (six) hours as needed for mild pain or headaches   albuterol (PROVENTIL HFA,VENTOLIN HFA) 90 mcg/act inhaler   No No   Sig: Inhale 2 puffs every 4 (four) hours as needed for wheezing   benzonatate (TESSALON PERLES) 100 mg capsule   No No   Sig: Take 1 capsule (100 mg total) by mouth every 8 (eight) hours   Patient not taking: Reported on 2/10/2021   nicotine (NICODERM CQ) 21 mg/24 hr TD 24 hr patch   No No   Sig: Place 1 patch on the skin daily   Patient not taking: Reported on 2/10/2021      Facility-Administered Medications: None       Past Medical History:   Diagnosis Date    COPD (chronic obstructive pulmonary disease) ([facility] [address] )        History reviewed  No pertinent surgical history  History reviewed   No pertinent family history  I have reviewed and agree with the history as documented  E-Cigarette/Vaping    E-Cigarette Use Never User      E-Cigarette/Vaping Substances    Nicotine No     THC No     CBD No     Flavoring No     Other No     Unknown No      Social History     Tobacco Use    Smoking status: Current Every Day Smoker     Packs/day: 1 00     Years: 40 00     Pack years: 40 00     Types: Cigarettes    Smokeless tobacco: Never Used   Vaping Use    Vaping Use: Never used   Substance Use Topics    Alcohol use: Yes     Comment: Rehab 14 months - Sober     Drug use: No        Review of Systems   Constitutional: Negative for chills and fever  Respiratory: Positive for cough and shortness of breath  Cardiovascular: Negative for chest pain and palpitations  Gastrointestinal: Positive for nausea  Negative for abdominal pain and vomiting  Neurological: Positive for headaches  Negative for seizures, syncope and light-headedness  All other systems reviewed and are negative  Physical Exam  ED Triage Vitals [07/29/21 2008]   Temperature Pulse Respirations Blood Pressure SpO2   98 °F (36 7 °C) 72 18 120/68 94 %      Temp Source Heart Rate Source Patient Position - Orthostatic VS BP Location FiO2 (%)   Tympanic Monitor Lying Right arm --      Pain Score       No Pain             Orthostatic Vital Signs  Vitals:    07/29/21 2008 07/29/21 2246   BP: 120/68    Pulse: 72 87   Patient Position - Orthostatic VS: Lying        Physical Exam  Vitals and nursing note reviewed  Constitutional:       General: He is not in acute distress  HENT:      Head: Normocephalic and atraumatic  Right Ear: External ear normal       Left Ear: External ear normal       Nose: Nose normal       Mouth/Throat:      Mouth: Mucous membranes are moist    Eyes:      Extraocular Movements: Extraocular movements intact  Conjunctiva/sclera: Conjunctivae normal       Pupils: Pupils are equal, round, and reactive to light  Cardiovascular:      Rate and Rhythm: Normal rate and regular rhythm  Pulses: Normal pulses  Heart sounds: No murmur heard  Pulmonary:      Effort: Pulmonary effort is normal       Breath sounds: No stridor  Wheezing present  Musculoskeletal:         General: No deformity  Normal range of motion  Cervical back: Normal range of motion and neck supple  No rigidity  Skin:     General: Skin is warm and dry  Capillary Refill: Capillary refill takes less than 2 seconds  Neurological:      General: No focal deficit present  Mental Status: He is alert and oriented to person, place, and time  Psychiatric:         Mood and Affect: Mood normal          Behavior: Behavior normal          ED Medications  Medications   albuterol (PROVENTIL HFA,VENTOLIN HFA) inhaler 4 puff (4 puffs Inhalation Given 7/29/21 2204)   diazepam (VALIUM) tablet 5 mg (5 mg Oral Given 7/29/21 2205)   acetaminophen (TYLENOL) tablet 650 mg (650 mg Oral Given 7/29/21 2205)       Diagnostic Studies  Results Reviewed     None                 No orders to display         Procedures  Procedures      ED Course  ED Course as of Jul 30 0159   Fri Jul 30, 2021   0010  time 2:30pm                                SBIRT 20yo+      Most Recent Value   SBIRT (25 yo +)   In order to provide better care to our patients, we are screening all of our patients for alcohol and drug use  Would it be okay to ask you these screening questions? No Filed at: 07/29/2021 2238                [facility]  Number of Diagnoses or Management Options  Alcohol abuse  COPD (chronic obstructive pulmonary disease) (HCC)  Diagnosis management comments: 59-year-old male with history of alcohol abuse presents the ED for evaluation for possible alcohol detox  On evaluation, patient has coarse breath sounds with diffuse wheezing, but is in no acute distress  He is speaking in full sentences    Patient does state that he is nauseous, has mild headache, and is feeling jittery  Will treat headache with Tylenol, give Valium for alcohol withdrawal, and treat wheezing with albuterol inhaler  Patient is medically cleared for host evaluation  Patient is accepted at [facility] pending transport tomorrow at 2:30 p m  Rodriguez Glass Disposition  Final diagnoses:   Alcohol abuse   COPD (chronic obstructive pulmonary disease) ([facility] [address] )     Time reflects when diagnosis was documented in both MDM as applicable and the Disposition within this note     Time User Action Codes Description Comment    7/29/2021 11:54 PM Check, [employer] Add [F10 10] Alcohol abuse     7/29/2021 11:54 PM Check, [employer] Add [J44 9] COPD (chronic obstructive pulmonary disease) [facility])       ED Disposition     ED Disposition Condition Date/Time Comment    Transfer to [facility] Jul 29, 2021 11:54 PM Erlin Klein should be transferred out to detox and has been medically cleared  Follow-up Information    None         Patient's Medications   Discharge Prescriptions    No medications on file     No discharge procedures on file  PDMP Review     None           ED Provider  Attending physically available and evaluated Erlin Klein  TANMAY managed the patient along with the ED Attending      Electronically Signed by         Cortney Brooks MD  07/30/21 1266

## 2021-07-30 NOTE — ED NOTES
CW called HOST to let them know patient is back for detox bed and to see if bed at [facility] is still available  They will call me back as soon as they have an answer

## 2021-07-30 NOTE — ED NOTES
Per Durenda Klinefelter at [facility] is still willing to accept patient but there is no transport for tonight possible   Patient is to call [phone] press 2 then press 1 and hold until someone answers to set up transport for tomorrow am

## 2021-07-30 NOTE — ED ATTENDING ATTESTATION
7/29/2021  I saw and evaluated the patient  I have discussed the patient with the resident physician and agree with the resident's findings, assessment and plan as documented in the resident physician's note, unless otherwise documented below  All available laboratory and imaging studies were reviewed by myself  I was present for key portions of any procedure(s) performed by the resident and I was immediately available to provide assistance  I agree with the current assessment done in the Emergency Department  I have conducted an independent evaluation of this patient  Barrington Riley is a 63 y o  male with PMH of COPD, alcohol abuse, presenting with request for medications to help with alcohol withdrawal and request for detox  Patient was evaluated in the ED yesterday with plan to go to rehab today, however, when his ride to rehab arrived, patient did not go since he felt nauseated, shaky and jittery, as well as somewhat short of breath  Last drink has been several days ago  Patient now feels jittery and nervous  He denies history of DTs  He has not had any inhalers for his COPD for a while, does have mild dyspnea and non-productive cough  There is no chest pain  No vomiting, No abdominal pain  No recent illnesses, fevers or chills  Denies drug use, reports alcohol problems  Review of Systems  Constitutional: denies fevers, chills  Visual/Eyes: no changes in vision  HENT: no rhinorrhea, no sore throat  Cardiac: no chest pain  Respiratory: reports cough and some shortness of breath particularly with walking  GI: no abdominal pain, some nausea, no vomiting  Heme/Onc: no easy bruising  Neuro: no focal weakness or numbness, has a mild headache, no history of seizures      Ten systems reviewed and negative unless otherwise noted in HPI and above    Physical Exam  Vitals:    07/29/21 2008 07/29/21 2246   BP: 120/68    TempSrc: Tympanic    Pulse: 72 87   Resp: 18 17   Patient Position - Orthostatic VS: Lying Temp: 98 °F (36 7 °C)      SPO2 RA Rest      ED from 7/29/2021 in [address] Emergency Department   SpO2  94 %   SpO2 Activity  At Rest   O2 Device  None (Room air)   O2 Flow Rate  --        Constitutional:  Awake, alert, oriented  No acute distress  HEENT:  Normocephalic, atraumatic  Sclera anicteric, conjunctiva not injected  Moist oral mucosa  Cardiac:  Regular rate and rhythm, no murmurs, rubs, or gallops  2+ radial pulses  Respiratory: Good air entry  There is diffuse end expiratory wheezing heard throughout lung fields posteriorly  Abdomen:  Nondistended  Bowel sounds present  No tenderness to palpation  No rebound or guarding  Extremities:  No deformities, no edema  Integument:  No rashes over exposed areas, cap refill less than 2 seconds  Neurologic:  Awake, alert, and oriented x3  Nonfocal exam   Psychiatric: Somewhat anxious, pleasant, no SI        Diagnostic Studies  Results Reviewed     None          No orders to display       Procedures  Procedures            ED Course  Medications   albuterol (PROVENTIL HFA,VENTOLIN HFA) inhaler 4 puff (4 puffs Inhalation Given 7/29/21 2204)   diazepam (VALIUM) tablet 5 mg (5 mg Oral Given 7/29/21 2205)   acetaminophen (TYLENOL) tablet 650 mg (650 mg Oral Given 7/29/21 255)     63year old gentleman presenting with shakiness and anxiety in setting of cessation of chronic alcohol use as well as with wheezing, shortness of breath and cough in setting of COPD and tobacco use  No rhonchi or other findings on exam to suggest pneumonia  Albuterol inhaler administered  Valium administered for anxiety with improvement in symptoms  Tylenol administered for headache  Patient is accepted at [facility] inpatient rehab, transport tomorrow at 1430      Clinical Impression  Final diagnoses:   Alcohol abuse   COPD (chronic obstructive pulmonary disease) ([facility] [address]

## 2021-08-02 ENCOUNTER — HOSPITAL ENCOUNTER (EMERGENCY)
Facility: HOSPITAL | Age: 56
Discharge: HOME | End: 2021-08-03
Attending: EMERGENCY MEDICINE | Admitting: EMERGENCY MEDICINE
Payer: COMMERCIAL

## 2021-08-02 ENCOUNTER — APPOINTMENT (EMERGENCY)
Dept: RADIOLOGY | Facility: HOSPITAL | Age: 56
End: 2021-08-02
Payer: COMMERCIAL

## 2021-08-02 VITALS
OXYGEN SATURATION: 92 % | SYSTOLIC BLOOD PRESSURE: 136 MMHG | DIASTOLIC BLOOD PRESSURE: 80 MMHG | TEMPERATURE: 97.3 F | RESPIRATION RATE: 16 BRPM | HEART RATE: 105 BPM

## 2021-08-02 DIAGNOSIS — F10.929 ALCOHOL INTOXICATION (HCC): Primary | ICD-10-CM

## 2021-08-02 LAB
ETHANOL EXG-MCNC: 0.12 MG/DL
ETHANOL EXG-MCNC: 0.21 MG/DL
ETHANOL EXG-MCNC: 0.23 MG/DL

## 2021-08-02 PROCEDURE — 82075 ASSAY OF BREATH ETHANOL: CPT

## 2021-08-02 PROCEDURE — 70450 CT HEAD/BRAIN W/O DYE: CPT

## 2021-08-02 PROCEDURE — 99284 EMERGENCY DEPT VISIT MOD MDM: CPT | Performed by: EMERGENCY MEDICINE

## 2021-08-02 PROCEDURE — G1004 CDSM NDSC: HCPCS

## 2021-08-02 PROCEDURE — 99284 EMERGENCY DEPT VISIT MOD MDM: CPT

## 2021-08-02 RX ORDER — SODIUM CHLORIDE, SODIUM GLUCONATE, SODIUM ACETATE, POTASSIUM CHLORIDE, MAGNESIUM CHLORIDE, SODIUM PHOSPHATE, DIBASIC, AND POTASSIUM PHOSPHATE .53; .5; .37; .037; .03; .012; .00082 G/100ML; G/100ML; G/100ML; G/100ML; G/100ML; G/100ML; G/100ML
1000 INJECTION, SOLUTION INTRAVENOUS ONCE
Status: DISCONTINUED | OUTPATIENT
Start: 2021-08-02 | End: 2021-08-02

## 2021-08-02 NOTE — ED PROVIDER NOTES
History  Chief Complaint   Patient presents with    Alcohol Intoxication     pt found by police publically intoxicated and stumbling in street; EMS reports 2 weeks straight of drinking     Patient is a 41-year-old male with past medical history significant for chronic alcoholism and COPD presenting to the ED via EMS for alcohol intoxication  Per EMS they state that the patient was found wandering somebody called  they brought him in  Patient states he drank half a pt of vodka today and struggles with chronic alcoholism  Patient denies any pain at this time, denies falling  Patient denies any head pain, headache, chest pain, shortness of breath, abdominal pain, any nausea, vomiting diarrhea, constipation  Patient does state he just urinate himself  Prior to Admission Medications   Prescriptions Last Dose Informant Patient Reported? Taking?   acetaminophen (TYLENOL) 325 mg tablet   Yes No   Sig: Take 650 mg by mouth every 6 (six) hours as needed for mild pain or headaches   albuterol (PROVENTIL HFA,VENTOLIN HFA) 90 mcg/act inhaler   No No   Sig: Inhale 2 puffs every 4 (four) hours as needed for wheezing   benzonatate (TESSALON PERLES) 100 mg capsule   No No   Sig: Take 1 capsule (100 mg total) by mouth every 8 (eight) hours   Patient not taking: Reported on 2/10/2021   nicotine (NICODERM CQ) 21 mg/24 hr TD 24 hr patch   No No   Sig: Place 1 patch on the skin daily   Patient not taking: Reported on 2/10/2021      Facility-Administered Medications: None       Past Medical History:   Diagnosis Date    COPD (chronic obstructive pulmonary disease) ([facility] [address] )        No past surgical history on file  No family history on file  I have reviewed and agree with the history as documented      E-Cigarette/Vaping    E-Cigarette Use Never User      E-Cigarette/Vaping Substances    Nicotine No     THC No     CBD No     Flavoring No     Other No     Unknown No      Social History     Tobacco Use    Smoking status: Current Every Day Smoker     Packs/day: 1 00     Years: 40 00     Pack years: 40 00     Types: Cigarettes    Smokeless tobacco: Never Used   Vaping Use    Vaping Use: Never used   Substance Use Topics    Alcohol use: Yes     Comment: Rehab 14 months - Sober     Drug use: No        Review of Systems   Constitutional: Negative for fatigue  Respiratory: Negative for chest tightness and shortness of breath  Cardiovascular: Negative for chest pain and palpitations  Gastrointestinal: Negative for abdominal pain, constipation, diarrhea, nausea and vomiting  Genitourinary: Negative for difficulty urinating  Musculoskeletal: Negative for back pain  Skin: Negative for color change  Neurological: Negative for dizziness, weakness and headaches  Psychiatric/Behavioral: Negative for agitation and behavioral problems  All other systems reviewed and are negative  Physical Exam  ED Triage Vitals [08/02/21 1157]   Temperature Pulse Respirations Blood Pressure SpO2   (!) 97 3 °F (36 3 °C) 102 18 152/87 96 %      Temp Source Heart Rate Source Patient Position - Orthostatic VS BP Location FiO2 (%)   Tympanic Monitor Lying Left arm --      Pain Score       --             Orthostatic Vital Signs  Vitals:    08/02/21 1157 08/02/21 2126   BP: 152/87 136/80   Pulse: 102 105   Patient Position - Orthostatic VS: Lying        Physical Exam  Vitals and nursing note reviewed  Constitutional:       Appearance: Normal appearance  He is normal weight  HENT:      Head: Normocephalic  Right Ear: External ear normal       Left Ear: External ear normal       Nose: Nose normal       Mouth/Throat:      Mouth: Mucous membranes are dry  Eyes:      Extraocular Movements: Extraocular movements intact  Neck:      Comments: No C spine tenderness to palpation midline  Cardiovascular:      Rate and Rhythm: Normal rate and regular rhythm  Pulses: Normal pulses  Heart sounds: Normal heart sounds  Pulmonary:      Effort: Pulmonary effort is normal       Breath sounds: Normal breath sounds  Abdominal:      General: Abdomen is flat  Bowel sounds are normal       Palpations: Abdomen is soft  Tenderness: There is abdominal tenderness  There is rebound  There is no guarding  Musculoskeletal:         General: Normal range of motion  Cervical back: Normal range of motion  Skin:     General: Skin is warm and dry  Capillary Refill: Capillary refill takes 2 to 3 seconds  Neurological:      General: No focal deficit present  Mental Status: He is alert  Comments: Intoxicated    Psychiatric:         Mood and Affect: Mood normal       Comments: Intoxicated          ED Medications  Medications - No data to display    Diagnostic Studies  Results Reviewed     Procedure Component Value Units Date/Time    POCT alcohol breath test [[id number]]     Lab Status: No result     POCT alcohol breath test [[id number]]  (Normal) Resulted: 08/02/21 1826    Lab Status: Final result Updated: 08/02/21 1826     EXTBreath Alcohol 0 210    POCT alcohol breath test [[id number]]  (Normal) Resulted: 08/02/21 1603    Lab Status: Final result Updated: 08/02/21 1603     EXTBreath Alcohol 0 225                 CT head without contrast   ED Interpretation by Junior Glover DO (08/02 1344)   No signs of acute bleeds       Final Result by Chuck Miranda MD (08/02 1417)      No acute intracranial abnormality  Workstation performed: [id number]               Procedures  Procedures      ED Course  ED Course as of Aug 02 2157   Mon Aug 02, 2021   1215 Patient with chronic alcoholism presenting via EMS for public intoxication  States he drank 1/2 pint of vodka today  Denies falling, states hes in no pain  Denies, headache, dizziness, chest pain, sob, abd pain, n/v/d/c  Patient aware that he is in the hospital  Is aggravated that he has handcuffs on       PE: Heart sounds normal, Lungs no signs of wheezing, rales or rhonchi, abd soft non tender non distended  Denies any midline tenderness to palpation  No hirsch sign or raccoon eyes  Right occipital laceration present, dry blood not actively bleeding  Patient with full ROM of extremities  Dry mucous membranes  Patient smells of urine  DDX: alcohol intoxication, brain bleed     Will evaluate with CT head w/o contrast, will treat with IVF, Alcohol breath test and follow  Allow patient to sleep it off        1420 Patient asleep resting in bed comfortably  No issues at this time  1529 Patient re-evaluated asleep, tried to wake him patient is still drunk      1558 Patient eating and drinking fluids       1652 Patient still intoxicated; sleeping  No pain or issues at this time  Will continue to monitor       1701 Will get another POC alcohol breath test in a few hours  1843 Patient re-evaluated patient asleep resting appeared in NAD: still intoxicated; breathalyzer results   [address] Patient sleeping      2111 Patient able to ambulate  Doesn't have a way to get home  Has no complaints at this time  Will order another breath test      2141 Need to get blood alcohol level down before allowing him to leave by himself                                           MDM    Disposition  Final diagnoses:   None     ED Disposition     None      Follow-up Information    None         Patient's Medications   Discharge Prescriptions    No medications on file     No discharge procedures on file  PDMP Review     None           ED Provider  Attending physically available and evaluated Denice Livingston I managed the patient along with the ED Attending      Electronically Signed by         Brittni Howe DO  08/02/21 8860

## 2021-08-02 NOTE — ED ATTENDING ATTESTATION
8/2/2021  I, Marybeth Beltran DO, saw and evaluated the patient  I have discussed the patient with the resident/non-physician practitioner and agree with the resident's/non-physician practitioner's findings, Plan of Care, and MDM as documented in the resident's/non-physician practitioner's note, except where noted  All available labs and Radiology studies were reviewed  I was present for key portions of any procedure(s) performed by the resident/non-physician practitioner and I was immediately available to provide assistance  At this point I agree with the current assessment done in the Emergency Department  I have conducted an independent evaluation of this patient a history and physical is as follows:    63 yom alcohol intoxication, brought after found wandering  Chronic alcoholic  Evidence of head trauma with occipital laceration that appears old  Does not want to talk to me just wants to sleep  No other evidence of trauma  Patient offers no complaints  A/P: intoxicated, old trauma  No thinners  Ct head   Observe until sober and reasess    ED Course         Critical Care Time  Procedures

## 2021-08-03 ENCOUNTER — HOSPITAL ENCOUNTER (EMERGENCY)
Facility: HOSPITAL | Age: 56
Discharge: HOME | End: 2021-08-04
Attending: EMERGENCY MEDICINE
Payer: COMMERCIAL

## 2021-08-03 DIAGNOSIS — F10.10 ALCOHOL ABUSE: Primary | ICD-10-CM

## 2021-08-03 LAB
AMPHETAMINES SERPL QL SCN: NEGATIVE
BARBITURATES UR QL: NEGATIVE
BENZODIAZ UR QL: NEGATIVE
COCAINE UR QL: NEGATIVE
ETHANOL EXG-MCNC: 0.17 MG/DL
METHADONE UR QL: NEGATIVE
OPIATES UR QL SCN: NEGATIVE
OXYCODONE+OXYMORPHONE UR QL SCN: NEGATIVE
PCP UR QL: NEGATIVE
SARS-COV-2 RNA RESP QL NAA+PROBE: NEGATIVE
THC UR QL: NEGATIVE

## 2021-08-03 PROCEDURE — 94644 CONT INHLJ TX 1ST HOUR: CPT

## 2021-08-03 PROCEDURE — U0003 INFECTIOUS AGENT DETECTION BY NUCLEIC ACID (DNA OR RNA); SEVERE ACUTE RESPIRATORY SYNDROME CORONAVIRUS 2 (SARS-COV-2) (CORONAVIRUS DISEASE [COVID-19]), AMPLIFIED PROBE TECHNIQUE, MAKING USE OF HIGH THROUGHPUT TECHNOLOGIES AS DESCRIBED BY CMS-2020-01-R: HCPCS

## 2021-08-03 PROCEDURE — 99285 EMERGENCY DEPT VISIT HI MDM: CPT

## 2021-08-03 PROCEDURE — 82075 ASSAY OF BREATH ETHANOL: CPT

## 2021-08-03 PROCEDURE — U0005 INFEC AGEN DETEC AMPLI PROBE: HCPCS

## 2021-08-03 PROCEDURE — 94760 N-INVAS EAR/PLS OXIMETRY 1: CPT

## 2021-08-03 PROCEDURE — 80307 DRUG TEST PRSMV CHEM ANLYZR: CPT

## 2021-08-03 PROCEDURE — 99284 EMERGENCY DEPT VISIT MOD MDM: CPT | Performed by: EMERGENCY MEDICINE

## 2021-08-03 RX ORDER — SODIUM CHLORIDE FOR INHALATION 0.9 %
3 VIAL, NEBULIZER (ML) INHALATION ONCE
Status: COMPLETED | OUTPATIENT
Start: 2021-08-03 | End: 2021-08-03

## 2021-08-03 RX ORDER — ACETAMINOPHEN 325 MG/1
975 TABLET ORAL ONCE
Status: COMPLETED | OUTPATIENT
Start: 2021-08-03 | End: 2021-08-03

## 2021-08-03 RX ORDER — DIAZEPAM 5 MG/1
5 TABLET ORAL ONCE
Status: COMPLETED | OUTPATIENT
Start: 2021-08-03 | End: 2021-08-03

## 2021-08-03 RX ADMIN — ISODIUM CHLORIDE 3 ML: 0.03 SOLUTION RESPIRATORY (INHALATION) at 15:30

## 2021-08-03 RX ADMIN — ACETAMINOPHEN 975 MG: 325 TABLET, FILM COATED ORAL at 18:49

## 2021-08-03 RX ADMIN — DIAZEPAM 5 MG: 5 TABLET ORAL at 18:49

## 2021-08-03 RX ADMIN — ALBUTEROL SULFATE 10 MG: 2.5 SOLUTION RESPIRATORY (INHALATION) at 15:30

## 2021-08-03 RX ADMIN — IPRATROPIUM BROMIDE 1 MG: 0.5 SOLUTION RESPIRATORY (INHALATION) at 15:30

## 2021-08-03 NOTE — ED PROVIDER NOTES
History  Chief Complaint   Patient presents with    Shortness of Breath     SOB s/p drinking alcohol  interested in rehab      62 yo M with COPD and alcohol abuse presents via EMS with shortness of breath and seeking alcohol rehab  Patient was seen in our ED last night and a few days prior seeking help for alcohol use  The crisis center reached out to Host, and found a bed in rehab for him but he left prior to being transferred because he felt like he was withdrawing from alcohol, and wanted to drink  He felt anxious and tremorous and left the hospital this morning to drink vodka  He was found laying on the ground by EMS, and was complaining of difficulty breathing  He complains of a productive cough that brings up clear mucus  He denies any headache, chest pain, or abdominal pain  He states that he is ready to go to alcohol rehab now because he states that "I'm going to die if I don't go "           Prior to Admission Medications   Prescriptions Last Dose Informant Patient Reported? Taking?   acetaminophen (TYLENOL) 325 mg tablet   Yes Yes   Sig: Take 650 mg by mouth every 6 (six) hours as needed for mild pain or headaches   albuterol (PROVENTIL HFA,VENTOLIN HFA) 90 mcg/act inhaler 8/3/2021 at Unknown time  No Yes   Sig: Inhale 2 puffs every 4 (four) hours as needed for wheezing   benzonatate (TESSALON PERLES) 100 mg capsule Not Taking at Unknown time  No No   Sig: Take 1 capsule (100 mg total) by mouth every 8 (eight) hours   Patient not taking: Reported on 2/10/2021   nicotine (NICODERM CQ) 21 mg/24 hr TD 24 hr patch Not Taking at Unknown time  No No   Sig: Place 1 patch on the skin daily   Patient not taking: Reported on 2/10/2021      Facility-Administered Medications: None       Past Medical History:   Diagnosis Date    COPD (chronic obstructive pulmonary disease) ([facility] [address] )        No past surgical history on file  No family history on file    I have reviewed and agree with the history as documented  E-Cigarette/Vaping    E-Cigarette Use Never User      E-Cigarette/Vaping Substances    Nicotine No     THC No     CBD No     Flavoring No     Other No     Unknown No      Social History     Tobacco Use    Smoking status: Current Every Day Smoker     Packs/day: 1 00     Years: 40 00     Pack years: 40 00     Types: Cigarettes    Smokeless tobacco: Never Used   Vaping Use    Vaping Use: Never used   Substance Use Topics    Alcohol use: Yes     Comment: Rehab 14 months - Sober     Drug use: No        Review of Systems   Constitutional: Negative for chills and fever  HENT: Negative for ear pain and sore throat  Eyes: Negative for pain and visual disturbance  Respiratory: Positive for cough and shortness of breath  Cardiovascular: Negative for chest pain and palpitations  Gastrointestinal: Negative for abdominal pain, constipation and diarrhea  Genitourinary: Negative for dysuria and hematuria  Musculoskeletal: Negative for arthralgias and back pain  Skin: Negative for color change and rash  Neurological: Negative for seizures and syncope  Psychiatric/Behavioral: Positive for agitation  The patient is nervous/anxious  All other systems reviewed and are negative  Physical Exam  ED Triage Vitals [08/03/21 1455]   Temperature Pulse Respirations Blood Pressure SpO2   98 3 °F (36 8 °C) 102 16 137/77 95 %      Temp Source Heart Rate Source Patient Position - Orthostatic VS BP Location FiO2 (%)   Tympanic Monitor Lying Left arm --      Pain Score       No Pain             Orthostatic Vital Signs  Vitals:    08/03/21 1530 08/03/21 1934 08/03/21 2245 08/04/21 0549   BP:  136/73 135/76 150/72   Pulse: 99 101 89 73   Patient Position - Orthostatic VS:  Lying         Physical Exam  Vitals and nursing note reviewed  Constitutional:       Appearance: He is not ill-appearing or diaphoretic  Comments: Sleepy but arrousable and responding to questioning   Appears intoxicated HENT:      Head: Normocephalic and atraumatic  Eyes:      Conjunctiva/sclera: Conjunctivae normal    Cardiovascular:      Rate and Rhythm: Normal rate and regular rhythm  Heart sounds: No murmur heard  Pulmonary:      Effort: Pulmonary effort is normal  No respiratory distress  Breath sounds: Normal breath sounds  No decreased breath sounds or wheezing  Abdominal:      Palpations: Abdomen is soft  There is no mass  Tenderness: There is no abdominal tenderness  Musculoskeletal:      Cervical back: Neck supple  Right lower leg: No edema  Left lower leg: No edema  Comments: Multiple abrasions to his back   Skin:     General: Skin is warm and dry  Capillary Refill: Capillary refill takes less than 2 seconds  Neurological:      Mental Status: He is easily aroused           ED Medications  Medications   sodium chloride 0 9 % inhalation solution 3 mL (3 mL Nebulization Given 8/3/21 1530)   albuterol inhalation solution 10 mg (10 mg Nebulization Given 8/3/21 1530)     And   ipratropium (ATROVENT) 0 02 % inhalation solution 1 mg (1 mg Nebulization Given 8/3/21 1530)     And   sodium chloride 0 9 % inhalation solution 3 mL (3 mL Nebulization Given 8/3/21 1530)   acetaminophen (TYLENOL) tablet 975 mg (975 mg Oral Given 8/3/21 1849)   diazepam (VALIUM) tablet 5 mg (5 mg Oral Given 8/3/21 1849)   diazepam (VALIUM) tablet 5 mg (5 mg Oral Given 8/4/21 0555)       Diagnostic Studies  Results Reviewed     Procedure Component Value Units Date/Time    Novel Coronavirus [facility] [[id number]]  (Normal) Collected: 08/03/21 1639    Lab Status: Final result Specimen: Nares from Nose Updated: 08/03/21 1755     SARS-CoV-2 Negative    Narrative:           Rapid drug screen, urine [[id number]]  (Normal) Collected: 08/03/21 1533    Lab Status: Final result Specimen: Urine, Clean Catch Updated: 08/03/21 1622     Amph/Meth UR Negative     Barbiturate Ur Negative     Benzodiazepine Urine Negative     Cocaine Urine Negative     Methadone Urine Negative     Opiate Urine Negative     PCP Ur Negative     THC Urine Negative     Oxycodone Urine Negative    Narrative:      FOR MEDICAL PURPOSES ONLY  IF CONFIRMATION NEEDED PLEASE CONTACT THE LAB WITHIN 5 DAYS  Drug Screen Cutoff Levels:  AMPHETAMINE/METHAMPHETAMINES  1000 ng/mL  BARBITURATES     200 ng/mL  BENZODIAZEPINES     200 ng/mL  COCAINE      300 ng/mL  METHADONE      300 ng/mL  OPIATES      300 ng/mL  PHENCYCLIDINE     25 ng/mL  THC       50 ng/mL  OXYCODONE      100 ng/mL    POCT alcohol breath test [[id number]]  (Normal) Resulted: 08/03/21 1531    Lab Status: Final result Updated: 08/03/21 1531     EXTBreath Alcohol 0 171                 No orders to display         Procedures  Procedures      ED Course  ED Course as of Aug 06 0855   Tue Aug 03, 2021   1536 Contacted Crisis to inform them of patient  They will contact Host  Warm hand off consult placed  MDM  Number of Diagnoses or Management Options  Alcohol abuse  Diagnosis management comments: 62 yo M with COPD and alcohol abuse presents via EMS with shortness of breath and seeking alcohol rehab  Seen multiple times over the last few days for the same complaints  Left before being transferred to rehab  Crisis team and Host contacted  Covid test, POC etoh breathalyzer, UDS ordered per Crisis  Breathing treatment given due to patient's SOB  Once this is complete and labs result, patient is cleared to go to rehab  Tylenol and diazepam given for symptomatic control  Patient signed out after symptomatic control given         Disposition  Final diagnoses:   Alcohol abuse     Time reflects when diagnosis was documented in both MDM as applicable and the Disposition within this note     Time User Action Codes Description Comment    8/3/2021 10:02 PM Keyon Dunn Add [F10 10] Alcohol abuse       ED Disposition     ED Disposition Condition Date/Time Comment    Discharge  Wed Aug 4, 2021 10:03 AM       Follow-up Information    None         Discharge Medication List as of 8/4/2021 10:04 AM      CONTINUE these medications which have NOT CHANGED    Details   acetaminophen (TYLENOL) 325 mg tablet Take 650 mg by mouth every 6 (six) hours as needed for mild pain or headaches, Historical Med      albuterol (PROVENTIL HFA,VENTOLIN HFA) 90 mcg/act inhaler Inhale 2 puffs every 4 (four) hours as needed for wheezing, Starting Fri 1/31/2020, Print      benzonatate (TESSALON PERLES) 100 mg capsule Take 1 capsule (100 mg total) by mouth every 8 (eight) hours, Starting Fri 1/31/2020, Print      nicotine (NICODERM CQ) 21 mg/24 hr TD 24 hr patch Place 1 patch on the skin daily, Starting Thu 11/7/2019, Normal           No discharge procedures on file  PDMP Review     None           ED Provider  Attending physically available and evaluated Dilma Ledesma  TANMAY managed the patient along with the ED Attending      Electronically Signed by         Terrance Durbin MD  08/06/21 0897       Terrance Durbin MD  08/06/21 4261

## 2021-08-03 NOTE — ED NOTES
[facility] attempted to complete assessment with patient however he was resistant and didn't want to answer "any stupid questions"  HOST informed patient he could tell the nurse if/when he decided to complete their assessment for treatment       IVETT Angeles  08/03/21   3133

## 2021-08-03 NOTE — ED NOTES
Call received from Eleanor Munoz at [facility], stating [facility] has a bed on hold for patient and he needs to complete the assessment with them within 2 hours       IVETT James  08/03/21 1940

## 2021-08-03 NOTE — ED ATTENDING ATTESTATION
Final Diagnosis:  1  Alcohol abuse           Tammy DONATO MD, saw and evaluated the patient  All available labs and X-rays were ordered by me or the resident and have been reviewed by myself  I discussed the patient with the resident / non-physician and agree with the resident's / non-physician practitioner's findings and plan as documented in the resident's / non-physician practicitioner's note, except where noted  At this point, I agree with the current assessment done in the ED  I was present during key portions of all procedures performed unless otherwise stated  Chief Complaint   Patient presents with    Shortness of Breath     SOB s/p drinking alcohol  interested in rehab      This is a 63 y o  male presenting for evaluation of SOB + ETOH withdrawal  He was here last night  He had a workup done including a CTH which didn't show anything  He left despite being given resources  He was found with SOB, difficulty breathing, then brought it  No f/ch/n/v  He would like inpatient rehab if possible  No headache  No CP  Chronic unchanged cough in setting of COPD  Homeless  PMH:   has a past medical history of COPD (chronic obstructive pulmonary disease) ([facility] [address] )  PSH:   has no past surgical history on file      Social:  Social History     Substance and Sexual Activity   Alcohol Use Yes    Comment: Rehab 14 months - Sober      Social History     Tobacco Use   Smoking Status Current Every Day Smoker    Packs/day: 1 00    Years: 40 00    Pack years: 40 00    Types: Cigarettes   Smokeless Tobacco Never Used     Social History     Substance and Sexual Activity   Drug Use No     PE:  Vitals:    08/03/21 1455 08/03/21 1530 08/03/21 1934   BP: 137/77  136/73   BP Location: Left arm  Left arm   Pulse: 102 99 101   Resp: 16  16   Temp: 98 3 °F (36 8 °C)     TempSrc: Tympanic     SpO2: 95% 96% 95%   Weight: 77 1 kg (169 lb 15 6 oz)     Height: 6' (1 829 m)     General: VS reviewed  Appears in NAD  awake, alert  Well-nourished, well-developed  Appears stated age  Speaking normally in full sentences  Head: Normocephalic, atraumatic  Eyes: EOM-I  No diplopia  No hyphema  No subconjunctival hemorrhages  Symmetrical lids  ENT: Atraumatic external nose and ears  MMM  No malocclusion  No stridor  Normal phonation  No drooling  Normal swallowing  Neck: No JVD  CV: No pallor noted  Lungs:   Wheezing slightly bilaterally  Mild tachypnea  MSK:   FROM spontaneously  Skin: Dry, extensive abrasions on the back  Neuro: Awake, alert, GCS15, CN II-XII grossly intact  Motor grossly intact  Psychiatric/Behavioral: Appropriate mood and affect   Exam: deferred  A:  - Alcohol abuse  - wheezing   P:  - symptomatic measures  - COVID  - HOST      - 13 point ROS was performed and all are normal unless stated in the history above  - Nursing note reviewed  Vitals reviewed  - Orders placed by myself and/or advanced practitioner / resident     - Previous chart was reviewed  - No language barrier    - History obtained from patient  - There are no limitations to the history obtained  - Critical care time: Not applicable for this patient       Code Status: Prior  Advance Directive and Living Will:      Power of :    POLST:      Medications   sodium chloride 0 9 % inhalation solution 3 mL (3 mL Nebulization Given 8/3/21 1530)   albuterol inhalation solution 10 mg (10 mg Nebulization Given 8/3/21 1530)     And   ipratropium (ATROVENT) 0 02 % inhalation solution 1 mg (1 mg Nebulization Given 8/3/21 1530)     And   sodium chloride 0 9 % inhalation solution 3 mL (3 mL Nebulization Given 8/3/21 1530)   acetaminophen (TYLENOL) tablet 975 mg (975 mg Oral Given 8/3/21 1849)   diazepam (VALIUM) tablet 5 mg (5 mg Oral Given 8/3/21 1849)     No orders to display     Orders Placed This Encounter   Procedures    [facility]    Rapid drug screen, urine    Consult to ED Crisis for warm handoff referral    POCT alcohol breath test     Labs Reviewed   NOVEL CORONAVIRUS (COVID-19), PCR [facility] - Normal       Result Value Ref Range Status    SARS-CoV-2 Negative  Negative Final    Comment:      Narrative:         RAPID DRUG SCREEN, URINE - Normal    Amph/Meth UR Negative  Negative Final    Barbiturate Ur Negative  Negative Final    Benzodiazepine Urine Negative  Negative Final    Cocaine Urine Negative  Negative Final    Methadone Urine Negative  Negative Final    Opiate Urine Negative  Negative Final    PCP Ur Negative  Negative Final    THC Urine Negative  Negative Final    Oxycodone Urine Negative  Negative Final    Narrative:     FOR MEDICAL PURPOSES ONLY  IF CONFIRMATION NEEDED PLEASE CONTACT THE LAB WITHIN 5 DAYS  Drug Screen Cutoff Levels:  AMPHETAMINE/METHAMPHETAMINES  1000 ng/mL  BARBITURATES     200 ng/mL  BENZODIAZEPINES     200 ng/mL  COCAINE      300 ng/mL  METHADONE      300 ng/mL  OPIATES      300 ng/mL  PHENCYCLIDINE     25 ng/mL  THC       50 ng/mL  OXYCODONE      100 ng/mL   POCT ALCOHOL BREATH TEST - Normal    EXTBreath Alcohol 0 171   Final     Time reflects when diagnosis was documented in both MDM as applicable and the Disposition within this note     Time User Action Codes Description Comment    8/3/2021 10:02 PM Avila Craig Add [F10 10] Alcohol abuse       ED Disposition     ED Disposition Condition Date/Time Comment    Transfer to [address] [place] [address] Aug 3, 2021 10:01 PM       Follow-up Information    None       Patient's Medications   Discharge Prescriptions    No medications on file     No discharge procedures on file  Prior to Admission Medications   Prescriptions Last Dose Informant Patient Reported?  Taking?   acetaminophen (TYLENOL) 325 mg tablet   Yes No   Sig: Take 650 mg by mouth every 6 (six) hours as needed for mild pain or headaches   albuterol (PROVENTIL HFA,VENTOLIN HFA) 90 mcg/act inhaler   No No   Sig: Inhale 2 puffs every 4 (four) hours as needed for wheezing   benzonatate (TESSALON PERLES) 100 mg capsule   No No   Sig: Take 1 capsule (100 mg total) by mouth every 8 (eight) hours   Patient not taking: Reported on 2/10/2021   nicotine (NICODERM CQ) 21 mg/24 hr TD 24 hr patch   No No   Sig: Place 1 patch on the skin daily   Patient not taking: Reported on 2/10/2021      Facility-Administered Medications: None       Portions of the record may have been created with voice recognition software  Occasional wrong word or "sound a like" substitutions may have occurred due to the inherent limitations of voice recognition software  Read the chart carefully and recognize, using context, where substitutions have occurred      Electronically signed by:  Jay Edwards

## 2021-08-03 NOTE — ED NOTES
[facility] to [facility], [facility]  HOST will call shortly to speak with patient and discuss inpatient treatment       IVETT Cantor  08/03/21   [id number]

## 2021-08-04 VITALS
DIASTOLIC BLOOD PRESSURE: 72 MMHG | HEART RATE: 73 BPM | WEIGHT: 169.97 LBS | TEMPERATURE: 98.3 F | OXYGEN SATURATION: 96 % | SYSTOLIC BLOOD PRESSURE: 150 MMHG | RESPIRATION RATE: 16 BRPM | HEIGHT: 72 IN | BODY MASS INDEX: 23.02 KG/M2

## 2021-08-04 RX ORDER — DIAZEPAM 5 MG/1
5 TABLET ORAL ONCE
Status: COMPLETED | OUTPATIENT
Start: 2021-08-04 | End: 2021-08-04

## 2021-08-04 RX ADMIN — DIAZEPAM 5 MG: 5 TABLET ORAL at 05:55

## 2021-08-04 NOTE — ED NOTES
[facility] [place] location  Call placed to [facility] to confirm bed is available tomorrow  They have arranged transport to pick patient up at the hospital at [address] [place] [address]       Tulio Fan, [facility]  08/03/21   [phone]

## 2021-11-06 ENCOUNTER — HOSPITAL ENCOUNTER (EMERGENCY)
Facility: HOSPITAL | Age: 56
Discharge: HOME | End: 2021-11-06
Attending: EMERGENCY MEDICINE
Payer: COMMERCIAL

## 2021-11-06 VITALS
DIASTOLIC BLOOD PRESSURE: 93 MMHG | TEMPERATURE: 98.9 F | SYSTOLIC BLOOD PRESSURE: 137 MMHG | HEART RATE: 68 BPM | OXYGEN SATURATION: 99 % | RESPIRATION RATE: 18 BRPM

## 2021-11-06 DIAGNOSIS — F10.929 ALCOHOL INTOXICATION (HCC): Primary | ICD-10-CM

## 2021-11-06 PROCEDURE — 99282 EMERGENCY DEPT VISIT SF MDM: CPT | Performed by: EMERGENCY MEDICINE

## 2021-11-06 PROCEDURE — 99284 EMERGENCY DEPT VISIT MOD MDM: CPT

## 2021-11-12 ENCOUNTER — HOSPITAL ENCOUNTER (EMERGENCY)
Facility: HOSPITAL | Age: 56
Discharge: HOME | End: 2021-11-12
Attending: EMERGENCY MEDICINE
Payer: COMMERCIAL

## 2021-11-12 VITALS
BODY MASS INDEX: 23.02 KG/M2 | WEIGHT: 169.97 LBS | OXYGEN SATURATION: 92 % | SYSTOLIC BLOOD PRESSURE: 126 MMHG | TEMPERATURE: 97 F | DIASTOLIC BLOOD PRESSURE: 74 MMHG | HEIGHT: 72 IN | RESPIRATION RATE: 14 BRPM | HEART RATE: 100 BPM

## 2021-11-12 DIAGNOSIS — F10.929 ALCOHOL INTOXICATION (HCC): Primary | ICD-10-CM

## 2021-11-12 PROCEDURE — 99284 EMERGENCY DEPT VISIT MOD MDM: CPT

## 2021-11-12 PROCEDURE — 99284 EMERGENCY DEPT VISIT MOD MDM: CPT | Performed by: EMERGENCY MEDICINE

## 2021-11-12 RX ORDER — OLANZAPINE 10 MG/1
10 TABLET, ORALLY DISINTEGRATING ORAL ONCE
Status: COMPLETED | OUTPATIENT
Start: 2021-11-12 | End: 2021-11-12

## 2021-11-12 RX ADMIN — OLANZAPINE 10 MG: 10 TABLET, ORALLY DISINTEGRATING ORAL at 19:58

## 2021-11-14 ENCOUNTER — HOSPITAL ENCOUNTER (EMERGENCY)
Facility: HOSPITAL | Age: 56
End: 2021-11-15
Attending: EMERGENCY MEDICINE
Payer: COMMERCIAL

## 2021-11-14 DIAGNOSIS — F19.10 SUBSTANCE ABUSE (HCC): ICD-10-CM

## 2021-11-14 DIAGNOSIS — F10.239 ALCOHOL WITHDRAWAL (HCC): Primary | ICD-10-CM

## 2021-11-14 PROCEDURE — 99284 EMERGENCY DEPT VISIT MOD MDM: CPT | Performed by: EMERGENCY MEDICINE

## 2021-11-14 PROCEDURE — 82075 ASSAY OF BREATH ETHANOL: CPT | Performed by: EMERGENCY MEDICINE

## 2021-11-14 PROCEDURE — 99285 EMERGENCY DEPT VISIT HI MDM: CPT

## 2021-11-14 RX ORDER — IBUPROFEN 400 MG/1
400 TABLET ORAL ONCE
Status: COMPLETED | OUTPATIENT
Start: 2021-11-14 | End: 2021-11-15

## 2021-11-15 VITALS
SYSTOLIC BLOOD PRESSURE: 147 MMHG | TEMPERATURE: 98 F | HEART RATE: 112 BPM | RESPIRATION RATE: 18 BRPM | OXYGEN SATURATION: 97 % | DIASTOLIC BLOOD PRESSURE: 86 MMHG

## 2021-11-15 LAB
ETHANOL EXG-MCNC: 0.05 MG/DL
ETHANOL EXG-MCNC: 0.14 MG/DL
FLUAV RNA RESP QL NAA+PROBE: NEGATIVE
FLUBV RNA RESP QL NAA+PROBE: NEGATIVE
RSV RNA RESP QL NAA+PROBE: NEGATIVE
SARS-COV-2 RNA RESP QL NAA+PROBE: NEGATIVE

## 2021-11-15 PROCEDURE — 0241U HB NFCT DS VIR RESP RNA 4 TRGT: CPT | Performed by: EMERGENCY MEDICINE

## 2021-11-15 PROCEDURE — 82075 ASSAY OF BREATH ETHANOL: CPT | Performed by: EMERGENCY MEDICINE

## 2021-11-15 RX ORDER — ACETAMINOPHEN 325 MG/1
975 TABLET ORAL ONCE
Status: COMPLETED | OUTPATIENT
Start: 2021-11-15 | End: 2021-11-15

## 2021-11-15 RX ORDER — DIAZEPAM 5 MG/1
5 TABLET ORAL ONCE
Status: COMPLETED | OUTPATIENT
Start: 2021-11-15 | End: 2021-11-15

## 2021-11-15 RX ADMIN — IBUPROFEN 400 MG: 400 TABLET ORAL at 00:01

## 2021-11-15 RX ADMIN — DIAZEPAM 5 MG: 5 TABLET ORAL at 08:10

## 2021-11-15 RX ADMIN — DIAZEPAM 5 MG: 5 TABLET ORAL at 13:51

## 2021-11-15 RX ADMIN — ACETAMINOPHEN 975 MG: 325 TABLET, FILM COATED ORAL at 13:51

## 2022-05-07 ENCOUNTER — APPOINTMENT (EMERGENCY)
Dept: RADIOLOGY | Facility: HOSPITAL | Age: 57
End: 2022-05-07
Payer: COMMERCIAL

## 2022-05-07 ENCOUNTER — HOSPITAL ENCOUNTER (EMERGENCY)
Facility: HOSPITAL | Age: 57
Discharge: HOME | End: 2022-05-07
Attending: EMERGENCY MEDICINE | Admitting: EMERGENCY MEDICINE
Payer: COMMERCIAL

## 2022-05-07 VITALS
WEIGHT: 170.19 LBS | TEMPERATURE: 97 F | OXYGEN SATURATION: 100 % | HEART RATE: 94 BPM | DIASTOLIC BLOOD PRESSURE: 86 MMHG | BODY MASS INDEX: 23.08 KG/M2 | RESPIRATION RATE: 20 BRPM | SYSTOLIC BLOOD PRESSURE: 126 MMHG

## 2022-05-07 DIAGNOSIS — J44.1 COPD EXACERBATION (HCC): Primary | ICD-10-CM

## 2022-05-07 DIAGNOSIS — U07.1 COVID-19: ICD-10-CM

## 2022-05-07 PROBLEM — E87.1 HYPONATREMIA: Status: ACTIVE | Noted: 2022-05-07

## 2022-05-07 LAB
ALBUMIN SERPL BCP-MCNC: 4.7 G/DL (ref 3–5.2)
ALP SERPL-CCNC: 55 U/L (ref 43–122)
ALT SERPL W P-5'-P-CCNC: 59 U/L
ANION GAP SERPL CALCULATED.3IONS-SCNC: 10 MMOL/L (ref 5–14)
ANION GAP SERPL CALCULATED.3IONS-SCNC: 11 MMOL/L (ref 5–14)
AST SERPL W P-5'-P-CCNC: 87 U/L (ref 17–59)
ATRIAL RATE: 97 BPM
BASOPHILS # BLD AUTO: 0.04 THOUSANDS/ΜL (ref 0–0.1)
BASOPHILS NFR BLD AUTO: 0 % (ref 0–1)
BILIRUB SERPL-MCNC: 1.24 MG/DL
BUN SERPL-MCNC: 8 MG/DL (ref 5–25)
BUN SERPL-MCNC: 8 MG/DL (ref 5–25)
CALCIUM SERPL-MCNC: 8.1 MG/DL (ref 8.4–10.2)
CALCIUM SERPL-MCNC: 8.5 MG/DL (ref 8.4–10.2)
CARDIAC TROPONIN I PNL SERPL HS: 5 NG/L
CHLORIDE SERPL-SCNC: 98 MMOL/L (ref 97–108)
CHLORIDE SERPL-SCNC: 99 MMOL/L (ref 97–108)
CK MB SERPL-MCNC: 6.8 NG/ML (ref 0–2.4)
CK MB SERPL-MCNC: <1 % (ref 0–2.5)
CK SERPL-CCNC: 783 U/L (ref 55–170)
CO2 SERPL-SCNC: 22 MMOL/L (ref 22–30)
CO2 SERPL-SCNC: 25 MMOL/L (ref 22–30)
CREAT SERPL-MCNC: 0.5 MG/DL (ref 0.7–1.5)
CREAT SERPL-MCNC: 0.58 MG/DL (ref 0.7–1.5)
EOSINOPHIL # BLD AUTO: 0.02 THOUSAND/ΜL (ref 0–0.61)
EOSINOPHIL NFR BLD AUTO: 0 % (ref 0–6)
ERYTHROCYTE [DISTWIDTH] IN BLOOD BY AUTOMATED COUNT: 15.2 % (ref 11.6–15.1)
FLUAV RNA RESP QL NAA+PROBE: NEGATIVE
FLUBV RNA RESP QL NAA+PROBE: NEGATIVE
GFR SERPL CREATININE-BSD FRML MDRD: 113 ML/MIN/1.73SQ M
GFR SERPL CREATININE-BSD FRML MDRD: 120 ML/MIN/1.73SQ M
GLUCOSE SERPL-MCNC: 79 MG/DL (ref 70–99)
GLUCOSE SERPL-MCNC: 89 MG/DL (ref 70–99)
HCT VFR BLD AUTO: 48.2 % (ref 36.5–49.3)
HGB BLD-MCNC: 15.8 G/DL (ref 12–17)
IMM GRANULOCYTES # BLD AUTO: 0.03 THOUSAND/UL (ref 0–0.2)
IMM GRANULOCYTES NFR BLD AUTO: 0 % (ref 0–2)
LIPASE SERPL-CCNC: 92 U/L (ref 23–300)
LYMPHOCYTES # BLD AUTO: 2.5 THOUSANDS/ΜL (ref 0.6–4.47)
LYMPHOCYTES NFR BLD AUTO: 26 % (ref 14–44)
MCH RBC QN AUTO: 32.3 PG (ref 26.8–34.3)
MCHC RBC AUTO-ENTMCNC: 32.8 G/DL (ref 31.4–37.4)
MCV RBC AUTO: 99 FL (ref 82–98)
MONOCYTES # BLD AUTO: 0.83 THOUSAND/ΜL (ref 0.17–1.22)
MONOCYTES NFR BLD AUTO: 9 % (ref 4–12)
NEUTROPHILS # BLD AUTO: 6.38 THOUSANDS/ΜL (ref 1.85–7.62)
NEUTS SEG NFR BLD AUTO: 65 % (ref 43–75)
NRBC BLD AUTO-RTO: 0 /100 WBCS
NT-PROBNP SERPL-MCNC: 91.4 PG/ML (ref 0–299)
P AXIS: 79 DEGREES
PLATELET # BLD AUTO: 302 THOUSANDS/UL (ref 149–390)
PMV BLD AUTO: 8.2 FL (ref 8.9–12.7)
POTASSIUM SERPL-SCNC: 4 MMOL/L (ref 3.6–5)
POTASSIUM SERPL-SCNC: 5.7 MMOL/L (ref 3.6–5)
PR INTERVAL: 140 MS
PROT SERPL-MCNC: 8 G/DL (ref 5.9–8.4)
QRS AXIS: 82 DEGREES
QRSD INTERVAL: 84 MS
QT INTERVAL: 342 MS
QTC INTERVAL: 434 MS
RBC # BLD AUTO: 4.89 MILLION/UL (ref 3.88–5.62)
RSV RNA RESP QL NAA+PROBE: NEGATIVE
SARS-COV-2 RNA RESP QL NAA+PROBE: POSITIVE
SODIUM SERPL-SCNC: 131 MMOL/L (ref 137–147)
SODIUM SERPL-SCNC: 134 MMOL/L (ref 137–147)
T WAVE AXIS: 73 DEGREES
VENTRICULAR RATE: 97 BPM
WBC # BLD AUTO: 9.8 THOUSAND/UL (ref 4.31–10.16)

## 2022-05-07 PROCEDURE — 93010 ELECTROCARDIOGRAM REPORT: CPT | Performed by: INTERNAL MEDICINE

## 2022-05-07 PROCEDURE — 83690 ASSAY OF LIPASE: CPT | Performed by: PHYSICIAN ASSISTANT

## 2022-05-07 PROCEDURE — 96374 THER/PROPH/DIAG INJ IV PUSH: CPT

## 2022-05-07 PROCEDURE — 99285 EMERGENCY DEPT VISIT HI MDM: CPT

## 2022-05-07 PROCEDURE — 80048 BASIC METABOLIC PNL TOTAL CA: CPT | Performed by: PHYSICIAN ASSISTANT

## 2022-05-07 PROCEDURE — 84484 ASSAY OF TROPONIN QUANT: CPT | Performed by: PHYSICIAN ASSISTANT

## 2022-05-07 PROCEDURE — 82553 CREATINE MB FRACTION: CPT | Performed by: PHYSICIAN ASSISTANT

## 2022-05-07 PROCEDURE — 94664 DEMO&/EVAL PT USE INHALER: CPT

## 2022-05-07 PROCEDURE — 93005 ELECTROCARDIOGRAM TRACING: CPT

## 2022-05-07 PROCEDURE — 80053 COMPREHEN METABOLIC PANEL: CPT | Performed by: PHYSICIAN ASSISTANT

## 2022-05-07 PROCEDURE — 96361 HYDRATE IV INFUSION ADD-ON: CPT

## 2022-05-07 PROCEDURE — 36415 COLL VENOUS BLD VENIPUNCTURE: CPT | Performed by: PHYSICIAN ASSISTANT

## 2022-05-07 PROCEDURE — 0241U HB NFCT DS VIR RESP RNA 4 TRGT: CPT | Performed by: PHYSICIAN ASSISTANT

## 2022-05-07 PROCEDURE — 94760 N-INVAS EAR/PLS OXIMETRY 1: CPT

## 2022-05-07 PROCEDURE — 85025 COMPLETE CBC W/AUTO DIFF WBC: CPT | Performed by: PHYSICIAN ASSISTANT

## 2022-05-07 PROCEDURE — 71045 X-RAY EXAM CHEST 1 VIEW: CPT

## 2022-05-07 PROCEDURE — 82550 ASSAY OF CK (CPK): CPT | Performed by: PHYSICIAN ASSISTANT

## 2022-05-07 PROCEDURE — 94644 CONT INHLJ TX 1ST HOUR: CPT

## 2022-05-07 PROCEDURE — 99284 EMERGENCY DEPT VISIT MOD MDM: CPT | Performed by: PHYSICIAN ASSISTANT

## 2022-05-07 PROCEDURE — 83880 ASSAY OF NATRIURETIC PEPTIDE: CPT | Performed by: PHYSICIAN ASSISTANT

## 2022-05-07 RX ORDER — ALBUTEROL SULFATE 2.5 MG/3ML
2.5 SOLUTION RESPIRATORY (INHALATION) ONCE
Status: COMPLETED | OUTPATIENT
Start: 2022-05-07 | End: 2022-05-07

## 2022-05-07 RX ORDER — ALBUTEROL SULFATE 90 UG/1
2 AEROSOL, METERED RESPIRATORY (INHALATION) ONCE
Status: COMPLETED | OUTPATIENT
Start: 2022-05-07 | End: 2022-05-07

## 2022-05-07 RX ORDER — SODIUM CHLORIDE FOR INHALATION 0.9 %
12 VIAL, NEBULIZER (ML) INHALATION ONCE
Status: COMPLETED | OUTPATIENT
Start: 2022-05-07 | End: 2022-05-07

## 2022-05-07 RX ORDER — PREDNISONE 10 MG/1
TABLET ORAL
Qty: 30 TABLET | Refills: 0 | Status: SHIPPED | OUTPATIENT
Start: 2022-05-07 | End: 2022-05-18 | Stop reason: ALTCHOICE

## 2022-05-07 RX ORDER — METHYLPREDNISOLONE SODIUM SUCCINATE 125 MG/2ML
125 INJECTION, POWDER, LYOPHILIZED, FOR SOLUTION INTRAMUSCULAR; INTRAVENOUS ONCE
Status: COMPLETED | OUTPATIENT
Start: 2022-05-07 | End: 2022-05-07

## 2022-05-07 RX ORDER — SODIUM CHLORIDE 9 MG/ML
250 INJECTION, SOLUTION INTRAVENOUS CONTINUOUS
Status: DISCONTINUED | OUTPATIENT
Start: 2022-05-07 | End: 2022-05-07 | Stop reason: HOSPADM

## 2022-05-07 RX ORDER — ALBUTEROL SULFATE 90 UG/1
2 AEROSOL, METERED RESPIRATORY (INHALATION) EVERY 6 HOURS PRN
Qty: 8.5 G | Refills: 0 | Status: SHIPPED | OUTPATIENT
Start: 2022-05-07

## 2022-05-07 RX ADMIN — ALBUTEROL SULFATE 2 PUFF: 90 AEROSOL, METERED RESPIRATORY (INHALATION) at 19:13

## 2022-05-07 RX ADMIN — ALBUTEROL SULFATE 10 MG: 2.5 SOLUTION RESPIRATORY (INHALATION) at 16:34

## 2022-05-07 RX ADMIN — ALBUTEROL SULFATE 2.5 MG: 2.5 SOLUTION RESPIRATORY (INHALATION) at 16:09

## 2022-05-07 RX ADMIN — METHYLPREDNISOLONE SODIUM SUCCINATE 125 MG: 125 INJECTION, POWDER, FOR SOLUTION INTRAMUSCULAR; INTRAVENOUS at 16:26

## 2022-05-07 RX ADMIN — SODIUM CHLORIDE 250 ML/HR: 0.9 INJECTION, SOLUTION INTRAVENOUS at 16:26

## 2022-05-07 RX ADMIN — IPRATROPIUM BROMIDE 1 MG: 0.5 SOLUTION RESPIRATORY (INHALATION) at 16:34

## 2022-05-07 RX ADMIN — IPRATROPIUM BROMIDE 0.5 MG: 0.5 SOLUTION RESPIRATORY (INHALATION) at 16:09

## 2022-05-07 RX ADMIN — ISODIUM CHLORIDE 12 ML: 0.03 SOLUTION RESPIRATORY (INHALATION) at 16:33

## 2022-05-07 NOTE — ED PROVIDER NOTES
History  Chief Complaint   Patient presents with    Shortness of Breath     Pt with productive cough and sob and wheezing for several days , no chest pain, no leg swelling , no nausea       Cough  Cough characteristics:  Productive  Sputum characteristics:  Nondescript  Severity:  Mild  Onset quality:  Gradual  Duration:  2 days  Timing:  Constant  Progression:  Unchanged  Chronicity:  New  Smoker: no    Context: not animal exposure    Relieved by:  Nothing  Worsened by:  Nothing  Ineffective treatments:  None tried  Associated symptoms: shortness of breath and wheezing    Risk factors: no chemical exposure, no recent infection and no recent travel        Prior to Admission Medications   Prescriptions Last Dose Informant Patient Reported? Taking?   acetaminophen (TYLENOL) 325 mg tablet   Yes No   Sig: Take 650 mg by mouth every 6 (six) hours as needed for mild pain or headaches   albuterol (PROVENTIL HFA,VENTOLIN HFA) 90 mcg/act inhaler   No No   Sig: Inhale 2 puffs every 4 (four) hours as needed for wheezing   benzonatate (TESSALON PERLES) 100 mg capsule   No No   Sig: Take 1 capsule (100 mg total) by mouth every 8 (eight) hours   Patient not taking: Reported on 2/10/2021   nicotine (NICODERM CQ) 21 mg/24 hr TD 24 hr patch   No No   Sig: Place 1 patch on the skin daily   Patient not taking: Reported on 2/10/2021      Facility-Administered Medications: None       Past Medical History:   Diagnosis Date    COPD (chronic obstructive pulmonary disease) ([facility] [address] )        History reviewed  No pertinent surgical history  History reviewed  No pertinent family history  I have reviewed and agree with the history as documented      E-Cigarette/Vaping    E-Cigarette Use Never User      E-Cigarette/Vaping Substances    Nicotine No     THC No     CBD No     Flavoring No     Other No     Unknown No      Social History     Tobacco Use    Smoking status: Current Every Day Smoker     Packs/day: 1 00     Years: 40 00     Pack years: 40 00     Types: Cigarettes    Smokeless tobacco: Never Used   Vaping Use    Vaping Use: Never used   Substance Use Topics    Alcohol use: Yes     Comment: Rehab 14 months - Sober     Drug use: No       Review of Systems   Constitutional: Negative  HENT: Positive for congestion  Eyes: Negative  Respiratory: Positive for cough, shortness of breath and wheezing  Cardiovascular: Negative  Gastrointestinal: Negative  Endocrine: Negative  Genitourinary: Negative  Musculoskeletal: Negative  Skin: Negative  Allergic/Immunologic: Negative  Neurological: Negative  Hematological: Negative  Psychiatric/Behavioral: Negative  All other systems reviewed and are negative  Physical Exam  Physical Exam  Vitals and nursing note reviewed  Constitutional:       Appearance: Normal appearance  He is normal weight  Comments: 1900  Increase airway  cta no rrw  Pt feeling normal with his breathing    HENT:      Head: Normocephalic and atraumatic  Right Ear: Tympanic membrane, ear canal and external ear normal       Left Ear: Tympanic membrane, ear canal and external ear normal       Nose: Nose normal       Mouth/Throat:      Mouth: Mucous membranes are moist       Pharynx: Oropharynx is clear  Eyes:      Extraocular Movements: Extraocular movements intact  Conjunctiva/sclera: Conjunctivae normal       Pupils: Pupils are equal, round, and reactive to light  Cardiovascular:      Rate and Rhythm: Normal rate and regular rhythm  Pulses: Normal pulses  Heart sounds: Normal heart sounds  Pulmonary:      Effort: Pulmonary effort is normal       Comments: Coarse  Breath sounds all fields,  Minor wheezing all fields   Abdominal:      General: Abdomen is flat  Bowel sounds are normal       Palpations: Abdomen is soft  Musculoskeletal:         General: Normal range of motion  Cervical back: Normal range of motion and neck supple     Skin:     General: Skin is warm  Capillary Refill: Capillary refill takes less than 2 seconds  Neurological:      General: No focal deficit present  Mental Status: He is alert and oriented to person, place, and time     Psychiatric:         Mood and Affect: Mood normal          Behavior: Behavior normal          Vital Signs  ED Triage Vitals [05/07/22 1601]   Temperature Pulse Respirations Blood Pressure SpO2   (!) 97 °F (36 1 °C) 100 22 (!) 136/104 (!) 88 %      Temp Source Heart Rate Source Patient Position - Orthostatic VS BP Location FiO2 (%)   Tympanic Monitor Sitting Left arm --      Pain Score       --           Vitals:    05/07/22 1630 05/07/22 1715 05/07/22 1745 05/07/22 1900   BP: 126/86      Pulse: 89 87 96 94   Patient Position - Orthostatic VS:             Visual Acuity      ED Medications  Medications   sodium chloride 0 9 % infusion (0 mL/hr Intravenous Stopped 5/7/22 1905)   albuterol inhalation solution 2 5 mg (2 5 mg Nebulization Given 5/7/22 1609)   ipratropium (ATROVENT) 0 02 % inhalation solution 0 5 mg (0 5 mg Nebulization Given 5/7/22 1609)   methylPREDNISolone sodium succinate (Solu-MEDROL) injection 125 mg (125 mg Intravenous Given 5/7/22 1626)   albuterol inhalation solution 10 mg (10 mg Nebulization Given 5/7/22 1634)   ipratropium (ATROVENT) 0 02 % inhalation solution 1 mg (1 mg Nebulization Given 5/7/22 1634)   sodium chloride 0 9 % inhalation solution 12 mL (12 mL Nebulization Given 5/7/22 1633)   albuterol (PROVENTIL HFA,VENTOLIN HFA) inhaler 2 puff (2 puffs Inhalation Given 5/7/22 1913)       Diagnostic Studies  Results Reviewed     Procedure Component Value Units Date/Time    HS Troponin I 4hr [[id number]]     Lab Status: No result Specimen: Blood     Basic metabolic panel [[id number]]  (Abnormal) Collected: 05/07/22 1752    Lab Status: Final result Specimen: Blood from Arm, Left Updated: 05/07/22 1812     Sodium 134 mmol/L      Potassium 4 0 mmol/L      Chloride 98 mmol/L      CO2 25 mmol/L ANION GAP 11 mmol/L      BUN 8 mg/dL      Creatinine 0 58 mg/dL      Glucose 79 mg/dL      Calcium 8 5 mg/dL      eGFR 113 ml/min/1 73sq m     Narrative:      [place] guidelines for Chronic Kidney Disease (CKD):     Stage 1 with normal or high GFR (GFR > 90 mL/min/1 73 square meters)    Stage 2 Mild CKD (GFR = 60-89 mL/min/1 73 square meters)    Stage 3A Moderate CKD (GFR = 45-59 mL/min/1 73 square meters)    Stage 3B Moderate CKD (GFR = 30-44 mL/min/1 73 square meters)    Stage 4 Severe CKD (GFR = 15-29 mL/min/1 73 square meters)    Stage 5 End Stage CKD (GFR <15 mL/min/1 73 square meters)  Note: GFR calculation is accurate only with a steady state creatinine    CKMB [[id number]]  (Abnormal) Collected: 05/07/22 1708    Lab Status: Final result Specimen: Blood from Arm, Right Updated: 05/07/22 1743     CK-MB Index <1 0 %      CK-MB 6 8 ng/mL     NT-BNP PRO [[id number]]  (Normal) Collected: 05/07/22 1708    Lab Status: Final result Specimen: Blood from Arm, Right Updated: 05/07/22 1737     NT-proBNP 91 4 pg/mL     HS Troponin I 2hr [[id number]]     Lab Status: No result Specimen: Blood     HS Troponin 0hr (reflex protocol) [[id number]]  (Normal) Collected: 05/07/22 1614    Lab Status: Final result Specimen: Blood from Arm, Right Updated: 05/07/22 1735     hs TnI 0hr 5 ng/L     Lipase [[id number]]  (Normal) Collected: 05/07/22 1708    Lab Status: Final result Specimen: Blood from Arm, Right Updated: 05/07/22 1732     Lipase 92 u/L     Narrative:      Gross Hemolysis    CK (with reflex to MB) [[id number]]  (Abnormal) Collected: 05/07/22 1708    Lab Status: Final result Specimen: Blood from Arm, Right Updated: 05/07/22 1732     Total  U/L     Narrative:      Gross Hemolysis    Comprehensive metabolic panel [[id number]]  (Abnormal) Collected: 05/07/22 1708    Lab Status: Final result Specimen: Blood from Arm, Right Updated: 05/07/22 1732     Sodium 131 mmol/L      Potassium 5 7 mmol/L Chloride 99 mmol/L      CO2 22 mmol/L      ANION GAP 10 mmol/L      BUN 8 mg/dL      Creatinine 0 50 mg/dL      Glucose 89 mg/dL      Calcium 8 1 mg/dL      AST 87 U/L      ALT 59 U/L      Alkaline Phosphatase 55 U/L      Total Protein 8 0 g/dL      Albumin 4 7 g/dL      Total Bilirubin 1 24 mg/dL      eGFR 120 ml/min/1 73sq m     Narrative:      Gross Hemolysis  National Kidney Disease Foundation guidelines for Chronic Kidney Disease (CKD):     Stage 1 with normal or high GFR (GFR > 90 mL/min/1 73 square meters)    Stage 2 Mild CKD (GFR = 60-89 mL/min/1 73 square meters)    Stage 3A Moderate CKD (GFR = 45-59 mL/min/1 73 square meters)    Stage 3B Moderate CKD (GFR = 30-44 mL/min/1 73 square meters)    Stage 4 Severe CKD (GFR = 15-29 mL/min/1 73 square meters)    Stage 5 End Stage CKD (GFR <15 mL/min/1 73 square meters)  Note: GFR calculation is accurate only with a steady state creatinine    COVID/FLU/RSV [[id number]]  (Abnormal) Collected: 05/07/22 1617    Lab Status: Final result Specimen: Nares from Nose Updated: 05/07/22 1701     SARS-CoV-2 Positive     INFLUENZA A PCR Negative     INFLUENZA B PCR Negative     RSV PCR Negative    Narrative:      FOR PEDIATRIC PATIENTS - copy/paste COVID Guidelines URL to browser: [email or web address]    SARS-CoV-2 assay is a Nucleic Acid Amplification assay intended for the  qualitative detection of nucleic acid from SARS-CoV-2 in nasopharyngeal  swabs  Results are for the presumptive identification of SARS-CoV-2 RNA  Positive results are indicative of infection with SARS-CoV-2, the virus  causing COVID-19, but do not rule out bacterial infection or co-infection  with other viruses  Laboratories within the [place] and its  territories are required to report all positive results to the appropriate  public health authorities   Negative results do not preclude SARS-CoV-2  infection and should not be used as the sole basis for treatment or other  patient management decisions  Negative results must be combined with  clinical observations, patient history, and epidemiological information  This test has not been FDA cleared or approved  This test has been authorized by FDA under an Emergency Use Authorization  (EUA)  This test is only authorized for the duration of time the  declaration that circumstances exist justifying the authorization of the  emergency use of an in vitro diagnostic tests for detection of SARS-CoV-2  virus and/or diagnosis of COVID-19 infection under section 564(b)(1) of  the Act, 21 U  S C  262TLW-2(C)(9), unless the authorization is terminated  or revoked sooner  The test has been validated but independent review by FDA  and CLIA is pending  Test performed using [employer] GeneXpert: This RT-PCR assay targets N2,  a region unique to SARS-CoV-2  A conserved region in the E-gene was chosen  for pan-Sarbecovirus detection which includes SARS-CoV-2  CBC and differential [[id number]]  (Abnormal) Collected: 05/07/22 1614    Lab Status: Final result Specimen: Blood from Arm, Right Updated: 05/07/22 1623     WBC 9 80 Thousand/uL      RBC 4 89 Million/uL      Hemoglobin 15 8 g/dL      Hematocrit 48 2 %      MCV 99 fL      MCH 32 3 pg      MCHC 32 8 g/dL      RDW 15 2 %      MPV 8 2 fL      Platelets 275 Thousands/uL      nRBC 0 /100 WBCs      Neutrophils Relative 65 %      Immat GRANS % 0 %      Lymphocytes Relative 26 %      Monocytes Relative 9 %      Eosinophils Relative 0 %      Basophils Relative 0 %      Neutrophils Absolute 6 38 Thousands/µL      Immature Grans Absolute 0 03 Thousand/uL      Lymphocytes Absolute 2 50 Thousands/µL      Monocytes Absolute 0 83 Thousand/µL      Eosinophils Absolute 0 02 Thousand/µL      Basophils Absolute 0 04 Thousands/µL                  XR chest 1 view portable   Final Result by Denton Nichols MD (05/07 1742)   No acute cardiopulmonary findings  Workstation performed: [id number]                    Procedures  Procedures         ED Course                                             MDM    Disposition  Final diagnoses:   COPD exacerbation ([facility] [address] )   COVID-19     Time reflects when diagnosis was documented in both MDM as applicable and the Disposition within this note     Time User Action Codes Description Comment    5/7/2022  7:15 PM Demario Oneill  Add [J44 1] COPD exacerbation ([address] )     5/7/2022  7:15 PM Myra Diana  Add [U07 1] COVID-19       ED Disposition     ED Disposition Condition Date/Time Comment    Discharge Stable Sat May 7, 2022  7:17 PM Khai Montana discharge to home/self care  Follow-up Information     Follow up With Specialties Details Why [address], [place] [address]  [phone]            Discharge Medication List as of 5/7/2022  7:17 PM      START taking these medications    Details   !! albuterol (ProAir HFA) 90 mcg/act inhaler Inhale 2 puffs every 6 (six) hours as needed for wheezing, Starting Sat 5/7/2022, Print      predniSONE 10 mg tablet 5 tabs po qd x 2 days then 4 tabs po qd x 2 days then 3 tabs po qd x 2 days then 2 tabs po qd x 2 days then 1 tab po qd x 2 days, Print       !! - Potential duplicate medications found  Please discuss with provider        CONTINUE these medications which have NOT CHANGED    Details   acetaminophen (TYLENOL) 325 mg tablet Take 650 mg by mouth every 6 (six) hours as needed for mild pain or headaches, Historical Med      !! albuterol (PROVENTIL HFA,VENTOLIN HFA) 90 mcg/act inhaler Inhale 2 puffs every 4 (four) hours as needed for wheezing, Starting Fri 1/31/2020, Print      benzonatate (TESSALON PERLES) 100 mg capsule Take 1 capsule (100 mg total) by mouth every 8 (eight) hours, Starting Fri 1/31/2020, Print      nicotine (NICODERM CQ) 21 mg/24 hr TD 24 hr patch Place 1 patch on the skin daily, Starting Thu 11/7/2019, Normal !! - Potential duplicate medications found  Please discuss with provider  No discharge procedures on file      PDMP Review     None          ED Provider  Electronically Signed by           Allyssa Ortiz PA-C  05/07/22 2000

## 2022-05-07 NOTE — ASSESSMENT & PLAN NOTE
· Sodium 130 on presentation  · Likely hypovolemic hypotonic hyponatremia in the setting of volume depletion  · IV fluid hydration  · Trend BMP  · Avoid rapid over-correction

## 2022-05-07 NOTE — ASSESSMENT & PLAN NOTE
· COVID-19 positive on 27/42/7269  · Complicated by acute hypoxic respiratory failure requiring 2 L nasal cannula supplemental O2 on presentation  · Vaccinated x2 against COVID-19  · History of COPD not on home O2  · Remdesivir  · Decadron  · Incentive spirometry  · Respiratory protocol  · Ambulate patient

## 2022-05-07 NOTE — ASSESSMENT & PLAN NOTE
· Current smoker  · Not on home O2  · Likely acute decompensation in the setting of viral pneumonia  · IV corticosteroids as above  · Respiratory protocol  · Continue home inhalers

## 2022-05-18 ENCOUNTER — HOSPITAL ENCOUNTER (INPATIENT)
Facility: HOSPITAL | Age: 57
LOS: 2 days | Discharge: OTHER INSTITUTION | End: 2022-05-20
Attending: EMERGENCY MEDICINE | Admitting: EMERGENCY MEDICINE
Payer: COMMERCIAL

## 2022-05-18 DIAGNOSIS — F10.239 ALCOHOL WITHDRAWAL SYNDROME WITH COMPLICATION (HCC): ICD-10-CM

## 2022-05-18 DIAGNOSIS — F10.10 ALCOHOL ABUSE: Primary | ICD-10-CM

## 2022-05-18 DIAGNOSIS — F10.230 ALCOHOL DEPENDENCE WITH UNCOMPLICATED WITHDRAWAL (HCC): ICD-10-CM

## 2022-05-18 DIAGNOSIS — F10.239 ALCOHOL WITHDRAWAL (HCC): ICD-10-CM

## 2022-05-18 PROBLEM — S91.309A: Status: RESOLVED | Noted: 2022-05-18 | Resolved: 2022-05-18

## 2022-05-18 PROBLEM — S41.109A OPEN WOUNDS INVOLVING MULTIPLE REGIONS OF UPPER AND LOWER EXTREMITIES: Status: ACTIVE | Noted: 2019-10-31

## 2022-05-18 PROBLEM — S81.809A OPEN WOUNDS INVOLVING MULTIPLE REGIONS OF UPPER AND LOWER EXTREMITIES: Status: RESOLVED | Noted: 2019-10-31 | Resolved: 2022-05-18

## 2022-05-18 PROBLEM — S81.809A OPEN WOUNDS INVOLVING MULTIPLE REGIONS OF UPPER AND LOWER EXTREMITIES: Status: ACTIVE | Noted: 2019-10-31

## 2022-05-18 PROBLEM — Z59.00 HOMELESSNESS: Status: ACTIVE | Noted: 2022-05-18

## 2022-05-18 PROBLEM — R74.01 TRANSAMINITIS: Status: ACTIVE | Noted: 2022-05-18

## 2022-05-18 PROBLEM — S91.309A: Status: ACTIVE | Noted: 2022-05-18

## 2022-05-18 PROBLEM — S41.109A OPEN WOUNDS INVOLVING MULTIPLE REGIONS OF UPPER AND LOWER EXTREMITIES: Status: RESOLVED | Noted: 2019-10-31 | Resolved: 2022-05-18

## 2022-05-18 PROBLEM — F10.20 ALCOHOL USE DISORDER, SEVERE, DEPENDENCE (HCC): Status: ACTIVE | Noted: 2019-01-24

## 2022-05-18 LAB
ALBUMIN SERPL BCP-MCNC: 4.7 G/DL (ref 3–5.2)
ALP SERPL-CCNC: 104 U/L (ref 43–122)
ALT SERPL W P-5'-P-CCNC: 86 U/L
AMPHETAMINES SERPL QL SCN: NEGATIVE
ANION GAP SERPL CALCULATED.3IONS-SCNC: 12 MMOL/L (ref 5–14)
AST SERPL W P-5'-P-CCNC: 108 U/L (ref 17–59)
ATRIAL RATE: 121 BPM
BARBITURATES UR QL: NEGATIVE
BASOPHILS # BLD AUTO: 0.04 THOUSANDS/ΜL (ref 0–0.1)
BASOPHILS NFR BLD AUTO: 0 % (ref 0–1)
BENZODIAZ UR QL: NEGATIVE
BILIRUB SERPL-MCNC: 1.1 MG/DL
BUN SERPL-MCNC: 10 MG/DL (ref 5–25)
CALCIUM SERPL-MCNC: 8.7 MG/DL (ref 8.4–10.2)
CHLORIDE SERPL-SCNC: 102 MMOL/L (ref 97–108)
CO2 SERPL-SCNC: 23 MMOL/L (ref 22–30)
COCAINE UR QL: NEGATIVE
CREAT SERPL-MCNC: 0.72 MG/DL (ref 0.7–1.5)
EOSINOPHIL # BLD AUTO: 0.01 THOUSAND/ΜL (ref 0–0.61)
EOSINOPHIL NFR BLD AUTO: 0 % (ref 0–6)
ERYTHROCYTE [DISTWIDTH] IN BLOOD BY AUTOMATED COUNT: 15.4 % (ref 11.6–15.1)
ETHANOL EXG-MCNC: 0.15 MG/DL
GFR SERPL CREATININE-BSD FRML MDRD: 103 ML/MIN/1.73SQ M
GLUCOSE SERPL-MCNC: 102 MG/DL (ref 70–99)
HCT VFR BLD AUTO: 48.4 % (ref 36.5–49.3)
HGB BLD-MCNC: 16.7 G/DL (ref 12–17)
IMM GRANULOCYTES # BLD AUTO: 0.06 THOUSAND/UL (ref 0–0.2)
IMM GRANULOCYTES NFR BLD AUTO: 1 % (ref 0–2)
LYMPHOCYTES # BLD AUTO: 2.19 THOUSANDS/ΜL (ref 0.6–4.47)
LYMPHOCYTES NFR BLD AUTO: 19 % (ref 14–44)
MAGNESIUM SERPL-MCNC: 1.8 MG/DL (ref 1.6–2.3)
MCH RBC QN AUTO: 32.6 PG (ref 26.8–34.3)
MCHC RBC AUTO-ENTMCNC: 34.5 G/DL (ref 31.4–37.4)
MCV RBC AUTO: 95 FL (ref 82–98)
METHADONE UR QL: NEGATIVE
MONOCYTES # BLD AUTO: 0.95 THOUSAND/ΜL (ref 0.17–1.22)
MONOCYTES NFR BLD AUTO: 8 % (ref 4–12)
NEUTROPHILS # BLD AUTO: 8.04 THOUSANDS/ΜL (ref 1.85–7.62)
NEUTS SEG NFR BLD AUTO: 72 % (ref 43–75)
NRBC BLD AUTO-RTO: 0 /100 WBCS
OPIATES UR QL SCN: NEGATIVE
OXYCODONE+OXYMORPHONE UR QL SCN: NEGATIVE
P AXIS: 76 DEGREES
PCP UR QL: NEGATIVE
PLATELET # BLD AUTO: 260 THOUSANDS/UL (ref 149–390)
PMV BLD AUTO: 8.2 FL (ref 8.9–12.7)
POTASSIUM SERPL-SCNC: 4.5 MMOL/L (ref 3.6–5)
PR INTERVAL: 128 MS
PROT SERPL-MCNC: 8 G/DL (ref 5.9–8.4)
QRS AXIS: 83 DEGREES
QRSD INTERVAL: 86 MS
QT INTERVAL: 308 MS
QTC INTERVAL: 437 MS
RBC # BLD AUTO: 5.12 MILLION/UL (ref 3.88–5.62)
SODIUM SERPL-SCNC: 137 MMOL/L (ref 137–147)
T WAVE AXIS: 77 DEGREES
THC UR QL: NEGATIVE
VENTRICULAR RATE: 121 BPM
WBC # BLD AUTO: 11.29 THOUSAND/UL (ref 4.31–10.16)

## 2022-05-18 PROCEDURE — HZ2ZZZZ DETOXIFICATION SERVICES FOR SUBSTANCE ABUSE TREATMENT: ICD-10-PCS | Performed by: EMERGENCY MEDICINE

## 2022-05-18 PROCEDURE — 85025 COMPLETE CBC W/AUTO DIFF WBC: CPT | Performed by: PHYSICIAN ASSISTANT

## 2022-05-18 PROCEDURE — 93005 ELECTROCARDIOGRAM TRACING: CPT

## 2022-05-18 PROCEDURE — 80307 DRUG TEST PRSMV CHEM ANLYZR: CPT | Performed by: PHYSICIAN ASSISTANT

## 2022-05-18 PROCEDURE — 99291 CRITICAL CARE FIRST HOUR: CPT | Performed by: EMERGENCY MEDICINE

## 2022-05-18 PROCEDURE — 96375 TX/PRO/DX INJ NEW DRUG ADDON: CPT

## 2022-05-18 PROCEDURE — 93010 ELECTROCARDIOGRAM REPORT: CPT

## 2022-05-18 PROCEDURE — 83735 ASSAY OF MAGNESIUM: CPT | Performed by: PHYSICIAN ASSISTANT

## 2022-05-18 PROCEDURE — 99284 EMERGENCY DEPT VISIT MOD MDM: CPT

## 2022-05-18 PROCEDURE — 80053 COMPREHEN METABOLIC PANEL: CPT | Performed by: PHYSICIAN ASSISTANT

## 2022-05-18 PROCEDURE — 99285 EMERGENCY DEPT VISIT HI MDM: CPT | Performed by: PHYSICIAN ASSISTANT

## 2022-05-18 PROCEDURE — 36415 COLL VENOUS BLD VENIPUNCTURE: CPT | Performed by: PHYSICIAN ASSISTANT

## 2022-05-18 PROCEDURE — 96374 THER/PROPH/DIAG INJ IV PUSH: CPT

## 2022-05-18 PROCEDURE — 82075 ASSAY OF BREATH ETHANOL: CPT | Performed by: PHYSICIAN ASSISTANT

## 2022-05-18 RX ORDER — LORAZEPAM 2 MG/ML
1 INJECTION INTRAMUSCULAR ONCE
Status: COMPLETED | OUTPATIENT
Start: 2022-05-18 | End: 2022-05-18

## 2022-05-18 RX ORDER — IBUPROFEN 600 MG/1
600 TABLET ORAL ONCE
Status: COMPLETED | OUTPATIENT
Start: 2022-05-18 | End: 2022-05-18

## 2022-05-18 RX ORDER — FLUTICASONE PROPIONATE 44 MCG
2 AEROSOL WITH ADAPTER (GRAM) INHALATION 2 TIMES DAILY
COMMUNITY
Start: 2022-04-26 | End: 2022-05-20

## 2022-05-18 RX ORDER — FOLIC ACID 1 MG/1
1 TABLET ORAL DAILY
Status: DISCONTINUED | OUTPATIENT
Start: 2022-05-18 | End: 2022-05-20 | Stop reason: HOSPADM

## 2022-05-18 RX ORDER — ONDANSETRON 2 MG/ML
4 INJECTION INTRAMUSCULAR; INTRAVENOUS ONCE
Status: COMPLETED | OUTPATIENT
Start: 2022-05-18 | End: 2022-05-18

## 2022-05-18 RX ORDER — DIAZEPAM 5 MG/ML
10 INJECTION, SOLUTION INTRAMUSCULAR; INTRAVENOUS ONCE
Status: COMPLETED | OUTPATIENT
Start: 2022-05-18 | End: 2022-05-18

## 2022-05-18 RX ORDER — ACETAMINOPHEN 325 MG/1
650 TABLET ORAL EVERY 6 HOURS PRN
Status: DISCONTINUED | OUTPATIENT
Start: 2022-05-18 | End: 2022-05-20 | Stop reason: HOSPADM

## 2022-05-18 RX ORDER — LANOLIN ALCOHOL/MO/W.PET/CERES
100 CREAM (GRAM) TOPICAL DAILY
Status: DISCONTINUED | OUTPATIENT
Start: 2022-05-18 | End: 2022-05-20 | Stop reason: HOSPADM

## 2022-05-18 RX ORDER — ENOXAPARIN SODIUM 100 MG/ML
40 INJECTION SUBCUTANEOUS DAILY
Status: DISCONTINUED | OUTPATIENT
Start: 2022-05-18 | End: 2022-05-20 | Stop reason: HOSPADM

## 2022-05-18 RX ORDER — BACITRACIN, NEOMYCIN, POLYMYXIN B 400; 3.5; 5 [USP'U]/G; MG/G; [USP'U]/G
2 OINTMENT TOPICAL 2 TIMES DAILY
Status: DISCONTINUED | OUTPATIENT
Start: 2022-05-18 | End: 2022-05-20 | Stop reason: HOSPADM

## 2022-05-18 RX ORDER — PHENOBARBITAL SODIUM 130 MG/ML
130 INJECTION INTRAMUSCULAR ONCE
Status: COMPLETED | OUTPATIENT
Start: 2022-05-18 | End: 2022-05-18

## 2022-05-18 RX ORDER — SODIUM CHLORIDE 9 MG/ML
125 INJECTION, SOLUTION INTRAVENOUS CONTINUOUS
Status: DISCONTINUED | OUTPATIENT
Start: 2022-05-18 | End: 2022-05-19

## 2022-05-18 RX ORDER — PHENOBARBITAL 64.8 MG/1
64.8 TABLET ORAL ONCE
Status: COMPLETED | OUTPATIENT
Start: 2022-05-18 | End: 2022-05-18

## 2022-05-18 RX ORDER — NICOTINE 21 MG/24HR
1 PATCH, TRANSDERMAL 24 HOURS TRANSDERMAL DAILY
Status: DISCONTINUED | OUTPATIENT
Start: 2022-05-18 | End: 2022-05-20 | Stop reason: HOSPADM

## 2022-05-18 RX ADMIN — SODIUM CHLORIDE 125 ML/HR: 0.9 INJECTION, SOLUTION INTRAVENOUS at 11:07

## 2022-05-18 RX ADMIN — MULTIPLE VITAMINS W/ MINERALS TAB 1 TABLET: TAB ORAL at 11:07

## 2022-05-18 RX ADMIN — THIAMINE HCL TAB 100 MG 100 MG: 100 TAB at 11:07

## 2022-05-18 RX ADMIN — PHENOBARBITAL 64.8 MG: 64.8 TABLET ORAL at 16:14

## 2022-05-18 RX ADMIN — DIAZEPAM 10 MG: 10 INJECTION, SOLUTION INTRAMUSCULAR; INTRAVENOUS at 12:28

## 2022-05-18 RX ADMIN — Medication 650 MG: at 15:19

## 2022-05-18 RX ADMIN — SODIUM CHLORIDE 125 ML/HR: 0.9 INJECTION, SOLUTION INTRAVENOUS at 19:29

## 2022-05-18 RX ADMIN — ENOXAPARIN SODIUM 40 MG: 100 INJECTION SUBCUTANEOUS at 11:07

## 2022-05-18 RX ADMIN — IBUPROFEN 600 MG: 600 TABLET ORAL at 06:38

## 2022-05-18 RX ADMIN — FOLIC ACID 1 MG: 1 TABLET ORAL at 11:07

## 2022-05-18 RX ADMIN — BACITRACIN ZINC, NEOMYCIN, POLYMYXIN B 2 SMALL APPLICATION: 400; 3.5; 5 OINTMENT TOPICAL at 12:28

## 2022-05-18 RX ADMIN — NICOTINE 1 PATCH: 21 PATCH, EXTENDED RELEASE TRANSDERMAL at 11:07

## 2022-05-18 RX ADMIN — LORAZEPAM 1 MG: 2 INJECTION INTRAMUSCULAR; INTRAVENOUS at 06:38

## 2022-05-18 RX ADMIN — PHENOBARBITAL SODIUM 130 MG: 130 INJECTION INTRAMUSCULAR; INTRAVENOUS at 19:58

## 2022-05-18 RX ADMIN — ONDANSETRON 4 MG: 2 INJECTION INTRAMUSCULAR; INTRAVENOUS at 06:38

## 2022-05-18 NOTE — PLAN OF CARE
Problem: INFECTION - ADULT  Goal: Absence or prevention of progression during hospitalization  Description: INTERVENTIONS:  - Assess and monitor for signs and symptoms of infection  - Monitor lab/diagnostic results  - Monitor all insertion sites, i e  indwelling lines, tubes, and drains  - Monitor endotracheal if appropriate and nasal secretions for changes in amount and color  - [place] appropriate cooling/warming therapies per order  - Administer medications as ordered  - Instruct and encourage patient and family to use good hand hygiene technique  - Identify and instruct in appropriate isolation precautions for identified infection/condition  Outcome: Progressing     Problem: Knowledge Deficit  Goal: Patient/family/caregiver demonstrates understanding of disease process, treatment plan, medications, and discharge instructions  Description: Complete learning assessment and assess knowledge base    Interventions:  - Provide teaching at level of understanding  - Provide teaching via preferred learning methods  Outcome: Progressing

## 2022-05-18 NOTE — ED PROVIDER NOTES
History  Chief Complaint   Patient presents with    Detox Evaluation     Wants detox for alcohol  Drinks a 12 pack of beer a day and a couple of swishers of whiskey  having nausea and anxiety at the moment      79-year-old male with history of alcohol abuse presents requesting detox from alcohol  Patient tells me that for the past 10 days he has been on an alcohol binge and has been drinking a 12 pack of beer every day  Tells me that before that he was still drinking every day but it has been worse for the past 10 days  Denies any new life stressor event however tells me he was recently kicked out of the [facility] [place] and has nowhere to live  Denies SI or HI  Denies drug abuse  Last drink was 6 hours ago  Patient tells me that he currently feels mildly anxious and nauseous, has been through withdrawal several times in the past  Denies any other complaints       History provided by:  Patient   used: No        Prior to Admission Medications   Prescriptions Last Dose Informant Patient Reported? Taking? albuterol (PROVENTIL HFA,VENTOLIN HFA) 90 mcg/act inhaler Not Taking at Unknown time  No No   Sig: Inhale 2 puffs every 4 (four) hours as needed for wheezing   Patient not taking: Reported on 5/18/2022   albuterol (ProAir HFA) 90 mcg/act inhaler Not Taking at Unknown time  No No   Sig: Inhale 2 puffs every 6 (six) hours as needed for wheezing   Patient not taking: Reported on 5/18/2022   fluticasone (Flovent HFA) 44 mcg/act inhaler Not Taking at Unknown time  Yes No   Sig: Inhale 2 puffs 2 (two) times a day   Patient not taking: Reported on 5/18/2022      Facility-Administered Medications: None       Past Medical History:   Diagnosis Date    COPD (chronic obstructive pulmonary disease) ([facility] [address] )        History reviewed  No pertinent surgical history  History reviewed  No pertinent family history  I have reviewed and agree with the history as documented      E-Cigarette/Vaping    E-Cigarette Use Never User      E-Cigarette/Vaping Substances    Nicotine No     THC No     CBD No     Flavoring No     Other No     Unknown No      Social History     Tobacco Use    Smoking status: Current Every Day Smoker     Packs/day: 1 00     Years: 40 00     Pack years: 40 00     Types: Cigarettes    Smokeless tobacco: Never Used   Vaping Use    Vaping Use: Never used   Substance Use Topics    Alcohol use: Yes     Alcohol/week: 112 0 standard drinks     Types: 84 Cans of beer, 28 Shots of liquor per week    Drug use: No       Review of Systems   Constitutional: Negative  Negative for chills and fatigue  HENT: Negative for ear pain and sore throat  Eyes: Negative for photophobia and redness  Respiratory: Negative for apnea, cough and shortness of breath  Cardiovascular: Negative for chest pain  Gastrointestinal: Positive for nausea  Negative for abdominal pain and vomiting  Genitourinary: Negative for dysuria  Musculoskeletal: Negative for arthralgias, neck pain and neck stiffness  Skin: Negative for rash  Neurological: Negative for dizziness, tremors, syncope and weakness  Psychiatric/Behavioral: Negative for suicidal ideas  The patient is nervous/anxious  Physical Exam  Physical Exam  Constitutional:       General: He is not in acute distress  Appearance: He is well-developed  He is not ill-appearing, toxic-appearing or diaphoretic  Comments: Disheveled, anxious   Eyes:      Pupils: Pupils are equal, round, and reactive to light  Cardiovascular:      Rate and Rhythm: Normal rate and regular rhythm  Pulmonary:      Effort: Pulmonary effort is normal  No respiratory distress  Breath sounds: Normal breath sounds  Abdominal:      General: Bowel sounds are normal  There is no distension  Palpations: Abdomen is soft  Musculoskeletal:         General: Normal range of motion  Cervical back: Normal range of motion and neck supple     Skin: General: Skin is warm and dry  Comments: 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 with minor bleeding   Neurological:      Mental Status: He is alert and oriented to person, place, and time           Vital Signs  ED Triage Vitals   Temperature Pulse Respirations Blood Pressure SpO2   05/18/22 0558 05/18/22 0558 05/18/22 0558 05/18/22 0558 05/18/22 0558   (!) 96 5 °F (35 8 °C) (!) 127 20 159/77 96 %      Temp Source Heart Rate Source Patient Position - Orthostatic VS BP Location FiO2 (%)   05/18/22 0558 05/18/22 0558 05/18/22 0558 05/18/22 0558 --   Tympanic Monitor Sitting Left arm       Pain Score       05/18/22 0848       No Pain           Vitals:    05/18/22 1032 05/18/22 1500 05/18/22 1600 05/18/22 1948   BP: 139/76 (!) 142/111 142/82 136/97   Pulse: 100 (!) 123 (!) 112 (!) 110   Patient Position - Orthostatic VS:   Lying Sitting         Visual Acuity      ED Medications  Medications   sodium chloride 0 9 % infusion (125 mL/hr Intravenous New Bag 5/18/22 1929)   enoxaparin (LOVENOX) subcutaneous injection 40 mg (40 mg Subcutaneous Given 5/18/22 1107)   nicotine (NICODERM CQ) 21 mg/24 hr TD 24 hr patch 1 patch (1 patch Transdermal Medication Applied 5/18/22 1107)   thiamine tablet 100 mg (100 mg Oral Given 6/59/80 4036)   folic acid (FOLVITE) tablet 1 mg (1 mg Oral Given 5/18/22 1107)   multivitamin-minerals (CENTRUM) tablet 1 tablet (1 tablet Oral Given 5/18/22 1107)   neomycin-bacitracin-polymyxin b (NEOSPORIN) ointment 2 small application (2 small application Topical Refused 5/18/22 1746)   acetaminophen (TYLENOL) tablet 650 mg (has no administration in time range)   ibuprofen (MOTRIN) tablet 600 mg (600 mg Oral Given 5/18/22 0638)   LORazepam (ATIVAN) injection 1 mg (1 mg Intravenous Given 5/18/22 0638)   ondansetron (ZOFRAN) injection 4 mg (4 mg Intravenous Given 5/18/22 0638)   diazepam (VALIUM) injection 10 mg (10 mg Intravenous Given 5/18/22 5804)   phenobarbital in sodium chloride 0 9% 650 mg (650 mg Intravenous Given 5/18/22 1519)   PHENobarbital tablet 64 8 mg (64 8 mg Oral Given 5/18/22 1614)   PHENobarbital 130 mg/mL injection 130 mg (130 mg Intravenous Given 5/18/22 1958)       Diagnostic Studies  Results Reviewed     Procedure Component Value Units Date/Time    Rapid drug screen, urine [[id number]]  (Normal) Collected: 05/18/22 0631    Lab Status: Final result Specimen: Urine, Other Updated: 05/18/22 0735     Amph/Meth UR Negative     Barbiturate Ur Negative     Benzodiazepine Urine Negative     Cocaine Urine Negative     Methadone Urine Negative     Opiate Urine Negative     PCP Ur Negative     THC Urine Negative     Oxycodone Urine Negative    Narrative:      FOR MEDICAL PURPOSES ONLY  IF CONFIRMATION NEEDED PLEASE CONTACT THE LAB WITHIN 5 DAYS      Drug Screen Cutoff Levels:  AMPHETAMINE/METHAMPHETAMINES  1000 ng/mL  BARBITURATES     200 ng/mL  BENZODIAZEPINES     200 ng/mL  COCAINE      300 ng/mL  METHADONE      300 ng/mL  OPIATES      300 ng/mL  PHENCYCLIDINE     25 ng/mL  THC       50 ng/mL  OXYCODONE      100 ng/mL    Magnesium [[id number]]  (Normal) Collected: 05/18/22 0615    Lab Status: Final result Specimen: Blood from Arm, Left Updated: 05/18/22 0636     Magnesium 1 8 mg/dL     Comprehensive metabolic panel [[id number]]  (Abnormal) Collected: 05/18/22 0615    Lab Status: Final result Specimen: Blood from Arm, Left Updated: 05/18/22 0636     Sodium 137 mmol/L      Potassium 4 5 mmol/L      Chloride 102 mmol/L      CO2 23 mmol/L      ANION GAP 12 mmol/L      BUN 10 mg/dL      Creatinine 0 72 mg/dL      Glucose 102 mg/dL      Calcium 8 7 mg/dL       U/L      ALT 86 U/L      Alkaline Phosphatase 104 U/L      Total Protein 8 0 g/dL      Albumin 4 7 g/dL      Total Bilirubin 1 10 mg/dL      eGFR 103 ml/min/1 73sq m     Narrative:      [place] guidelines for Chronic Kidney Disease (CKD):     Stage 1 with normal or high GFR (GFR > 90 mL/min/1 73 square meters)    Stage 2 Mild CKD (GFR = 60-89 mL/min/1 73 square meters)    Stage 3A Moderate CKD (GFR = 45-59 mL/min/1 73 square meters)    Stage 3B Moderate CKD (GFR = 30-44 mL/min/1 73 square meters)    Stage 4 Severe CKD (GFR = 15-29 mL/min/1 73 square meters)    Stage 5 End Stage CKD (GFR <15 mL/min/1 73 square meters)  Note: GFR calculation is accurate only with a steady state creatinine    CBC and differential [[id number]]  (Abnormal) Collected: 05/18/22 0615    Lab Status: Final result Specimen: Blood from Arm, Left Updated: 05/18/22 0621     WBC 11 29 Thousand/uL      RBC 5 12 Million/uL      Hemoglobin 16 7 g/dL      Hematocrit 48 4 %      MCV 95 fL      MCH 32 6 pg      MCHC 34 5 g/dL      RDW 15 4 %      MPV 8 2 fL      Platelets 271 Thousands/uL      nRBC 0 /100 WBCs      Neutrophils Relative 72 %      Immat GRANS % 1 %      Lymphocytes Relative 19 %      Monocytes Relative 8 %      Eosinophils Relative 0 %      Basophils Relative 0 %      Neutrophils Absolute 8 04 Thousands/µL      Immature Grans Absolute 0 06 Thousand/uL      Lymphocytes Absolute 2 19 Thousands/µL      Monocytes Absolute 0 95 Thousand/µL      Eosinophils Absolute 0 01 Thousand/µL      Basophils Absolute 0 04 Thousands/µL     POCT alcohol breath test [[id number]]  (Normal) Resulted: 05/18/22 0616    Lab Status: Final result Updated: 05/18/22 0616     EXTBreath Alcohol 0 150                 No orders to display              Procedures  ECG 12 Lead Documentation Only    Date/Time: 5/18/2022 6:23 AM  Performed by: Darcy Burgos PA-C  Authorized by: Darcy Burgos PA-C     Indications / Diagnosis:  Anxious   ECG reviewed by me, the ED Provider: yes    Patient location:  ED  Interpretation:     Interpretation: normal    Rate:     ECG rate:  121    ECG rate assessment: tachycardic    Rhythm:     Rhythm: sinus tachycardia    Ectopy:     Ectopy: none    QRS:     QRS axis:  Normal    QRS intervals:  Normal  Conduction:     Conduction: normal    ST segments:     ST segments:  Normal             ED Course  ED Course as of 05/18/22 2026   Wed May 18, 2022   8217 Pt medically cleared for detox placement    0657 Care signed out to attending at change of shift pending dispo                                SBIRT 20yo+    Flowsheet Row Most Recent Value   SBIRT (23 yo +)    In order to provide better care to our patients, we are screening all of our patients for alcohol and drug use  Would it be okay to ask you these screening questions? Yes Filed at: 05/18/2022 0701   Initial Alcohol Screen: US AUDIT-C     1  How often do you have a drink containing alcohol? 6 Filed at: 05/18/2022 0701   2  How many drinks containing alcohol do you have on a typical day you are drinking? 6 Filed at: 05/18/2022 0701   3a  Male UNDER 65: How often do you have five or more drinks on one occasion? 6 Filed at: 05/18/2022 0701   3b  FEMALE Any Age, or MALE 65+: How often do you have 4 or more drinks on one occassion? 0 Filed at: 05/18/2022 0701   Audit-C Score 18 Filed at: 05/18/2022 0701   Full Alcohol Screen: US AUDIT    4  How often during the last year have you found that you were not able to stop drinking once you had started? 3 Filed at: 05/18/2022 0701   5  How often during past year have you failed to do what was normally expected of you because of drinking? 3 Filed at: 05/18/2022 0701   6  How often in past year have you needed a first drink in the morning to get yourself going after a heavy drinking session? 3 Filed at: 05/18/2022 0701   7  How often in past year have you had feeling of guilt or remorse after drinking? 3 Filed at: 05/18/2022 0701   8  How often in past year have you been unable to remember what happened night before because you had been drinking? 3 Filed at: 05/18/2022 0701   9  Have you or someone else been injured as a result of your drinking? 0 Filed at: 05/18/2022 0701   10   Has a relative, friend, doctor or other health worker been concerned about your drinking and suggested you cut down? 4 Filed at: 05/18/2022 0701   AUDIT Total Score 37 Filed at: 05/18/2022 0701   SINDHU: How many times in the past year have you    Used an illegal drug or used a prescription medication for non-medical reasons? Never Filed at: 05/18/2022 0701                    MDM  Number of Diagnoses or Management Options  Alcohol abuse: new and requires workup  Alcohol withdrawal [facility]): new and requires workup  Diagnosis management comments: Patient was seen in the emergency department with some symptoms of withdrawal started on benzodiazepines and was medically cleared for placement to detox  Stable for admission to the floor         Amount and/or Complexity of Data Reviewed  Clinical lab tests: ordered    Risk of Complications, Morbidity, and/or Mortality  Presenting problems: moderate  Diagnostic procedures: moderate  Management options: moderate    Patient Progress  Patient progress: stable      Disposition  Final diagnoses:   Alcohol abuse   Alcohol withdrawal ([facility] )     Time reflects when diagnosis was documented in both MDM as applicable and the Disposition within this note     Time User Action Codes Description Comment    5/18/2022  9:25 AM Carlene MAURER Add [F10 10] Alcohol abuse     5/18/2022  9:25 AM Dinora Abbott Add [F10 239] Alcohol withdrawal ([facility] )     5/18/2022 10:31 AM Luz Adams Add [F10 230] Alcohol dependence with uncomplicated withdrawal [facility])       ED Disposition     ED Disposition   Admit    Condition   Stable    Date/Time   Wed May 18, 2022  9:25 AM    Comment              Follow-up Information    None         Current Discharge Medication List      CONTINUE these medications which have NOT CHANGED    Details   !! albuterol (ProAir HFA) 90 mcg/act inhaler Inhale 2 puffs every 6 (six) hours as needed for wheezing  Qty: 8 5 g, Refills: 0    Comments: Substitution to a formulary equivalent within the same pharmaceutical class is authorized  Associated Diagnoses: COPD exacerbation ([address] ); COVID-19      !! albuterol (PROVENTIL HFA,VENTOLIN HFA) 90 mcg/act inhaler Inhale 2 puffs every 4 (four) hours as needed for wheezing  Qty: 1 Inhaler, Refills: 0    Comments: Substitution to a formulary equivalent within the same pharmaceutical class is authorized  Associated Diagnoses: COPD exacerbation ([facility])      fluticasone (Flovent HFA) 44 mcg/act inhaler Inhale 2 puffs 2 (two) times a day       !! - Potential duplicate medications found  Please discuss with provider  No discharge procedures on file      PDMP Review     None          ED Provider  Electronically Signed by           Chayito Nichole PA-C  05/18/22 2026

## 2022-05-18 NOTE — ASSESSMENT & PLAN NOTE
· Patient has superficial wounds of the little toes bilaterally due to walking long distances without socks in shoes that are too small for him  · Wounds have evidence of prior bleeding but are not actively bleeding currently  · No evidence of infection  · Pain is mild  · Patient was monitored for signs of infection     · Continue Neosporin and dressing to the small toes bilaterally

## 2022-05-18 NOTE — ASSESSMENT & PLAN NOTE
· Discontinue SEWS protocol as patient has received four consecutive 0's  · Denies any current withdrawal symptoms besides continued anxiety, will treat symptomatically     · VS stable HR 73, BP: 133/ 88  · Patient received a total of 1560 mg of phenobarbital without complication

## 2022-05-18 NOTE — ED CARE HANDOFF
Emergency Department Sign Out Note              ED Course as of 05/18/22 0926   Wed May 18, 2022   0820 Spoke with detox team, awaiting bed availability at this time, patient calm and stable      Procedures  MDM        Disposition  Final diagnoses:   Alcohol abuse   Alcohol withdrawal ([facility] [address] )     Time reflects when diagnosis was documented in both MDM as applicable and the Disposition within this note     Time User Action Codes Description Comment    5/18/2022  9:25 AM Franc Gamma R Add [F10 10] Alcohol abuse     5/18/2022  9:25 AM Viktoriya Bang Add [F10 239] Alcohol withdrawal [facility])       ED Disposition     ED Disposition   Admit    Condition   Stable    Date/Time   Wed May 18, 2022  9:25 AM    Comment              Follow-up Information    None       Patient's Medications   Discharge Prescriptions    No medications on file     No discharge procedures on file         ED Provider  Electronically Signed by     Clifford Galloway DO  05/18/22 5073

## 2022-05-18 NOTE — ED NOTES
Patient is resting comfortably on hallway stretcher, respirations non-labored, no distress noted        Geeta Singh RN  05/18/22 1337

## 2022-05-18 NOTE — H&P
HISTORY & PHYSICAL EXAM  DEPARTMENT OF MEDICAL TOXICOLOGY  LEVEL 4 MEDICAL DETOX UNIT  Khai Nice 61 y o  male MRN: [id number]  Unit/Bed#: [address] [place] [address] [id number] Encounter: [id number]      Reason for Admission/Principal Problem: Ethanol withdrawal, Ethanol use disorder  Admitting Provider: Constance De Los Santos DO  Attending Provider: Raymond Gauthier MD   5/18/2022  5:58 AM        Alcohol withdrawal syndrome with complication [facility])  Assessment & Plan  · H/o chronic daily alcohol consumption, denies h/o withdrawal seizures  · Last drink: 5/18/2022 0700  · EXT Breath Alcohol: 0 150  · Follow SEWS protocol with symptom-triggered phenobarbital for medically-assisted alcohol withdrawal  · Current symptoms include anxiety, tremor and agitation/impulsiveness   · SEWS score upon admission: 8  · Withdrawal appears moderate currently- plan to administer 260mg phenobarbital + valium 10 mg IV   · Will initiate thiamine, folic acid, and multivitamin supplementation   · Continue to monitor vitals, symptoms         Alcohol use disorder, severe, dependence ([facility] [address] )  Assessment & Plan  · Admits h/o 47 years of alcohol use  with intermittent periods of sobriety  · Admits drinks 12 pack of beer +4 shots of whiskey daily for the last 10 days, with last drink today 05/18/2022 at 0700  · Denies h/o DT or withdrawal seizures   · Withdrawal symptoms:  Anxiety, tremor, mild agitation  · ED treatments:  1 mg Ativan  · SEWs protocol for alcohol withdrawal initiated using phenobarbital  · Score 8 (5/18/2022) scheduled 260mg phenobarbital + 10 mg Valium IV  · Supportive treatment with NS, MVI, thiamine, and folate  · Will continue to monitor for improvement       Nicotine dependence  Assessment & Plan  · Patient reports smoking 1 PPD or more of cigarettes  · NRT offered  · Encouraged cessation    Transaminitis  Assessment & Plan  · LFTs on admission 05/18/2022:  , ALT 86  · Likely due to alcohol use  · No abdominal tenderness currently  · Continue to monitor by a CMP tomorrow 05/19/2022    Homelessness  Assessment & Plan  · Patient reports that after prior inpatient rehabilitation stay at [facility] in November of 2021 he went directly to a shelter and was there until 10 days ago when he started drinking again  · Patient requests inpatient treatment at a rehabilitation center after discharge  · Inpatient consult to case management    Open wound of foot, initial encounter  Assessment & Plan  · Patient has superficial wounds of the little toes bilaterally due to walking long distances without socks in shoes that are too small for him  · Wounds have evidence of prior bleeding but are not actively bleeding currently  · No evidence of infection  · Pain is mild  · Apply Neosporin and dressing to the small toes bilaterally  · Continue to monitor for infection or worsening pain    COPD (chronic obstructive pulmonary disease) ([facility] [address] )  Assessment & Plan  · Patient denies shortness of breath currently and wheezing is heard bilaterally  · No evidence of acute COPD exacerbation at this time  · O2 saturation 100% on room air currently  · Takes no home medications for COPD  · Continue to monitor for exacerbation symptoms             VTE Prophylaxis: Enoxaparin (Lovenox)  / sequential compression device   Code Status:  Level 1-full code      Anticipated Length of Stay:  Patient will be admitted on an Inpatient basis with an anticipated length of stay of  2  midnights  Justification for Hospital Stay:  Alcohol withdrawal syndrome requiring monitoring and medication administration    For any questions or concerns, please Tiger Text the advanced practitioner in the role of [facility]-DETOX-AP On Call      This patient qualifies for Level IV medically managed intensive inpatient services under the criteria set by the American Society of Addiction Medicine, including dimensions 1-3   The patient is in withdrawal (or is intoxicated with high risk of withdrawal), with severe and unstable medical and/or psychiatric (dual diagnosis) problems, requiring requires 24-hour medical and nursing care and the full resources of a [address] patient to medical detox unit and continue supportive care and stabilization of acute ethanol withdrawal per medical toxicology/detox treatment pathway  Monitor ethanol withdrawal severity via the Severity of Ethanol Withdrawal Scale (SEWS) Q4 hours and then hourly if/when SEWS > 6  Treat withdrawal per pathway and reassess Q30-60 minutes  Mild SEWS Score 1-6  Administer medications* (IV or PO; PO preferred):   If initial SEWS score: diazepam 10mg PO/IV x 1 AND phenobarbital 65 mg PO/IV x 1   If repeat SEWS score 1-6: phenobarbital 65 mg PO/IV q1 hour x 5 doses maximum   Reassessment:    SEWS q1 hour after each dose until SEWS 0 x 2 hours   VS q1 hours (until SEWS 0, then q4 hours)   Notify provider for bedside evaluation if 5-dose maximum is reached, RASS of -3 to -5, or SEWS score escalates to moderate or severe  Moderate SEWS Score 7-12  Administer medications* (IV):   If initial SEWS score: diazepam 10mg IV x 1 AND phenobarbital 260 mg IV x 1   If repeat SEWS score 7-12 or score escalated from mild: phenobarbital 130 mg IV q30 minutes x 5 doses maximum   Reassessment:   SEWS q30 minutes after each dose until SEWS < 7 (then hourly until SEWS 0 x 2 hours)   VS q30 minutes until SEWS < 7 (then hourly until SEWS 0, then q4 hours)   Notify provider for bedside evaluation if 5-dose maximum is reached, RASS of -3 to -5, or SEWS score escalates to severe  Severe SEWS Score ? 13  Administer medications* (IV):   If initial SEWS score: Diazepam 10 mg IV x 1 AND phenobarbital 650 mg IV piggyback x 1 over 15-30 minutes   If repeat SEWS score ?  13 or score escalated from mild or moderate: phenobarbital 130 mg IV q30 minutes x 5 doses maximum   Reassessment:   SEWS q30 minutes after each dose until SEWS < 7 (then hourly until SEWS 0 x 2 hours)    VS q30 minutes until SEWS < 7 (then hourly until SEWS 0, then q4 hours)   Notify provider for bedside evaluation if 5-dose maximum is reached or RASS of -3 to -5   *Hold medications and notify provider if CNS depression, respirations < 10/min, or RASS of -3 to -5  Medications to be administered adjunctively if more than 2 grams of phenobarbital is needed for stabilization of withdrawal; require attending approval     Dexmedetomidine infusion 0 1-1mcg/kg/hr IV infusion, titratable to reduced agitation (Goal: RASS -2)   Ketamine   o Acute agitated delirium: 1-2 mg/kg IV or 4-5 mg/kg IM  o Refractory withdrawal: 0 1-1mg/kg/hr IV infusion, titratable to reduced agitation (Goal: RASS -2)    Further evaluation, screening and treatment:  Evaluate complete metabolic panel, transaminases, INR, and lipase  Assess hepatic ultrasound for any sign of alcoholic liver disease or cirrhosis, and ultimately refer for further hepatic evaluation and care as/if indicated  Additional medications for ethanol associated malnutrition: Thiamine 100 mg IV daily, increase to 500 mg TID for signs/symptoms of Wernicke's Encephalopathy or Wernicke Korsakoff Syndrome   Folic acid 1 mg IV daily   Multivitamin PO daily      Will offer first monthly injection of Naltrexone 380 mg IM, once patient is stabilized, as it has been shown to assist in decreasing cravings for ethanol  Evaluate and treat for coexisting substance use, such as opioids and nicotine  Discuss risk factors for infectious disease, such as history of intravenous drug abuse, and offer hepatitis and HIV screening if indicated  Case management consultation to assist with coordination of subsequent treatment after discharge            Hx and PE limited by: none    HPI: Madonna López is a 61y o  year old male with PMH of COPD and experiencing homelessness who presents with 10 days of heavy drinking including 12 pack of beer daily and 4 shots of whiskey experiencing withdrawal symptoms including anxiety, tremor, agitation  The patient also reports that he was sweaty with chills yesterday  He reports that his last drink was just prior to arrival in the emergency department this morning 05/18/2022 at 0700  He reports previous inpatient treatment for alcohol withdrawal and alcohol use disorder in November of last year and reports that he went directly to a shelter after that inpatient stay  He says that he was sober until 10 days ago and was kicked out of his shelter because he was drinking  He room reports that there was no inciting incident that caused him to drink 10 days ago that he just felt like drinking  He reports that last night he was drinking throughout the night and that he has had auditory hallucinations in the past but denies any currently  He has never received Vivitrol treatment outpatient  Other than withdrawal symptoms the patient has mild pain in his little toes bilaterally due to superficial wounds due to walking long distances an ill-fitting shoes without socks  He otherwise feels in his normal state of health  Preferred alcoholic beverage(s):  Beer and whiskey  Quantity and frequency of alcohol intake:  12 pack of beer +4 shots of whiskey daily  Use of any ethanol substitutes (toxic alcohols): no  Date/Time of last alcohol intake:  05/18/2022 0700  Current signs and symptoms of ethanol withdrawal: anxiety, tremor, agitation and tachycardia    No data recorded        Ethanol Withdrawal History  Previous ethanol withdrawal? yes  Prior inpatient treatment for ethanol withdrawal? yes  Prior outpatient treatment for ethanol withdrawal? no  History of seizures with prior ethanol withdrawal? no  Prior treatment with naltrexone (Vivitrol)? no  Current treatment with naltrexone (Vivitrol)? no  Other current treatment for ethanol use disorder? no  Co-existing substance use? no    Review of PDMP: yes    Social History     Substance and Sexual Activity   Alcohol Use Yes    Alcohol/week: 112 0 standard drinks    Types: 84 Cans of beer, 28 Shots of liquor per week     Social History     Substance and Sexual Activity   Drug Use No     Social History     Tobacco Use   Smoking Status Current Every Day Smoker    Packs/day: 1 00    Years: 40 00    Pack years: 40 00    Types: Cigarettes   Smokeless Tobacco Never Used       Review of Systems   Constitutional: Positive for chills  Negative for fever  HENT: Negative for congestion, ear pain and sore throat  Eyes: Negative for pain and visual disturbance  Respiratory: Negative for cough and shortness of breath  Cardiovascular: Negative for chest pain and palpitations  Gastrointestinal: Negative for abdominal pain, constipation, diarrhea, nausea and vomiting  Genitourinary: Negative for dysuria and hematuria  Musculoskeletal: Negative for arthralgias and back pain  Skin: Positive for wound (Little toes bilaterally)  Negative for color change and rash  Neurological: Positive for tremors  Negative for seizures and syncope  Psychiatric/Behavioral: Positive for agitation  The patient is nervous/anxious  All other systems reviewed and are negative  Historical Information   Past Medical History:   Diagnosis Date    COPD (chronic obstructive pulmonary disease) ([facility] [address] )      History reviewed  No pertinent surgical history  History reviewed  No pertinent family history  Social History   Marital Status: Legally    Occupation:  Unemployed  Patient Pre-hospital Living Situation:  Homeless  Patient Pre-hospital Level of Mobility:  Independently mobile  Patient Pre-hospital Diet Restrictions:  None    No Known Allergies    Prior to Admission medications    Medication Sig Start Date End Date Taking?  Authorizing Provider   acetaminophen (TYLENOL) 325 mg tablet Take 650 mg by mouth every 6 (six) hours as needed for mild pain or headaches  5/18/22 Yes Historical Provider, MD   albuterol (ProAir HFA) 90 mcg/act inhaler Inhale 2 puffs every 6 (six) hours as needed for wheezing  Patient not taking: Reported on 5/18/2022 5/7/22   Delfina Gentile PA-C   albuterol (PROVENTIL HFA,VENTOLIN HFA) 90 mcg/act inhaler Inhale 2 puffs every 4 (four) hours as needed for wheezing  Patient not taking: Reported on 5/18/2022 1/31/20   Mulu Mehta PA-C   fluticasone (Flovent HFA) 44 mcg/act inhaler Inhale 2 puffs 2 (two) times a day  Patient not taking: Reported on 5/18/2022 4/26/22   Historical Provider, MD   benzonatate (TESSALON PERLES) 100 mg capsule Take 1 capsule (100 mg total) by mouth every 8 (eight) hours  Patient not taking: No sig reported 1/31/20 5/18/22  Mulu Mehta PA-C   nicotine (NICODERM CQ) 21 mg/24 hr TD 24 hr patch Place 1 patch on the skin daily  Patient not taking: No sig reported 11/7/19 5/18/22  Geoff Byrne MD   predniSONE 10 mg tablet 5 tabs po qd x 2 days then 4 tabs po qd x 2 days then 3 tabs po qd x 2 days then 2 tabs po qd x 2 days then 1 tab po qd x 2 days  Patient not taking: Reported on 5/18/2022 5/7/22 5/18/22  Delfina Gentile PA-C       Current Facility-Administered Medications   Medication Dose Route Frequency    diazepam (VALIUM) injection 10 mg  10 mg Intravenous Once    enoxaparin (LOVENOX) subcutaneous injection 40 mg  40 mg Subcutaneous Daily    folic acid (FOLVITE) tablet 1 mg  1 mg Oral Daily    multivitamin-minerals (CENTRUM) tablet 1 tablet  1 tablet Oral Daily    neomycin-bacitracin-polymyxin b (NEOSPORIN) ointment 2 small application  2 small application Topical BID    nicotine (NICODERM CQ) 21 mg/24 hr TD 24 hr patch 1 patch  1 patch Transdermal Daily    sodium chloride 0 9 % infusion  125 mL/hr Intravenous Continuous    thiamine tablet 100 mg  100 mg Oral Daily       Continuous Infusions:sodium chloride, 125 mL/hr, Last Rate: 125 mL/hr (05/18/22 1107) Objective       Intake/Output Summary (Last 24 hours) at 5/18/2022 1213  Last data filed at 5/18/2022 1107  Gross per 24 hour   Intake 480 ml   Output --   Net 480 ml       Invasive Devices:   Peripheral IV 05/18/22 Left Forearm (Active)   Site Assessment Clean;Dry; Intact 05/18/22 1052   Dressing Status Clean; Intact;Dry 05/18/22 1052       Vitals   Vitals:    05/18/22 0558 05/18/22 0621 05/18/22 0842 05/18/22 1032   BP: 159/77 159/77 144/94 139/76   TempSrc: Tympanic  Oral Temporal   Pulse: (!) 127 (!) 127 89 100   Resp: 20 18 18   Patient Position - Orthostatic VS: Sitting  Lying    Temp: (!) 96 5 °F (35 8 °C)  98 2 °F (36 8 °C) 99 8 °F (37 7 °C)       Physical Exam  Vitals and nursing note reviewed  Constitutional:       General: He is in acute distress (Mild due to withdrawal symptoms)  Appearance: He is not toxic-appearing  HENT:      Head: Normocephalic and atraumatic  Right Ear: External ear normal       Left Ear: External ear normal       Nose: Nose normal       Mouth/Throat:      Mouth: Mucous membranes are moist       Pharynx: Oropharynx is clear  Eyes:      General: No scleral icterus  Right eye: No discharge  Left eye: No discharge  Extraocular Movements: Extraocular movements intact  Conjunctiva/sclera: Conjunctivae normal       Pupils: Pupils are equal, round, and reactive to light  Cardiovascular:      Rate and Rhythm: Regular rhythm  Tachycardia present  Heart sounds: Normal heart sounds  No murmur heard  Pulmonary:      Effort: Pulmonary effort is normal       Breath sounds: Wheezing ( trace, bilateral) present  No rhonchi or rales  Abdominal:      General: Abdomen is flat  Palpations: There is no mass  Tenderness: There is no abdominal tenderness  There is no guarding or rebound  Musculoskeletal:         General: No tenderness or deformity  Normal range of motion  Cervical back: Normal range of motion  No rigidity  Legs:    Lymphadenopathy:      Cervical: No cervical adenopathy  Skin:     General: Skin is warm and dry  Capillary Refill: Capillary refill takes less than 2 seconds  Coloration: Skin is not jaundiced  Findings: No rash  Neurological:      Mental Status: He is alert and oriented to person, place, and time  Motor: Tremor present  Psychiatric:         Attention and Perception: He does not perceive auditory or visual hallucinations  Mood and Affect: Mood is anxious  Behavior: Behavior is agitated ( mild)  Data:    EKG, Pathology, and Other Studies: I have personally reviewed pertinent reports  EKG:  Sinus tachycardia    No evidence of ischemia or dysrhythmia    Lab Results:  CBC ETOH     Lab Results   Component Value Date    WBC 11 29 (H) 05/18/2022    RBC 5 12 05/18/2022    HGB 16 7 05/18/2022    HCT 48 4 05/18/2022    MCV 95 05/18/2022    MCH 32 6 05/18/2022    MCHC 34 5 05/18/2022    RDW 15 4 (H) 05/18/2022     05/18/2022    MPV 8 2 (L) 05/18/2022      No results found for: LACTICACID   CMP UA         Component Value Date/Time    K 4 5 05/18/2022 0615     05/18/2022 0615    CO2 23 05/18/2022 0615    BUN 10 05/18/2022 0615    CREATININE 0 72 05/18/2022 0615         Component Value Date/Time    CALCIUM 8 7 05/18/2022 0615    ALKPHOS 104 05/18/2022 0615     (H) 05/18/2022 0615    ALT 86 (H) 05/18/2022 0615      No results found for: CLARITYU, COLORU, SPECGRAV, PHUR, GLUCOSEU, KETONESU, BLOODU, PROTEIN UA, NITRITE, BILIRUBINUR, UROBILINOGEN, LEUKOCYTESUR, WBCUA, RBCUA, HYALINE, BACTERIA, EPIS     Liver Function Test: ASA     Lab Results   Component Value Date    TBILI 1 10 05/18/2022    ALKPHOS 104 05/18/2022     (H) 05/18/2022    ALT 86 (H) 05/18/2022    TP 8 0 05/18/2022    ALB 4 7 05/18/2022      No results found for: SALICYLATE   Troponin APAP     Lab Results   Component Value Date    TROPONINI <0 02 01/26/2019      No results found for: ACTMNPHEN   VBG HCG     No results found for: PHVEN, UKG2YOL, PO2VEN, CLF7CLH, BEVEN, R8MXFYRBL, X6JVPYT   No results found for: HCGQUANT   ABG Urine Drug Screen     No results found for: PHART, JRM9ZTY, PO2ART, JMW4SJJ, BEART, D3PAWWCHZ, O2HGB, SOURC, ANDERSON, VTAC, ACRATE, INSPIREDAIR, PEEP   Lab Results   Component Value Date    AMPMETHUR Negative 05/18/2022    BARBTUR Negative 05/18/2022    BDZUR Negative 05/18/2022    COCAINEUR Negative 05/18/2022    METHADONEUR Negative 05/18/2022    OPIATEUR Negative 05/18/2022    PCPUR Negative 05/18/2022    THCUR Negative 05/18/2022    OXYCODONEUR Negative 05/18/2022      Lactate INR     No results found for: LACTICACID   No results found for: INR   PTT Protime     No results found for: PTT     No results found for: PROTIME           Imaging Studies: I have personally reviewed pertinent reports  Counseling / Coordination of Care  Total floor / unit time spent today 45 minutes  Greater than 50% of total time was spent with the patient and / or family counseling and / or coordination of care  Minutes of critical care time 39  -Critical care time was exclusive of separately billable procedures and teaching time    -Critical care was necessary to treat or prevent imminent or life-threatening deterioration of the following condition: CNS failure/compromise, toxidrome (ethanol withdrawal),  withdrawal  -Critical care time was spent personally by me on the following activities as well as the above as per the course and rest of chart: obtaining history from patient/surrogate, development of a treatment plan, discussions with referring provider(s), evaluation of patient's response to the treatment, examination of the patient, performing treatments and interventions, re-evaluation of the patient's condition, review of old charts, ordering/interpreting laboratory studies, ordering/interpreting of radiographic studies        ** Please Note: This note has been constructed using a voice recognition system   **

## 2022-05-18 NOTE — ASSESSMENT & PLAN NOTE
· Patient reports that after prior inpatient rehabilitation stay at [facility] in November of 2021 he went directly to a shelter and was there until 10 days ago when he started drinking again  · Patient requests inpatient treatment at a rehabilitation center after discharge  · Inpatient consult to case management

## 2022-05-18 NOTE — ASSESSMENT & PLAN NOTE
· Treatment for withdrawal as above  · Daily thiamine, folate, MVI  · Encourage cessation  · Case management consult- patient is interested in inpatient drug and alcohol rehab upon discharge   Placement found for patient at [facility]
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
## 2022-05-19 LAB
ALBUMIN SERPL BCP-MCNC: 3.1 G/DL (ref 3–5.2)
ALP SERPL-CCNC: 93 U/L (ref 43–122)
ALT SERPL W P-5'-P-CCNC: 69 U/L
ANION GAP SERPL CALCULATED.3IONS-SCNC: 4 MMOL/L (ref 5–14)
AST SERPL W P-5'-P-CCNC: 70 U/L (ref 17–59)
BILIRUB SERPL-MCNC: 1.27 MG/DL
BUN SERPL-MCNC: 12 MG/DL (ref 5–25)
CALCIUM ALBUM COR SERPL-MCNC: 8.7 MG/DL (ref 8.3–10.1)
CALCIUM SERPL-MCNC: 8 MG/DL (ref 8.4–10.2)
CHLORIDE SERPL-SCNC: 105 MMOL/L (ref 97–108)
CO2 SERPL-SCNC: 27 MMOL/L (ref 22–30)
CREAT SERPL-MCNC: 0.66 MG/DL (ref 0.7–1.5)
ERYTHROCYTE [DISTWIDTH] IN BLOOD BY AUTOMATED COUNT: 16 % (ref 11.6–15.1)
GFR SERPL CREATININE-BSD FRML MDRD: 107 ML/MIN/1.73SQ M
GLUCOSE SERPL-MCNC: 111 MG/DL (ref 70–99)
HCT VFR BLD AUTO: 41.8 % (ref 36.5–49.3)
HGB BLD-MCNC: 14 G/DL (ref 12–17)
MAGNESIUM SERPL-MCNC: 1.9 MG/DL (ref 1.6–2.3)
MCH RBC QN AUTO: 33.3 PG (ref 26.8–34.3)
MCHC RBC AUTO-ENTMCNC: 33.5 G/DL (ref 31.4–37.4)
MCV RBC AUTO: 100 FL (ref 82–98)
PLATELET # BLD AUTO: 202 THOUSANDS/UL (ref 149–390)
PMV BLD AUTO: 9.6 FL (ref 8.9–12.7)
POTASSIUM SERPL-SCNC: 3.8 MMOL/L (ref 3.6–5)
PROT SERPL-MCNC: 5.8 G/DL (ref 5.9–8.4)
RBC # BLD AUTO: 4.2 MILLION/UL (ref 3.88–5.62)
SODIUM SERPL-SCNC: 136 MMOL/L (ref 137–147)
WBC # BLD AUTO: 6.91 THOUSAND/UL (ref 4.31–10.16)

## 2022-05-19 PROCEDURE — 99232 SBSQ HOSP IP/OBS MODERATE 35: CPT | Performed by: EMERGENCY MEDICINE

## 2022-05-19 PROCEDURE — 0HCDXZZ EXTIRPATION OF MATTER FROM RIGHT LOWER ARM SKIN, EXTERNAL APPROACH: ICD-10-PCS | Performed by: EMERGENCY MEDICINE

## 2022-05-19 PROCEDURE — NC001 PR NO CHARGE

## 2022-05-19 PROCEDURE — 80053 COMPREHEN METABOLIC PANEL: CPT | Performed by: PHYSICIAN ASSISTANT

## 2022-05-19 PROCEDURE — 10120 INC&RMVL FB SUBQ TISS SMPL: CPT

## 2022-05-19 PROCEDURE — 85027 COMPLETE CBC AUTOMATED: CPT | Performed by: PHYSICIAN ASSISTANT

## 2022-05-19 PROCEDURE — 83735 ASSAY OF MAGNESIUM: CPT | Performed by: PHYSICIAN ASSISTANT

## 2022-05-19 RX ORDER — PHENOBARBITAL SODIUM 130 MG/ML
130 INJECTION INTRAMUSCULAR ONCE
Status: COMPLETED | OUTPATIENT
Start: 2022-05-19 | End: 2022-05-19

## 2022-05-19 RX ORDER — DIAPER,BRIEF,INFANT-TODD,DISP
EACH MISCELLANEOUS 4 TIMES DAILY PRN
Status: DISCONTINUED | OUTPATIENT
Start: 2022-05-19 | End: 2022-05-20 | Stop reason: HOSPADM

## 2022-05-19 RX ORDER — DOXYCYCLINE HYCLATE 100 MG/1
200 CAPSULE ORAL ONCE
Status: COMPLETED | OUTPATIENT
Start: 2022-05-19 | End: 2022-05-19

## 2022-05-19 RX ORDER — ALBUTEROL SULFATE 90 UG/1
2 AEROSOL, METERED RESPIRATORY (INHALATION) EVERY 4 HOURS PRN
Status: DISCONTINUED | OUTPATIENT
Start: 2022-05-19 | End: 2022-05-20 | Stop reason: HOSPADM

## 2022-05-19 RX ORDER — PHENOBARBITAL SODIUM 65 MG/ML
65 INJECTION INTRAMUSCULAR ONCE
Status: COMPLETED | OUTPATIENT
Start: 2022-05-19 | End: 2022-05-19

## 2022-05-19 RX ADMIN — ACETAMINOPHEN 650 MG: 325 TABLET ORAL at 18:54

## 2022-05-19 RX ADMIN — ALBUTEROL SULFATE 2 PUFF: 90 AEROSOL, METERED RESPIRATORY (INHALATION) at 10:12

## 2022-05-19 RX ADMIN — FOLIC ACID 1 MG: 1 TABLET ORAL at 08:35

## 2022-05-19 RX ADMIN — PHENOBARBITAL SODIUM 130 MG: 130 INJECTION INTRAMUSCULAR; INTRAVENOUS at 04:00

## 2022-05-19 RX ADMIN — MULTIPLE VITAMINS W/ MINERALS TAB 1 TABLET: TAB ORAL at 08:35

## 2022-05-19 RX ADMIN — BACITRACIN ZINC, NEOMYCIN, POLYMYXIN B 2 SMALL APPLICATION: 400; 3.5; 5 OINTMENT TOPICAL at 09:51

## 2022-05-19 RX ADMIN — PHENOBARBITAL SODIUM 130 MG: 130 INJECTION INTRAMUSCULAR; INTRAVENOUS at 10:12

## 2022-05-19 RX ADMIN — DOXYCYCLINE 200 MG: 100 CAPSULE ORAL at 09:39

## 2022-05-19 RX ADMIN — PHENOBARBITAL SODIUM 130 MG: 130 INJECTION INTRAMUSCULAR; INTRAVENOUS at 17:17

## 2022-05-19 RX ADMIN — HYDROCORTISONE: 1 OINTMENT TOPICAL at 18:09

## 2022-05-19 RX ADMIN — THIAMINE HCL TAB 100 MG 100 MG: 100 TAB at 08:35

## 2022-05-19 RX ADMIN — PHENOBARBITAL SODIUM 65 MG: 65 INJECTION INTRAMUSCULAR; INTRAVENOUS at 19:31

## 2022-05-19 RX ADMIN — SODIUM CHLORIDE 125 ML/HR: 0.9 INJECTION, SOLUTION INTRAVENOUS at 11:14

## 2022-05-19 RX ADMIN — ENOXAPARIN SODIUM 40 MG: 100 INJECTION SUBCUTANEOUS at 08:35

## 2022-05-19 RX ADMIN — ACETAMINOPHEN 650 MG: 325 TABLET ORAL at 09:39

## 2022-05-19 RX ADMIN — PHENOBARBITAL SODIUM 130 MG: 130 INJECTION INTRAMUSCULAR; INTRAVENOUS at 10:13

## 2022-05-19 RX ADMIN — SODIUM CHLORIDE 125 ML/HR: 0.9 INJECTION, SOLUTION INTRAVENOUS at 03:55

## 2022-05-19 NOTE — PROGRESS NOTES
05/19/22 9934   Referral Data   Referral Source Patient   Referral Reason Drug/Alcohol [place] [address]   Readmission Root Cause   30 Day Readmission No   Patient Information   Mental Status Alert   Primary Caregiver Self   Support System Immediate family   Legal Information   Legal Issues pt denies   Health Care Proxy Appointed No   Activities of Daily Living Prior to Admission   Functional Status Independent   Assistive Device No device needed   Living Arrangement Homeless   Ambulation Independent   Access to Firearms   Access to Firearms No  (pt denies)   Income Information   Income Source Unemployed   [employer]   Means of Transport to [facility]: License Suspended/Revoked     Pt is a 61year old   male who was admitted to the detox for alcohol withdrawal  Pt had self presented at the [address] [place] [address] ED complaining of alcohol withdrawal and homelessness  Pt's name, date of birth, home address, and telephone number were verified  Pt was informed of case management role and the purpose of the completion of intake with case management  Pt presented as cooperative but with brief responses to questions  Pt reports drinking a 12 pack of beer and 4 shots daily for the last 10 days  Pt reports [facility] to his [facility] use and had to leave due to this use  Pt reports last use on 5/18/2022 of 12 pack of beer and first use at age 7  Pt reports hx of methamphetamine and cocaine abuse over 5 years ago  Pt reports nicotine use of 1 PPD cigarette smoking,  Pt reports a period of abstinence of 9 months at one time  Pt reports a hx inpatient tx, last 11/21 WDR, and previous outpatient treatment  Pt reports previous 12 step meeting attendance  Pt denied current withdrawal symptoms  Pt reports common of tremors, headache, and anxiety  Pt denied hx of withdrawal seizures, Dt's, or hallucinations   Pt reports a family history of substance abuse- sister  AUDIT: 37  PAWSS: 5  Alcohol breath in ED: 0 15  UDS: negative for all    Pt denied any previous mental health diagnosis or treatment  Pt denied any SI or HI, denied AH/VH, denied hx of abuse or trauma, denied access to firearms, denied family hx of mental health  Pt has current chronic medical conditions of COPD and hypertension  Pt has current PCP of [facility], NIKHIL signed  This office contacted at [phone] and informed of pt's admission  Pt has current health insurance of [employer], NIKHIL signed, and no preferred pharmacy  Pt denied legal issues but reports a hx of DUI's  Pt reports last over 10 years ago  Pt reports he is unemployed  Pt reports achieving a high school diploma  Pt reports he has a revoked license and will walk for transport needs  Pt denied  service and denied any religous or cultural request     Pt reports he is currently homeless and had [place] staying [place] the woods since being asked to leave the [facility] for his alcohol use  Pt signed an NIKHIL for Izaiah Menendez, sister, and she was contacted at [phone]  A message was left requesting a return call  Pt and Cm completed relapse prevention plan  Pt and Cm signed plan and pt received a copy of this plan  Pt struggled to identify triggers and could not identify any coping skills  Pt states he is interested in inpatient substance use treatment at this time  Pt signed NIKHIL's for both [facility] and [facility]  Pt's clinical presentation indicates that pt presents with no insight into his recovery needs even though pt has had multiple treatments  Pt current presentation displays that pt may not not be ready for abstinence but rather wants to address his homelessness  Pt's goals for detox are to successfully complete the medical withdrawals and to transfer to inpatient substance use treatment  Pt presents in the contemplation stage of change

## 2022-05-19 NOTE — UTILIZATION REVIEW
Initial Clinical Review    Admission: Date/Time/Statement:   Admission Orders (From admission, onward)     Ordered        05/18/22 0925  INPATIENT ADMISSION  Once                      Orders Placed This Encounter   Procedures    INPATIENT ADMISSION     Standing Status:   Standing     Number of Occurrences:   1     Order Specific Question:   Level of Care     Answer:   Med Surg [16]     Order Specific Question:   Estimated length of stay     Answer:   More than 2 Midnights     Order Specific Question:   Certification     Answer:   I certify that inpatient services are medically necessary for this patient for a duration of greater than two midnights  See H&P and MD Progress Notes for additional information about the patient's course of treatment  ED Arrival Information     Expected   -    Arrival   5/18/2022 05:39    Acuity   Emergent            Means of arrival   Walk-In    Escorted by   Jamshid Leonard [facility] Toxicology    Admission type   Emergency            Arrival complaint   detox eval           Chief Complaint   Patient presents with    Detox Evaluation     Wants detox for alcohol  Drinks a 12 pack of beer a day and a couple of swishers of whiskey  having nausea and anxiety at the moment        Initial Presentation: 61 y o  male who presented to [facility] for medical detox  Inpatient admission for evaluation and treatment of alcohol withdrawal syndrome  PMHx: COPD  Presented w/ request for detox from alcohol  Reports 12 pack beer & 4 shots of whiskey daily, last drink on 5/18 @ 0700  Has prior inpatient & no outpatient treatment for withdrawal  Reports no hx of withdrawal seizures  On exam, anxiety, tremors, agitation, tachycardic, wheezing, ulceration on b/l 5th toes  SEWS 13  Plan: SEWS monitoring w/ phenobarbital management, thiamine/folic acid supplement, IVF, telemetry, continuous pulse ox, continue home meds, local wound care, trend labs, replete electrolytes as needed  Date: 05/19/22       Day 2: Pt reports feeling improved, but continues w/ anxiety and nsauea  On exam, tremors, tick removed from R forearm w/ forceps, anxious  SEWS 0  Plan: continue SEWS monitoring w/ phenobarbital management, thiamine/folic acid supplement, IVF, local wound care, telemetry, continuous pulse ox, continue home meds, trend labs, replete electrolytes as needed       ED Triage Vitals   Temperature Pulse Respirations Blood Pressure SpO2   05/18/22 0558 05/18/22 0558 05/18/22 0558 05/18/22 0558 05/18/22 0558   (!) 96 5 °F (35 8 °C) (!) 127 20 159/77 96 %      Temp Source Heart Rate Source Patient Position - Orthostatic VS BP Location FiO2 (%)   05/18/22 0558 05/18/22 0558 05/18/22 0558 05/18/22 0558 --   Tympanic Monitor Sitting Left arm       Pain Score       05/18/22 0848       No Pain          Wt Readings from Last 1 Encounters:   05/18/22 69 8 kg (153 lb 14 4 oz)     Additional Vital Signs:   Date/Time Temp Pulse Resp BP SpO2 Calculated FIO2 (%) - Nasal Cannula Nasal Cannula O2 Flow Rate  O2 Device   05/19/22 1130 99 1 °F (37 3 °C) 88 18 131/83 91 % -- -- None (Room air)   05/19/22 0827 98 9 °F (37 2 °C) 79 17 147/82 92 % -- -- None (Room air)   05/19/22 0351 98 4 °F (36 9 °C) 82 18 147/88 98 % 24 1 L/min Nasal cannula   05/18/22 1948 99 1 °F (37 3 °C) 110 Abnormal  18 136/97 99 % 24 1 L/min Nasal cannula   05/18/22 1650 -- -- -- -- 91 % 24 1 L/min Nasal cannula   05/18/22 1645 -- -- -- -- 88 % Abnormal  -- -- None (Room air)   05/18/22 1600 99 6 °F (37 6 °C) 112 Abnormal  18 142/82 94 % -- -- None (Room air)   05/18/22 1500 -- 123 Abnormal  -- 142/111 Abnormal  -- -- -- --   05/18/22 1032 99 8 °F (37 7 °C) 100 18 139/76 -- -- -- --   05/18/22 1029 -- -- -- -- 93 % -- -- None (Room air)   05/18/22 0842 98 2 °F (36 8 °C) 89 18 144/94 94 % -- -- None (Room air)   05/18/22 0621 -- 127 Abnormal  -- 159/77 -- -- -- --       Severity of Ethanol Withdrawal Scale (SEWS)   05/19/22 1200 05/19/22 1050 05/19/22 0945 05/19/22 0545 05/19/22 0444   ANXIETY: Do you feel that something bad is about to happen to you right now? 0  -BH 0  -BH 0  -BH 0  -DE 0  -DE   NAUSEA and DRY HEAVES or VOMITING? 0  -BH 0  -BH 0  -BH 0  -DE 0  -DE   SWEATING: (includes moist palms, sweating now)? Score 0 or 2 0  -BH 0  -BH 0  -BH 0  -DE 0  -DE   TREMOR: with arms extended eyes closed? 0  -BH 0  -BH 0  -BH 0  -DE 0  -DE   AGITATION: Fidgety, restless, pacing? 0  -BH 0  -BH 0  -BH 0  -DE 0  -DE   DISORIENTATION: 0  -BH 0  -BH 0  -BH 0  -DE 0  -DE   HALLUCINATIONS: 0  -BH 0  -BH 0  -BH 0  -DE 0  -DE   VITAL SIGNS: ANY (Pulse >177, Diastolic BP >00, Temp >32 6) 0  -BH 0  -BH 0  -BH 0  -DE 0  -DE   SEWS Total Score 0  -BH 0  -BH 0  -BH 0  -DE 0  -DE   Kern Agitation Sedation Scale (RASS) 0  -BH -2  -BH 0  -BH -2  -DE -2  -DE    05/19/22 4053 05/19/22 0123 05/18/22 2115 05/18/22 2015 05/18/22 1949   ANXIETY: Do you feel that something bad is about to happen to you right now? 3  -DE 0  -DE 0  -DE 0  -DE 3  -DE   NAUSEA and DRY HEAVES or VOMITING? 3  -DE 0  -DE 0  -DE 0  -DE 0  -DE   SWEATING: (includes moist palms, sweating now)? Score 0 or 2 0  -DE 0  -DE 0  -DE 0  -DE 0  -DE   TREMOR: with arms extended eyes closed? 2  -DE 0  -DE 0  -DE 0  -DE 2  -DE   AGITATION: Fidgety, restless, pacing? 0  -DE 0  -DE 0  -DE 0  -DE 0  -DE   DISORIENTATION: 0  -DE 0  -DE 0  -DE 0  -DE 0  -DE   HALLUCINATIONS: 0  -DE 0  -DE 0  -DE 0  -DE 0  -DE   VITAL SIGNS: ANY (Pulse >200, Diastolic BP >27, Temp >93 4) 0  -DE 0  -DE 0  -DE 0  -DE 3  -DE   SEWS Total Score 8  -DE 0  -DE 0  -DE 0  -DE 8  -DE   Kern Agitation Sedation Scale (RASS) 0  -DE -2  -DE -2  asleep  -DE -2  -DE 0  -DE    05/18/22 1800 05/18/22 1700 05/18/22 1600 05/18/22 1507    ANXIETY: Do you feel that something bad is about to happen to you right now?   0  -MH 0  -MH 0  -MH 3  -TO    NAUSEA and DRY HEAVES or VOMITING? 0  -MH 0  -MH 0  -MH 0  -TO    SWEATING: (includes moist palms, sweating now)? Score 0 or 2 0  -MH 0  -MH 0  -MH 2  -TO    TREMOR: with arms extended eyes closed? 0  -MH 0  -MH 0  -MH 2  -TO    AGITATION: Fidgety, restless, pacing?  0  -MH 0  -MH 0  -MH 3  -TO    DISORIENTATION: 0  -MH 0  -MH 0  -MH 0  -TO    HALLUCINATIONS: 0  -MH 0  -MH 0  -MH 0  -TO    VITAL SIGNS: ANY (Pulse >133, Diastolic BP >73, Temp >22 2) 0  -MH 0  -MH 3  -MH 3  -TO    SEWS Total Score 0  -MH 0  -MH 3  -MH 13  -TO    Kern Agitation Sedation Scale (RASS) -1  -MH -1  -MH 0  -MH +1  -TO          Pertinent Labs/Diagnostic Test Results:   5/18 - EKG  Sinus tachycardia    Results from last 7 days   Lab Units 05/19/22  0435 05/18/22  0615   WBC Thousand/uL 6 91 11 29*   HEMOGLOBIN g/dL 14 0 16 7   HEMATOCRIT % 41 8 48 4   PLATELETS Thousands/uL 202 260   NEUTROS ABS Thousands/µL  --  8 04*         Results from last 7 days   Lab Units 05/19/22  0435 05/18/22  0615   SODIUM mmol/L 136* 137   POTASSIUM mmol/L 3 8 4 5   CHLORIDE mmol/L 105 102   CO2 mmol/L 27 23   ANION GAP mmol/L 4* 12   BUN mg/dL 12 10   CREATININE mg/dL 0 66* 0 72   EGFR ml/min/1 73sq m 107 103   CALCIUM mg/dL 8 0* 8 7   MAGNESIUM mg/dL 1 9 1 8     Results from last 7 days   Lab Units 05/19/22  0435 05/18/22  0615   AST U/L 70* 108*   ALT U/L 69* 86*   ALK PHOS U/L 93 104   TOTAL PROTEIN g/dL 5 8* 8 0   ALBUMIN g/dL 3 1 4 7   TOTAL BILIRUBIN mg/dL 1 27 1 10     Results from last 7 days   Lab Units 05/19/22  0435 05/18/22  0615   GLUCOSE RANDOM mg/dL 111* 102*     Results from last 7 days   Lab Units 05/18/22  0631   AMPH/METH  Negative   BARBITURATE UR  Negative   BENZODIAZEPINE UR  Negative   COCAINE UR  Negative   METHADONE URINE  Negative   OPIATE UR  Negative   PCP UR  Negative   THC UR  Negative       ED Treatment:   Medication Administration from 05/18/2022 0538 to 05/18/2022 1019       Date/Time Order Dose Route Action     05/18/2022 0638 ibuprofen (MOTRIN) tablet 600 mg 600 mg Oral Given     05/18/2022 0638 LORazepam (ATIVAN) injection 1 mg 1 mg Intravenous Given     05/18/2022 0638 ondansetron (ZOFRAN) injection 4 mg 4 mg Intravenous Given        Past Medical History:   Diagnosis Date    COPD (chronic obstructive pulmonary disease) ([facility] )      Present on Admission:   Nicotine dependence   COPD (chronic obstructive pulmonary disease) ([facility] )   Open wound of foot, initial encounter   Tick bite      Admitting Diagnosis: Alcohol withdrawal ([place] [address] ) [F10 239]  Alcohol abuse [F10 10]  Encounter for assessment of alcohol and drug use [Z76 89]  Age/Sex: 61 y o  male  Admission Orders:  Regular Diet  SCDs  Fall & Seizure Precautions  SEWS monitoring  Telemetry & Continuous Pulse Ox  Scheduled Medications:  enoxaparin, 40 mg, Subcutaneous, Daily  folic acid, 1 mg, Oral, Daily  multivitamin-minerals, 1 tablet, Oral, Daily  neomycin-bacitracin-polymyxin b, 2 small application, Topical, BID  nicotine, 1 patch, Transdermal, Daily  thiamine, 100 mg, Oral, Daily      Continuous IV Infusions:  sodium chloride, 125 mL/hr, Intravenous, Continuous      PRN Meds:  acetaminophen, 650 mg, Oral, Q6H PRN; 5/19 x1  albuterol, 2 puff, Inhalation, Q4H PRN; 5/19 x1  diazepam, 10 mg, Intravenous; 5/18 x1  phenobarbital, 650 mg, Intravenous; 5/18 x1  phenobarbital, 130 mg, Intravenous; 5/18 x1, 5/19 x4  phenobarbital, 64 8 mg, Oral; 5/18 x1        IP CONSULT TO CASE MANAGEMENT    Network Utilization Review Department  ATTENTION: Please call with any questions or concerns to [phone] and carefully listen to the prompts so that you are directed to the right person  All voicemails are confidential   José Dobson all requests for admission clinical reviews, approved or denied determinations and any other requests to dedicated fax number below belonging to the campus where the patient is receiving treatment   List of dedicated fax numbers for the Facilities:  FACILITY NAME UR FAX NUMBER   ADMISSION DENIALS (Administrative/Medical Necessity) [phone]   PARENT CHILD HEALTH (Maternity/NICU/Pediatrics) [address] [facility] [facility] 40 [address] [facility]  [phone]   Erin Rader [address] [facility] [address] [id number] [place] [address] [address] [facility] [address] [employer] [phone]

## 2022-05-19 NOTE — CASE MANAGEMENT
Case Management Progress Note    Patient name Pankaj Po  Location 5T DETOX 503/5T DETOX 50* MRN [id number]  : 1965 Date 2022       LOS (days): 1  Geometric Mean LOS (GMLOS) (days):   Days to GMLOS:        OBJECTIVE:Discharge planning        Current admission status: Inpatient  Preferred Pharmacy:   [address], [address]  [address] [place] [address]  Phone: [phone] Fax: [phone]    [facility]/pharmacy #[id number] [address]  Phone: [phone] Fax: [phone]    Primary Care Provider: Barney Raymond MD    Primary Insurance: Inocencio Jimenez  Secondary Insurance:     PROGRESS NOTE: CM contacted [place] [phone] for possible patient placement tomorrow after cm consulted with medical staff regarding pt length of stay  Cm sent referral to [facility] inpatient substance use treatment

## 2022-05-19 NOTE — DISCHARGE INSTR - OTHER ORDERS
Drug and Alcohol Resources in [facility]    If you have health insurance, including medical assistance, there should be a phone number on your insurance card that you can call to find out how to access services  The card may say, For Una Lemos or For Drug and Alcohol Services or For Substance Abuse Services call the number provided  Or contact 8-055-633-YPXD    The [facility] for Opioid Use Disorder (MARIELA)  [facility] provides care management for adults with Opioid Use Disorder  [facility] has a team of care managers who will help individuals get into treatment, coordinate behavioral and physical health care, and provide recovery support and guidance  Care managers will also provide information about Medication-Assisted Treatment  Contact [phone]    [facility] Drug and Alcohol Administration [phone] For additional information, contact the Department of Human Services Information and Referral Unit at [phone] between the hours of 8:30 a m  and 4:30 p m , Monday through Friday  Recovery Centers  These centers offer a safe, sober environment to those in recovery  A variety of programming including 12-Step Meetings, Rhoderick Care, Life Skills Workshops, etc  is offered at each location  Recovery Centers  These centers offer a safe, sober environment to those in recovery  A variety of programming including 12-Step Meetings, Rhoderick Care, Life Skills Workshops, etc  is offered at each location  7[facility], [address] [place] [address]  [phone]  www  cleanslatebangor  org Change on Main  [facility], [address] [facility]  [phone]  akklcl-af-[email or web address]  Humble Trujillo  [address], 210 [facility]  [phone]   [address] [place] [address]  [phone]  www  [facility], [address] [place] [address]  [phone]  [facility]  VELVET COSTA [place] FOR REHABILITATION is 48[address], Þorjeremias Leonidessarkis  Twila Whyte, Thursday from 1pm-5pm and Friday, Saturday from 11am-3pm    [address]   Confidential free help, from public health agencies, to find substance use treatment and information  [phone]    Link for Zoom Codes for Virtual 12 step Meetings PodPark tn  aspx    AA [facility]  If you feel you have a problem with alcohol, or if you simply want to know more about AA, call our 24-hour hotline at: [address]: [address] Meetings can be found at [email or web address] [email or web address]  NA Meeting list https://[facility]/   Pt and Cm completed relapse prevention plan  Pt and Cm signed plan and pt received a copy of this plan  Kaylin Strange 63:    [address] [facility],2E:   Obtain a housing voucher at the [facility] at NXTM  Photo identification will be required  Come to the [facility] [address] O between 3:30 P  M  and 7:30 P M  (Marlene Ventura is served at [employer] P M )  Para poder ser [employer] en esta MISSION tienes que hacer lo siguiente:  Tienes que obtener un boleto (voucher) de en la estacion de policia lynch  Y Watson  Christine identificacion con retrato es requerida    La entrada a la [place] es de 3:30 P M  Y 7:30 P M  (La comida sera servida a las 5:30 P M )      Local Food Bank Locations & Contact Information:  ERASTO Frias [address] [place] [address]  Municipalities:  [facility], [place], [facility], [place], [facility], [facility], [facility], [place],  [place], and [place]  Emergency Food Pantries  [place] [address]  [facility] [facility]  Address: Beau Lev[address], ÞorjeremiasBeau Fletcherhorace Abernathyprisca Smiley 5955  Phone: [phone]  Hours of Operation: Fridays 10:00AM-1:00PM  [facility]  Address: 1200 [facility], Þab [address] XIN Garcia/Matty Jaurgeui  Phone: [phone]  Hours of Operation: Thursdays 5:30PM-6:30PM    Manolo Rodriguez [address]  Address: [address], [place], [address] [place]  Phone: [phone]  Hours of Operation: Monday-Friday 9:30AM-12:00PM  [facility]  Address: [address] Randi Gage, [place], [address] [place]  Phone: [phone]  Hours of Operation: By appointment -- Mondays, Tuesdays, Thursdays, & Fridays 8:30AM-4:00PM;  Wednesdays 8:30AM-12:00PM  [facility]IA [facility]  Address: 4200 [facility], [place] [address] [place]  Phone: [phone]  Hours of Operation: 1st & 3rd Saturdays 10:00AM-12:00PM  [facility]  Address: [address], [place], [address] [place]  Phone: [phone]  Hours of Operation: 2nd & 4th Thursdays 4:00PM-5:30PM (Only 4th Thursdays during the summer)  [facility]  Address: [address] , [place], [address] [place]  Phone: [phone]  Hours of Operation: By appointment -- Wednesdays 6:00PM  [facility]  Address: 326 [facility], [place], Aundrea Mueller  Phone: [phone]  Hours of Operation: Thursdays 11:00AM-2:00PM or By appointment  [facility]  Address: [address], [place], [address] [place]  Phone: [phone]  Hours of Operation: Saturdays 9:00AM-11:30AM  [facility]  Address: 229 [facility], Aundrea [place] Er[place]  Phone: [phone]  Hours of Operation: Fridays, 3rd & 4th Saturdays 9:00AM-11:00AM  [address]  Address: 1703 [facility], [place], [address] [place]  Phone: [phone]  Hours of Operation: Tuesdays 3:00PM-5:00PM or By appointment  [employer]ing [employer]  Address: Manolo Senior, [address] [place] [address]  Phone: [phone]  Hours of Operation: By appointment  [facility]  Address: [address] [place], [address]  Phone: [phone]  Hours of Operation: 3rd Saturday, 8:00AM-11:00AM  RIPPLE  Address: [address] Lemos, [place] [address]  Phone: [phone]  Hours of Operation: 3rd Sunday 4:00PM-5:30PM    [address] [place] [address] Association [facility])  Address: Blaise Crawford, [facility], 98 [facility]  Phone: [phone]  Hours of Operation: 3rd Wednesday 9:00AM-4:00PM  [facility]  Address: [address], [facility], 98 [facility]  Phone: [phone]  Hours of Operation: 1st & 3rd Saturdays 9:00AM-11:30AM  [address]  Address: [address] [facility], [facility], 98 [facility]  Phone: [phone]  Hours of Operation: By appointment  [address] Pantry  Address: [address], [facility], [address] [place]  Phone: [phone]  Hours of Operation: 3rd Sunday 12:00PM-1:30PM  Idris [address] [place] [address]  Address: 2329 [facility], [address] [place]  Phone: [phone]  Hours of Operation: 1st & 3rd Thursdays 6:30PM-7:30PM; By appointment -- Mondays  [facility]  Address: [address] Anna Lowry, [facility], [address] [place]  Phone: [phone]  Hours of Operation: By appointment  [facility]  Address: [address], [facility], [address] [place]  Phone: [phone]  Hours of Operation: 1st & 3rd Wednesdays 4:00PM-5:30PM  [facility]  Address: 815 [facility], [facility], [address] [place]  Phone: [phone]  Hours of Operation: 4th Wednesday 6:30PM-7:30PM  [address] Open Door Pantry  Address: 27[address] [facility], [facility], [address] [place]  Phone: [phone]  Hours of Operation: 2nd Tuesday 10:00AM-12:00PM; 4th Thursday 4:00PM-6:00PM  [address] ([facility])  Address: Clayton Amador [address]  85 , [address]  Phone: [phone]  Hours of Operation: By appointment -- Monday-Friday 9:00AM-5:00PM  [address]  Address: 4199 [facility], [address] [place] [address]  Phone: [phone]  Hours of Operation: 1st & 3rd Tuesdays 9:00AM-10:30AM; Thursdays 6:00PM-8:00PM    Love and The IBillionaire  Address: [place], [place], [address] [place] [address]  Phone: [phone]  Hours of Operation: Every other Saturday 10:00AM-12:00PM  [facility]  Address: 545 [facility], [facility], 120 [facility]  Phone: [phone]  Hours of Operation: 3rd Monday 6:00PM-7:30PM  [facility]  Address: 37333 [facility], [facility], [address]  Phone: [phone]  Hours of Operation: 4th Sunday 9:00AM-11:00AM  [address], Giancarlo  Address: 241 [facility], [address]  Phone: [phone]  Hours of Operation: Saturdays 10:30AM-12:30PM  [facility]  Address: [facility], [address]  Phone: [phone]  Hours of Operation: By appointment -- Loletha Sheets 8:30AM-4:30PM  [facility] Pantry  Address: 201 [facility]essie [facility], [address]  Phone: [phone]  Hours of Operation: 1st & 3rd Wednesdays [address] [place] [address]  Address: [address]  Phone: [phone]  Hours of Operation: Wednesdays & Fridays 3:00PM-4:00PM  [facility] Pantry  Address: [address] [place] [address], [address] [place] [address]  Phone: [phone]  Hours of Operation: Every other Saturday 10:30AM-12:30PM  [address]  Address: Vladimir [address] [place] [address], [address] [place] [address]  Phone: [phone]  Hours of Operation: Tuesdays & Wednesdays 10:00AM-2:00PM; Closed last week of the month  [facility] [address] & [facility] at [address] [facility] [address]: Russ Tellez, [facility], [address] [place] [address]  Phone: [phone]  Hours of Operation: 1st Saturday 9:00AM-12:00PM; 3rd Thursday 4:00PM-7:00PM    [address]  Address: [facility], [address] [place] [address], [address] [place] [address]  Phone: [phone]  Hours of Operation: 3rd Monday & 3rd Wednesday 4:00PM-6:00PM; 3rd Saturday 10:00AM-12:00PM  [address]  Address: [address] [place] [address] Beatriz Wheeler, [address] [facility]  Phone: [phone]  Hours of Operation: 1st & 3rd Mondays 9:00AM-11:30AM  [address] [facility]  Address: [address]  Phone: [phone]  Hours of Operation: 2nd Saturday 9:00AM-11:00AM  [address] [facility]  Address: [address], [place], 23 Randi Kumari  Phone: [phone]  Hours of Operation: 1st, 2nd, & 3rd Thursdays 4:00PM-7:00PM; Last Saturday 9:30AM-12:00PM  [place] [facility]  Address: [address] [place] [address] 88, [address] [place]   Phone: [phone]  Hours of Operation: Tuesdays 6:00PM-7:00PM; Saturdays 4:00PM-5:00PM; Sundays 12:30PM-1:30PM  [place] [facility]  Address: [address] [place] [address], [address] [place]   Phone: [phone]  Hours of Operation: By appointment -- Mondays 6:00PM      [address] [place] [address]  Address: [address] [place] [address], [address] [place] [address]  Phone: [phone]  Hours of Operation: Monday-Friday 1:30PM-2:30PM  Lesly Alpers [facility])  Address: [address] [place] [address], [address] [place] [address]  Phone: [phone]  Hours of Operation: Monday-Friday 9:00AM-5:00PM  [facility] Kustom Codes  Address: [address] [place] [address], [address] [place] [address]  Phone: [phone]  Hours of Operation: Tuesday-Thursday 12:00PM-1:00PM    Shiloh Peters   Address: [address] [place] [address], [address] [place] [address]  Phone: [phone]  Hours of Operation: Sundays 8:00AM-9:00AM; Some holidays  [address] [place] [address]:  [place] John Kim OSLO, [place], [place], [address], and [address]  Address: [address] [place] [address],[address]Th Floor, [place] [facility]  Phone: [phone]  Hours of Operation: 2nd Tuesday 10:00AM-12:00PM  [address] [place] [address],[address] [place] [address]  Address: John Rutledge, Stacy NoveltyLab  Phone: [phone]  Hours of Operation: Wednesdays 10:00AM-12:00PM  [facility]  Address: [address] Lem[address], Stacy NoveltyLab  Phone: [phone]  Hours of Operation: 1st & 3rd Tuesdays 5:00PM-6:00PM  [facility]  Address: [address], [facility], [place], [address]  Phone: [phone]  Hours of Operation: Tuesdays 6:00PM-7:00PM  1801 [facility]  Address: 5351 [facility] , [facility], 210 [facility]  Phone: [phone]  Hours of Operation: 1st & 2nd Saturdays 12:00PM- 4:00PM  [place]ien Pantry  Address: [address], [facility], 210 [facility]  Phone: [phone]  Hours of Operation: Monday-Friday 10:30AM-11:30AM  [address] [place] [address]  Address: [address], [facility], 210 [facility]  Phone: [phone]  Hours of Operation: 3rd & 4th Saturdays 9:00AM-11:00AM; [facility] residents only    [facility]  Address: [address] [facility], [facility], ValKindred Healthcareuro 3  Phone: [phone]  Hours of Operation: 2nd Thursday 10:00AM-12:00PM  iSnai Mckenzie [facility] Pantry  Address: 900 [facility], [facility], [facility] 3  Phone: [phone]  Hours of Operation: Monday & Tuesday of the first full week of the month 9:00AM-11:00AM  [facility]  Address: 1 [facility], [facility], Valadouro 3  Phone: [phone]  Hours of Operation: Mondays, Wednesdays, & Thursdays 9:30AM-11:30AM  [address]  Address: [address] [place], [facility], [facility] 3  Phone: [phone]  Hours of Operation: 2nd & 4th Saturdays 10:00AM-12:00PM  [facility] 14  [facility]  Address: ROGEL[address], [facility], 7[address] N [facility]  Phone: [phone]  Hours of Operation: By appointment -- Tuesdays & Wednesdays 10:00AM-4:00PM, Thursdays 10:00AM-  12:00PM, & Sundays 4:00PM-7:00PM  [facility]  Address: [address] [place] [address], [address]  Phone: [phone]  Hours of Operation: 3rd Saturday 10:00AM-12:00PM  [address] [place] [address]  Address: [facility], [address]  Phone: [phone]  Hours of Operation: 3rd Saturday 9:00AM-11:30AM or by appointment  Irvin Luev[facility]  Address: [address], John, [address]  Phone: [phone]  Hours of Operation: 2nd & 4thTuesdays 10:00AM-12:00PM  [facility] Pantry  Address: Nataly  [address], John, [address]  Phone: [phone]  Hours of Operation: 1st, 2nd, & 4th Wednesdays 11:00AM-1:00PM; 3rd Wednesday [address] [facility] Pantry  Address: [address], John, [address]  Phone: [phone]  Hours of Operation: Wednesdays 10:00AM-12:00PM; Last Wednesday 6:00PM-8:00PM  [facility]  Address: [facility], [address] We[employer]els [place]  Phone: [phone]  Hours of Operation: Fridays 8:30AM-11:30AM & 1:30PM-3:30PM    [facility]  Address: DEENA Galvez [address] [place] [address], [address] textPlus  Phone: [phone]  Hours of Operation: By appointment--Mondays-Fridays 9:00AM -4:30PM   [facility]  Address: 100 [facility], 44 textPlus  Phone: [phone]  Hours of Operation: 1st & 3rd Tuesdays 6:00PM-7:30PM  [facility]  Address: 1013 [facility], 44 [employer] [place]  Phone: [phone]  Hours of Operation: By appointment -- Mondays-Fridays 9:00AM-5:00PM  [facility]  Address: 230 [facility], [address] [employer] [place]  Phone: [phone]  Hours of Operation: 3rd, 4th, & 5th Thursdays 5:00PM-7:00PM  Project [address]  Address: [address] [facility], [address] [employer] [place]  Phone: [phone]  Hours of Operation: Mondays 10:00AM-12:30PM; Thursdays 10:00AM-12:30PM & 1:00PM-3:30 PM  [facility]  Address: 7[address] Lee [facility], 4 Randi Olson[facility], [address] [place], [place] [address]one [place]  Phone: [phone]  Hours of Operation: Tuesdays 5:00PM-6:00PM  [address]  Address: [facility], [address] [place] [address] [place]  Phone: [phone]  Hours of Operation: Thursdays 6:00PM-7:30PM; Closed 5th Thursday  [address]  Address: [facility] Martinez [place], [address] [place] [address]  Phone: [phone]  Hours of Operation:  For last names beginning with A-M: 2nd Tuesday 8:00AM-10:00AM or 6:00PM-7:00PM;  For last names beginning with N-Z: 2nd Wednesday 8:00AM-10:00AM  [address]  Address: Erin Cuevas, Giovana Ortiz, [address]  Phone: [phone]  Hours of Operation: Wednesdays 9:30AM-12:00PM; 1st, 2nd, & 3rd Thursdays 6:00PM-8:00PM; 2nd & 3rd Saturdays 9:30AM-12:00PM  [place] - [address]  Address: 703 [facility], 205 [facility], [address]  Phone: [phone]  Hours of Operation: 3rd Saturday 9:00AM-11:00AM    [facility]CT [place]  Address: 201 [facility], 205 [facility], [address]  Phone: [phone]  Hours of Operation: Tuesdays 10:00AM-2:00PM  [place] [facility]  Address: [facility], 205 [facility], [address]  Phone: [phone]  Hours of Operation: Tuesdays 10:00AM-12:00PM; Closed 5th Tuesday  [place] - [address]  PUMP  Address: [address] [facility], [address] [place] [address]  Phone: [phone]  Hours of Operation: Mondays 11:00AM-12:30PM & 7:00PM-8:30PM  [place] - [address] Pantry  Address: [address] [facility], [address]  Phone: [phone]  Hours of Operation: Mondays 5:00PM-7:00PM  [address] /Ronny Naqviy  Address: 424 [facility] [address]  Phone: [phone]  Hours of Operation: 2nd & 4th Saturdays 9:00AM-10:00AM  [address] [facility] of Belford Roxo  Address: 2101 [facility], [facility]  Phone: [phone]  Hours of Operation: 2nd Saturday - Lunch (Call for times)  [facility]  Address: ROGELIO Bates [facility], John, Emeli[address] Jauregui  Phone: [phone]  Ours of Operation: Sundays 1:00PM-2:00PM  820 [facility]  Address: 700 [facility], John, Emeli N [address]a Jauregui  Phone: [phone]  Hours of Operation: Saturdays 12:00PM-1:00PM  ELI Rinaldi  Address: [address]  Phone: [phone]  Hours of Operation: Monday-Friday 8:00AM-4:00PM  [facility]  Address: [facility], 7[address] N [facility]  Phone: [phone]  Hours of Operation: Monday-Friday 12:00PM-1:00PM    [facility]  Address: 1100 [facility], [address] [place] [address]  Phone: [phone]  Hours of Operation: Call for [facility]  Address: [address] [place] [address], [address] [place] [address]  Phone: [phone]  Hours of Operation: Monday-Friday 12:00PM-1:00PM

## 2022-05-19 NOTE — PROGRESS NOTES
PROGRESS NOTE  DEPARTMENT OF MEDICAL TOXICOLOGY  LEVEL 4 MEDICAL DETOX UNIT  Khai Warren 61 y o  male MRN: [id number]  Unit/Bed#: 5T DETOX 503-01 Encounter: [id number]      Reason for Admission/Principal Problem: Alcohol withdrawal with complication    Rounding Provider: Verónica Hickey MD  Attending Provider: Verónica Hickey MD   5/18/2022  5:58 AM         * Alcohol withdrawal syndrome with complication [facility])  Assessment & Plan  · Has thus far received 975 mg of phenobarbital for withdrawal  · Giving additional 390 mg now due to ongoing withdrawal symptoms  · Continue SEWS protocol  · Telemetry and continuous pulse-ox         Alcohol use disorder, severe, dependence ([facility] )  Assessment & Plan  · Treatment for withdrawal as above  · Daily thiamine, folate, MVI  · Encourage cessation  · Case management consult       Transaminitis  Assessment & Plan  · Likely due to chronic alcohol use  · Stable, continue to monitor    Open wound of foot, initial encounter  Assessment & Plan  · Patient has superficial wounds of the little toes bilaterally due to walking long distances without socks in shoes that are too small for him    · Wounds have evidence of prior bleeding but are not actively bleeding currently  · No evidence of infection  · Pain is mild  · Apply Neosporin and dressing to the small toes bilaterally  · Continue to monitor for infection or worsening pain    Tick bite  Assessment & Plan  · Tick removed from right forearm overnight, unclear how long it had been there  · Patient also removed a tick from his back recently  · No rashes  · Will give one dose of ppx doxycycline  · Monitor for signs/ symptoms of Lyme disease    Homelessness  Assessment & Plan  · Patient reports that after prior inpatient rehabilitation stay at [facility] in November of 2021 he went directly to a shelter and was there until 10 days ago when he started drinking again  · Patient requests inpatient treatment at a rehabilitation center after discharge  · Inpatient consult to case management    COPD (chronic obstructive pulmonary disease) (HCC)  Assessment & Plan  · No current exacerbation  · Albuterol inhaler PRN  · Outpatient follow-up    Nicotine dependence  Assessment & Plan  · Patient reports smoking 1 PPD or more of cigarettes  · NRT offered  · Encouraged cessation        VTE Pharmacologic Prophylaxis:   Pharmacologic: Enoxaparin (Lovenox)  Mechanical VTE Prophylaxis in Place: yes    Code Status: Level 1 - Full Code    Patient Centered Rounds: I have performed bedside rounds with nursing staff today  Discussions with Specialists or Other Care Team Provider: Case management     Education and Discussions with Family / Patient: Discussed with patient    Time Spent for Care: 40 minutes  More than 50% of total time spent on counseling and coordination of care as described above  Minutes of critical care time: 31 minutes  -Critical care time was exclusive of separately billable procedures and teaching time    -Critical care was necessary to treat or prevent imminent or life-threatening deterioration of the following condition: CNS failure/compromise, toxidrome (alcohol withdrawal)  -Critical care time was spent personally by me on the following activities as well as the above as per the course and rest of chart: obtaining history from patient/surrogate, review of old charts, development of a treatment plan, discussions with referring provider(s) and/or consultants, examination of the patient, performing treatments and interventions, evaluation of patient's response to the treatment, re-evaluation of the patient's condition, ordering/interpreting laboratory studies, ordering/interpreting of radiographic studies        Current Length of Stay: 1 day(s)    Current Patient Status: Inpatient     Certification Statement: The patient will continue to require additional inpatient hospital stay due to Alcohol withdrawal with complication Discharge Plan: Inpatient rehab when medically stable              Subjective:   Patient states that his withdrawal is overall improving but still has symptoms including anxiety, tremor, nausea  Has been able to eat some of his breakfast this morning  Tick removed from right forearm overnight and patient states he also removed a tick from his back recently  Patient denies any fever, rashes, body aches, headache, neck pain or stiffness  Objective:     Clinical Opiate Withdrawal Scale  Pulse: 79    SEWS Total Score: 0 (2022  9:45 AM)        Last 24 Hours Medication List:   Current Facility-Administered Medications   Medication Dose Route Frequency Provider Last Rate    acetaminophen  650 mg Oral Q6H PRN Leida Defrancisco, PA-C      albuterol  2 puff Inhalation Q4H PRN Leida Defrancisco, PA-C      enoxaparin  40 mg Subcutaneous Daily Leida Defrancisco, PA-C      folic acid  1 mg Oral Daily Leida Defrancisco, PA-C      multivitamin-minerals  1 tablet Oral Daily Leida Defrancisco, PA-C      neomycin-bacitracin-polymyxin b  2 small application Topical BID Saqib Mode, DO      nicotine  1 patch Transdermal Daily Leida Defrancisco, PA-C      PHENobarbital  130 mg Intravenous Once Lulu Kent MD      PHENobarbital  130 mg Intravenous Once Lulu Kent MD      PHENobarbital  130 mg Intravenous Once Lulu Kent MD      sodium chloride  125 mL/hr Intravenous Continuous Leida Defrancisco, PA-C 125 mL/hr (22 0355)    thiamine  100 mg Oral Daily Leida Defrancisco, PA-C           Vitals:   Temp (24hrs), Av 2 °F (37 3 °C), Min:98 4 °F (36 9 °C), Max:99 8 °F (37 7 °C)    Temp:  [98 4 °F (36 9 °C)-99 8 °F (37 7 °C)] 98 9 °F (37 2 °C)  HR:  [] 79  Resp:  [17-18] 17  BP: (136-147)/() 147/82  SpO2:  [88 %-99 %] 92 %  Body mass index is 20 87 kg/m²  Input and Output Summary (last 24 hours):     Intake/Output Summary (Last 24 hours) at 2022 1008  Last data filed at 5/18/2022 1801  Gross per 24 hour   Intake 720 ml   Output --   Net 720 ml       Physical Exam:   Physical Exam  Vitals reviewed  Constitutional:       Comments: Chronically ill appearing   HENT:      Head: Normocephalic and atraumatic  Mouth/Throat:      Mouth: Mucous membranes are moist       Pharynx: Oropharynx is clear  Eyes:      Conjunctiva/sclera: Conjunctivae normal       Pupils: Pupils are equal, round, and reactive to light  Cardiovascular:      Rate and Rhythm: Normal rate and regular rhythm  Heart sounds: No murmur heard  No friction rub  No gallop  Pulmonary:      Effort: Pulmonary effort is normal  No respiratory distress  Breath sounds: Normal breath sounds  Abdominal:      General: There is no distension  Palpations: Abdomen is soft  Tenderness: There is no abdominal tenderness  Musculoskeletal:         General: No swelling  Cervical back: Neck supple  Right lower leg: No edema  Left lower leg: No edema  Comments: Chronic foot wounds with no erythema, discharge, other signs of infection   Skin:     General: Skin is warm and dry  Comments: No rashes noted   Neurological:      General: No focal deficit present  Mental Status: He is alert and oriented to person, place, and time        Comments: Tremulous   Psychiatric:      Comments: Mildly anxious         Additional Data:     Labs:   Results from last 7 days   Lab Units 05/19/22  0435 05/18/22  0615   WBC Thousand/uL 6 91 11 29*   HEMOGLOBIN g/dL 14 0 16 7   HEMATOCRIT % 41 8 48 4   PLATELETS Thousands/uL 202 260   NEUTROS PCT %  --  72   LYMPHS PCT %  --  19   MONOS PCT %  --  8   EOS PCT %  --  0      Results from last 7 days   Lab Units 05/19/22  0435   SODIUM mmol/L 136*   POTASSIUM mmol/L 3 8   CHLORIDE mmol/L 105   CO2 mmol/L 27   BUN mg/dL 12   CREATININE mg/dL 0 66*   ANION GAP mmol/L 4*   CALCIUM mg/dL 8 0*   ALBUMIN g/dL 3 1   TOTAL BILIRUBIN mg/dL 1 27   ALK PHOS U/L 93 ALT U/L 69*   AST U/L 70*   GLUCOSE RANDOM mg/dL 111*                          * I Have Reviewed All Lab Data Listed Above  * Additional Pertinent Lab Tests Reviewed: Coleen 66 Admission Reviewed      Imaging Studies: I have personally reviewed pertinent reports  Recent Cultures (last 7 days): Today, Patient Was Seen By: Janett Yan MD    ** Please Note: Dictation voice to text software may have been used in the creation of this document   **

## 2022-05-19 NOTE — UTILIZATION REVIEW
Inpatient Admission Authorization Request   NOTIFICATION OF INPATIENT ADMISSION/INPATIENT AUTHORIZATION REQUEST   SERVICING FACILITY:   [address] [place] [address]  [address] , [facility], [address] [place] [address]  Tax ID: [id number]  NPI: [id number]  Place of Service: Inpatient [address]  Place of Service Code: 24     ATTENDING PROVIDER:  Attending Name and NPI#: Claus Peterson, Kobi Eduardo Childers [[id number]]  Address: [address]  , [facility], [address] [place] [address]  Phone: [phone]     UTILIZATION REVIEW CONTACT:  Nataliia Howe Utilization   Network Utilization Review Department  Phone: [phone]  Fax [phone]  Email: [email or web address]     PHYSICIAN ADVISORY SERVICES:  FOR [id number]-[id number] REVIEW - MEDICAL NECESSITY DENIAL  Phone: [phone]  Fax: [phone]  Email: [email or web address]     TYPE OF REQUEST:  Inpatient Status     ADMISSION INFORMATION:  ADMISSION DATE/TIME: 5/18/22  9:25 AM  PATIENT DIAGNOSIS CODE/DESCRIPTION:  Alcohol withdrawal ([facility] ) [F10 239]  Alcohol abuse [F10 10]  Encounter for assessment of alcohol and drug use [Z76 89]  DISCHARGE DATE/TIME: No discharge date for patient encounter  IMPORTANT INFORMATION:  Please contact the Nataliia Howe directly with any questions or concerns regarding this request  Department voicemails are confidential     Send requests for admission clinical reviews, concurrent reviews, approvals, and administrative denials due to lack of clinical to fax [phone]

## 2022-05-19 NOTE — PLAN OF CARE
Problem: Nutrition/Hydration-ADULT  Goal: Nutrient/Hydration intake appropriate for improving, restoring or maintaining nutritional needs  Description: Monitor and assess patient's nutrition/hydration status for malnutrition  Collaborate with interdisciplinary team and initiate plan and interventions as ordered  Monitor patient's weight and dietary intake as ordered or per policy  Utilize nutrition screening tool and intervene as necessary  Determine patient's food preferences and provide high-protein, high-caloric foods as appropriate       INTERVENTIONS:  - Monitor oral intake, urinary output, labs, and treatment plans  - Assess nutrition and hydration status and recommend course of action  - Evaluate amount of meals eaten  - Assist patient with eating if necessary   - Allow adequate time for meals  - Recommend/ encourage appropriate diets, oral nutritional supplements, and vitamin/mineral supplements  - Order, calculate, and assess calorie counts as needed  - Recommend, monitor, and adjust tube feedings and TPN/PPN based on assessed needs  - Assess need for intravenous fluids  - Provide specific nutrition/hydration education as appropriate  - Include patient/family/caregiver in decisions related to nutrition  Outcome: Progressing     Problem: SUBSTANCE USE/ABUSE  Goal: By discharge, will develop insight into their chemical dependency and sustain motivation to continue in recovery  Description: INTERVENTIONS:  - Attends all daily group sessions and scheduled AA groups  - Actively practices coping skills through participation in the therapeutic community and adherence to program rules  - Reviews and completes assignments from individual treatment plan  - Assist patient development of understanding of their personal cycle of addiction and relapse triggers  Outcome: Progressing  Goal: By discharge, patient will have ongoing treatment plan addressing chemical dependency  Description: INTERVENTIONS:  - Assist patient with resources and/or appointments for ongoing recovery based living  Outcome: Progressing     Problem: PAIN - ADULT  Goal: Verbalizes/displays adequate comfort level or baseline comfort level  Description: Interventions:  - Encourage patient to monitor pain and request assistance  - Assess pain using appropriate pain scale  - Administer analgesics based on type and severity of pain and evaluate response  - Implement non-pharmacological measures as appropriate and evaluate response  - Consider cultural and social influences on pain and pain management  - Notify physician/advanced practitioner if interventions unsuccessful or patient reports new pain  Outcome: Progressing     Problem: INFECTION - ADULT  Goal: Absence or prevention of progression during hospitalization  Description: INTERVENTIONS:  - Assess and monitor for signs and symptoms of infection  - Monitor lab/diagnostic results  - Monitor all insertion sites, i e  indwelling lines, tubes, and drains  - Monitor endotracheal if appropriate and nasal secretions for changes in amount and color  - Faison appropriate cooling/warming therapies per order  - Administer medications as ordered  - Instruct and encourage patient and family to use good hand hygiene technique  - Identify and instruct in appropriate isolation precautions for identified infection/condition  Outcome: Progressing  Goal: Absence of fever/infection during neutropenic period  Description: INTERVENTIONS:  - Monitor WBC    Outcome: Progressing     Problem: SAFETY ADULT  Goal: Patient will remain free of falls  Description: INTERVENTIONS:  - Educate patient/family on patient safety including physical limitations  - Instruct patient to call for assistance with activity   - Consult OT/PT to assist with strengthening/mobility   - Keep Call bell within reach  - Keep bed low and locked with side rails adjusted as appropriate  - Keep care items and personal belongings within reach  - Initiate and maintain comfort rounds  - Make Fall Risk Sign visible to staff  - Apply yellow socks and bracelet for high fall risk patients  - Consider moving patient to room near nurses station  Outcome: Progressing  Goal: Maintain or return to baseline ADL function  Description: INTERVENTIONS:  -  Assess patient's ability to carry out ADLs; assess patient's baseline for ADL function and identify physical deficits which impact ability to perform ADLs (bathing, care of mouth/teeth, toileting, grooming, dressing, etc )  - Assess/evaluate cause of self-care deficits   - Assess range of motion  - Assess patient's mobility; develop plan if impaired  - Assess patient's need for assistive devices and provide as appropriate  - Encourage maximum independence but intervene and supervise when necessary  - Involve family in performance of ADLs  - Assess for home care needs following discharge   - Consider OT consult to assist with ADL evaluation and planning for discharge  - Provide patient education as appropriate  Outcome: Progressing  Goal: Maintains/Returns to pre admission functional level  Description: INTERVENTIONS:  - Perform BMAT or MOVE assessment daily    - Set and communicate daily mobility goal to care team and patient/family/caregiver     - Collaborate with rehabilitation services on mobility goals if consulted  - Out of bed for toileting  - Record patient progress and toleration of activity level   Outcome: Progressing     Problem: DISCHARGE PLANNING  Goal: Discharge to home or other facility with appropriate resources  Description: INTERVENTIONS:  - Identify barriers to discharge w/patient and caregiver  - Arrange for needed discharge resources and transportation as appropriate  - Identify discharge learning needs (meds, wound care, etc )  - Arrange for interpretive services to assist at discharge as needed  - Refer to Case Management Department for coordinating discharge planning if the patient needs post-hospital services based on physician/advanced practitioner order or complex needs related to functional status, cognitive ability, or social support system  Outcome: Progressing     Problem: Knowledge Deficit  Goal: Patient/family/caregiver demonstrates understanding of disease process, treatment plan, medications, and discharge instructions  Description: Complete learning assessment and assess knowledge base    Interventions:  - Provide teaching at level of understanding  - Provide teaching via preferred learning methods  Outcome: Progressing     Problem: Potential for Falls  Goal: Patient will remain free of falls  Description: INTERVENTIONS:  - Educate patient/family on patient safety including physical limitations  - Instruct patient to call for assistance with activity   - Consult OT/PT to assist with strengthening/mobility   - Keep Call bell within reach  - Keep bed low and locked with side rails adjusted as appropriate  - Keep care items and personal belongings within reach  - Initiate and maintain comfort rounds  - Make Fall Risk Sign visible to staff  - Apply yellow socks and bracelet for high fall risk patients  - Consider moving patient to room near nurses station  Outcome: Progressing

## 2022-05-19 NOTE — PROCEDURES
This provider was informed by Primary RN that when he was obtaining lab work he noticed a tick on the patient's right posterior forearm  A  3 mm tick was observed burrowed into the patient's skin  Suture removal kit was obtained  Area of skin was first cleansed with alcohol wipe then tick was successfully removed intact with forceps  Forearm was once again cleaned with alcohol pad   Provider and Primary RN then preformed another skin exam

## 2022-05-19 NOTE — QUICK NOTE
Notified by nursing of patient report of rash on leg  Of note, patient recently found to have multiple ticks on his person: one tick was removed earlier this morning from right forearm, additional tick was later found crawling on lower extremity  Patient reports burning and itching rash on left thigh, states possibly present for about 10 days  Upon exam, presents with raw irritated erythematous blanchable skin of upper inner left thigh  Appears similar to chafing-like irritation  No irritation/erythema noted of right thigh  No macular/papular rash note, no bulls-eye rash present  No tick noted on LLE       Patient already received 1-time dose prophylactic doxycycline   Will order hydrocortisone ointment PRN   Continue to monitor

## 2022-05-19 NOTE — ASSESSMENT & PLAN NOTE
· Tick removed from right forearm overnight, unclear how long it had been there  · Patient also removed a tick from his back recently  · No rashes  · Will give one dose of ppx doxycycline  · Monitor for signs/ symptoms of Lyme disease

## 2022-05-20 VITALS
BODY MASS INDEX: 20.85 KG/M2 | RESPIRATION RATE: 18 BRPM | SYSTOLIC BLOOD PRESSURE: 137 MMHG | TEMPERATURE: 98.8 F | WEIGHT: 153.9 LBS | HEART RATE: 73 BPM | DIASTOLIC BLOOD PRESSURE: 88 MMHG | HEIGHT: 72 IN | OXYGEN SATURATION: 97 %

## 2022-05-20 LAB
ALBUMIN SERPL BCP-MCNC: 3 G/DL (ref 3–5.2)
ALP SERPL-CCNC: 87 U/L (ref 43–122)
ALT SERPL W P-5'-P-CCNC: 69 U/L
ANION GAP SERPL CALCULATED.3IONS-SCNC: 3 MMOL/L (ref 5–14)
AST SERPL W P-5'-P-CCNC: 72 U/L (ref 17–59)
BILIRUB SERPL-MCNC: 0.76 MG/DL
BUN SERPL-MCNC: 6 MG/DL (ref 5–25)
CALCIUM ALBUM COR SERPL-MCNC: 8.7 MG/DL (ref 8.3–10.1)
CALCIUM SERPL-MCNC: 7.9 MG/DL (ref 8.4–10.2)
CHLORIDE SERPL-SCNC: 106 MMOL/L (ref 97–108)
CO2 SERPL-SCNC: 27 MMOL/L (ref 22–30)
CREAT SERPL-MCNC: 0.56 MG/DL (ref 0.7–1.5)
ERYTHROCYTE [DISTWIDTH] IN BLOOD BY AUTOMATED COUNT: 16 % (ref 11.6–15.1)
GFR SERPL CREATININE-BSD FRML MDRD: 114 ML/MIN/1.73SQ M
GLUCOSE SERPL-MCNC: 94 MG/DL (ref 70–99)
HCT VFR BLD AUTO: 39.8 % (ref 36.5–49.3)
HGB BLD-MCNC: 13.6 G/DL (ref 12–17)
MAGNESIUM SERPL-MCNC: 1.9 MG/DL (ref 1.6–2.3)
MCH RBC QN AUTO: 33.3 PG (ref 26.8–34.3)
MCHC RBC AUTO-ENTMCNC: 34.2 G/DL (ref 31.4–37.4)
MCV RBC AUTO: 97 FL (ref 82–98)
PLATELET # BLD AUTO: 163 THOUSANDS/UL (ref 149–390)
PMV BLD AUTO: 9.2 FL (ref 8.9–12.7)
POTASSIUM SERPL-SCNC: 3.6 MMOL/L (ref 3.6–5)
PROT SERPL-MCNC: 5.6 G/DL (ref 5.9–8.4)
RBC # BLD AUTO: 4.09 MILLION/UL (ref 3.88–5.62)
SODIUM SERPL-SCNC: 136 MMOL/L (ref 137–147)
WBC # BLD AUTO: 6.46 THOUSAND/UL (ref 4.31–10.16)

## 2022-05-20 PROCEDURE — 99238 HOSP IP/OBS DSCHRG MGMT 30/<: CPT

## 2022-05-20 PROCEDURE — 97162 PT EVAL MOD COMPLEX 30 MIN: CPT

## 2022-05-20 PROCEDURE — 85027 COMPLETE CBC AUTOMATED: CPT | Performed by: EMERGENCY MEDICINE

## 2022-05-20 PROCEDURE — 80053 COMPREHEN METABOLIC PANEL: CPT | Performed by: EMERGENCY MEDICINE

## 2022-05-20 PROCEDURE — 83735 ASSAY OF MAGNESIUM: CPT | Performed by: EMERGENCY MEDICINE

## 2022-05-20 RX ORDER — HYDROXYZINE HYDROCHLORIDE 25 MG/1
25 TABLET, FILM COATED ORAL ONCE
Status: COMPLETED | OUTPATIENT
Start: 2022-05-20 | End: 2022-05-20

## 2022-05-20 RX ORDER — HYDROXYZINE HYDROCHLORIDE 25 MG/1
25 TABLET, FILM COATED ORAL EVERY 6 HOURS PRN
Qty: 56 TABLET | Refills: 0 | Status: SHIPPED | OUTPATIENT
Start: 2022-05-20 | End: 2022-06-03

## 2022-05-20 RX ORDER — HYDROXYZINE HYDROCHLORIDE 25 MG/1
25 TABLET, FILM COATED ORAL EVERY 6 HOURS PRN
Status: DISCONTINUED | OUTPATIENT
Start: 2022-05-20 | End: 2022-05-20 | Stop reason: HOSPADM

## 2022-05-20 RX ADMIN — THIAMINE HCL TAB 100 MG 100 MG: 100 TAB at 08:47

## 2022-05-20 RX ADMIN — HYDROXYZINE HYDROCHLORIDE 25 MG: 25 TABLET, FILM COATED ORAL at 08:47

## 2022-05-20 RX ADMIN — ENOXAPARIN SODIUM 40 MG: 100 INJECTION SUBCUTANEOUS at 08:47

## 2022-05-20 RX ADMIN — FOLIC ACID 1 MG: 1 TABLET ORAL at 08:47

## 2022-05-20 RX ADMIN — ACETAMINOPHEN 650 MG: 325 TABLET ORAL at 09:45

## 2022-05-20 RX ADMIN — HYDROXYZINE HYDROCHLORIDE 25 MG: 25 TABLET, FILM COATED ORAL at 10:26

## 2022-05-20 RX ADMIN — MULTIPLE VITAMINS W/ MINERALS TAB 1 TABLET: TAB ORAL at 08:47

## 2022-05-20 NOTE — CASE MANAGEMENT
Case Management Discharge Planning Note    Patient name Christin Pierson  Location 5T DETOX 503/5T DETOX 50* MRN [id number]  : 1965 Date 2022       Current Admission Date: 2022  Current Admission Diagnosis:Alcohol withdrawal syndrome with complication [facility])   Patient Active Problem List    Diagnosis Date Noted    Tick bite 2022    Open wound of foot, initial encounter 2022    Homelessness 2022    Transaminitis 2022    COVID-19 2022    Hyponatremia 2022    Hypertension 2019    Alcohol withdrawal syndrome with complication ([facility] )     Nicotine dependence 2019    COPD (chronic obstructive pulmonary disease) ([facility] ) 2019    Acute respiratory failure with hypoxia ([facility] ) 2019    Alcohol use disorder, severe, dependence ([facility] ) 2019      LOS (days): 2  Geometric Mean LOS (GMLOS) (days):   Days to GMLOS:     OBJECTIVE:  Risk of Unplanned Readmission Score: 12 46         Current admission status: Inpatient   Preferred Pharmacy:   [facility], [address]  [address] [address]  Phone: [phone] Fax: [phone] #[phone]  [address] [place] [address]  Phone: [phone] Fax: [phone] [facility],[address], [address] [facility] [address] [place] [address]  Phone: [phone] Fax: [phone]    Primary Care Provider: Jeri Chen MD    Primary Insurance: [employer]  Secondary Insurance:     DISCHARGE DETAILS: Cm confirmed pt's admission to [place] [address] for today and coordinated with [address] pharmacy to acquire pt medications  Cm met with pt and pt continues to be agreeable to enter [address] indicated transportation for pt at 1300hr  Cm contacted [facility] to complete 3 5 prior auth    Cm completed demographics with Claudio Goins and then was transferred to [place] care manager, to complete clinical review  Pt was approved for 16 days 3 5 inpatient GORAN tx at [facility]  Cm attempted to contact pt sister, Lisbeth Carr, to inform of transfer  Cm left a VM indicating pt was entering inpatient GORAN tx  Pt will be transferred to [facility] today for inpatient GORAN tx  Discharge planning discussed with[de-identified] patient  Freedom of Choice: Yes                   Contacts  Patient Contacts: [employer] [facility]  Relationship to Patient[de-identified] Treatment Provider  Contact Method: Phone  Phone Number: [phone]  Reason/Outcome: Continuity of Care, Discharge Planning              Other Referral/Resources/Interventions Provided:  Referrals Provided[de-identified] Support Group, Therapist, IOP, Lodging  Post Acute Placement to[de-identified] Substance Abuse Treatment    Would you like to participate in our [address] service program?  : Yes          Transport at Discharge : Auto with designated         [facility]           Transfer Mode: Self  Accompanied by: Alone

## 2022-05-20 NOTE — DISCHARGE SUMMARY
MEDICAL DETOX UNIT, LEVEL 4  Department of Medical Toxicology  Reason for Admission/Principal Problem:  ETOH withdrawal, EtOH use disorder  Admitting provider: ANGELITO Loredo MD   5/18/2022  5:58 AM       Discharging Physician / Practitioner: Mariama Berkowitz PA-C  PCP: Pepito Lee MD  Admission Date:   Admission Orders (From admission, onward)     Ordered        05/18/22 0925  INPATIENT ADMISSION  Once                      Discharge Date: 05/20/22    Medical Problems             Resolved Problems  Date Reviewed: 5/7/2022   None                 * Alcohol withdrawal syndrome with complication [facility])  Assessment & Plan  · Discontinue SEWS protocol as patient has received four consecutive 0's  · Denies any current withdrawal symptoms besides continued anxiety, will treat symptomatically  · VS stable HR 73, BP: 133/ 88  · Patient received a total of 1560 mg of phenobarbital without complication         Alcohol use disorder, severe, dependence (HCC)  Assessment & Plan  · Treatment for withdrawal as above  · Daily thiamine, folate, MVI  · Encourage cessation  · Case management consult- patient is interested in inpatient drug and alcohol rehab upon discharge   Placement found for patient at [facility]        Tick bite  [address] [place] [address]  · Tick removed from right forearm overnight, unclear how long it had been there  · Patient also removed a tick from his back recently  · No rashes  · Will give one dose of ppx doxycycline  · Monitor for signs/ symptoms of Lyme disease    Transaminitis  Assessment & Plan  · Likely due to chronic alcohol use  · Stable, continue to monitor    Homelessness  Assessment & Plan  · Patient reports that after prior inpatient rehabilitation stay at [facility] in November of 2021 he went directly to a shelter and was there until 10 days ago when he started drinking again  · Patient requests inpatient treatment at a rehabilitation center after discharge  · Inpatient consult to case management    Open wound of foot, initial encounter  Assessment & Plan  · Patient has superficial wounds of the little toes bilaterally due to walking long distances without socks in shoes that are too small for him  · Wounds have evidence of prior bleeding but are not actively bleeding currently  · No evidence of infection  · Pain is mild  · Patient was monitored for signs of infection  · Continue Neosporin and dressing to the small toes bilaterally      COPD (chronic obstructive pulmonary disease) ([facility])  Assessment & Plan  · No current exacerbation  · Albuterol inhaler PRN  · Outpatient follow-up with PCP     Nicotine dependence  Assessment & Plan  · Patient reports smoking 1 PPD or more of cigarettes  · NRT offered  · Encouraged cessation      Consultations During Hospital Stay:  · Case management    Procedures Performed:   · Tick removal    Significant Findings / Test Results:   · Transaminitis - improving    Incidental Findings:   · None     Test Results Pending at Discharge (will require follow up): · None     Outpatient Tests Requested:  · None    Complications:  None    Reason for Admission:  ETOH withdrawal, EtOH use disorder    Hospital Course:     Khai Tariq is a 61 y o  male patient with history of alcohol use disorder, COPD, and tobacco dependence who originally presented to the [address] [place] [address] Emergency Department on 5/18/2022 requesting assistance with alcohol detoxification  After evaluation and exam in the emergency department he was transferred to medical detox unit for medical management of alcohol withdrawal   Upon arrival to the unit sews protocol was initiated and he received initial dose of 260 mg phenobarbital   IV fluids were initiated along with thiamine, folic acid, and multivitamin supplementation    Patient had superficial wounds the lateral toes bilateral  due to walking extended distances, evidence of prior bleeding had been noted but no evidence of infection were appreciated  Neosporin and dressing was applied with continued monitoring for infection ans/or worsening pain for which patient did not require any further interventions  Multiple ticks had been removed from patient, one tick was removed from right forearm, with additional tick removed from lower extremity  Pt tolerated one time dose prophylactic doxycycline administered  On 05/19/2022 patient had received 1560mg of phenobarbital and had tolerated SEWS protocol  Patient was evaluated by physical therapy during hospitalization, PT recommended no further outpatient recommendations as patient is able to ambulate without any difficulty  No further symptoms of alcohol withdrawal or appreciated  On 05/20/2022 with assistance and coordination by case management patient was discharged to [facility] inpatient rehabilitation  Please see above list of diagnoses and related plan for additional information  Condition at Discharge: good     Discharge Day Visit / Exam:     Subjective:  Patient reports to continued anxiety  Patient reports that he has chronic underlining anxiety disorder  Spoke to patient about initiation of a low dose SSRI and possible consult to psychiatry for further management  Patient declined  States " Can I continue with phenobarbital as outpatient because that "knocks me out"  Patient was educated that phenobarbital was only given to patient when he was actively withdrawing  Patient has not scored on protocol, therefore phenobarbital was discontinued  He denies any acute episodes of nausea, vomiting, diarrhea, or tremors        Vitals: Blood Pressure: 137/88 (05/20/22 0700)  Pulse: 73 (05/20/22 0700)  Temperature: 98 8 °F (37 1 °C) (05/20/22 0700)  Temp Source: Temporal (05/20/22 0700)  Respirations: 18 (05/20/22 0700)  Height: 6' (182 9 cm) (05/18/22 1032)  Weight - Scale: 69 8 kg (153 lb 14 4 oz) (05/18/22 1032)  SpO2: 97 % (05/20/22 0700)  Exam:   Physical Exam  HENT:      Mouth/Throat:      Pharynx: No posterior oropharyngeal erythema  Cardiovascular:      Rate and Rhythm: Normal rate and regular rhythm  Heart sounds: No murmur heard  Pulmonary:      Effort: No respiratory distress  Breath sounds: Normal breath sounds  No wheezing  Chest:   Breasts:      Right: Normal       Left: Normal        Abdominal:      General: Bowel sounds are normal  There is no distension  Palpations: Abdomen is soft  Tenderness: There is no abdominal tenderness  Skin:     General: Skin is warm and dry  Neurological:      Mental Status: He is alert and oriented to person, place, and time  Motor: No tremor  Psychiatric:         Mood and Affect: Mood is anxious  Behavior: Behavior is cooperative  Discussion with Family:  Discussed with patient    Discharge instructions/Information to patient and family:   See after visit summary for information provided to patient and family  Provisions for Follow-Up Care:  See after visit summary for information related to follow-up care and any pertinent home health orders  Disposition:     Home    For Discharges to [facility] SNF:   · Not Applicable to this Patient - Not Applicable to this Patient    Planned Readmission: NA     Discharge Statement:  I spent 30 minutes discharging the patient  This time was spent on the day of discharge  I had direct contact with the patient on the day of discharge  Greater than 50% of the total time was spent examining patient, answering all patient questions, arranging and discussing plan of care with patient as well as directly providing post-discharge instructions  Additional time then spent on discharge activities  Discharge Medications:  See after visit summary for reconciled discharge medications provided to patient and family        ** Please Note: This note has been constructed using a voice recognition system **

## 2022-05-20 NOTE — PHYSICAL THERAPY NOTE
Physical Therapy Evaluation    Patient's Name: Nubia Campos    Admitting Diagnosis  Alcohol withdrawal ([place] [address] ) [F10 239]  Alcohol abuse [F10 10]  Encounter for assessment of alcohol and drug use [Z76 89]    Problem List  Patient Active Problem List   Diagnosis    Nicotine dependence    COPD (chronic obstructive pulmonary disease) ([facility] )    Acute respiratory failure with hypoxia (HCC)    Alcohol use disorder, severe, dependence ([place] [address] )    Alcohol withdrawal syndrome with complication ([place] [address] )    Hypertension    COVID-19    Hyponatremia    Open wound of foot, initial encounter    Homelessness    Transaminitis    Tick bite       Past Medical History  Past Medical History:   Diagnosis Date    COPD (chronic obstructive pulmonary disease) ([place] [address] )        Past Surgical History  History reviewed  No pertinent surgical history      Recent Imaging  No orders to display       Recent Vital Signs  Vitals:    05/19/22 1741 05/19/22 1908 05/19/22 1924 05/20/22 0700   BP: 128/86 139/94 142/92 137/88   BP Location: Left arm Left arm Left arm Left arm   Pulse: 91 98 99 73   Resp: 18 18 18 18   Temp: 98 8 °F (37 1 °C) 98 8 °F (37 1 °C) 98 5 °F (36 9 °C) 98 8 °F (37 1 °C)   TempSrc: Temporal Temporal Temporal Temporal   SpO2: 94% 96% 97% 97%   Weight:       Height:              05/20/22 0937   PT Last Visit   PT Visit Date 05/20/22   Note Type   Note type Evaluation   Pain Assessment   Pain Assessment Tool 0-10   Pain Score No Pain   Restrictions/Precautions   Weight Bearing Precautions Per Order No   Other Precautions Agitated   Home Living   Type of Home Homeless   Additional Comments D/C to [facility]   Prior Function   Level of [place] Independent with ADLs and functional mobility   Falls in the last 6 months 0   General   Family/Caregiver Present No   Cognition   Arousal/Participation Cooperative   Orientation Level Oriented X4   Following Commands Follows one step commands inconsistently   RLE Assessment   RLE Assessment WFL   LLE Assessment   LLE Assessment WFL   Coordination   Movements are Fluid and Coordinated 0   Coordination and Movement Description first few steps of ambulation is unsteady   Sensation WFL   Light Touch   RLE Light Touch Grossly intact   LLE Light Touch Grossly intact   Bed Mobility   Additional Comments Pt was already seated EOB at beginning of session   Transfers   Sit to Stand 7  Independent   Stand to Sit 7  Independent   Ambulation/Elevation   Gait pattern Decreased foot clearance;Decreased toe off;Decreased heel strike; Inconsistent pam   Gait Assistance 7  Independent   Assistive Device None   Distance 220 ft   Stair Management Assistance 6  Modified independent   Additional items Increased time required   Stair Management Technique One rail L;Reciprocal   Number of Stairs 12   Balance   Static Sitting Good   Dynamic Sitting Good   Static Standing Good   Dynamic Standing Fair +   Ambulatory Fair +   Endurance Deficit   Endurance Deficit Yes   Endurance Deficit Description fatigue and SOB; history of COPD   Activity Tolerance   Activity Tolerance Patient tolerated treatment well   Medical Staff Made Aware Spoke to MD and CM   Nurse Made Aware Spoke to RN   Assessment   Prognosis Good   Problem List Decreased endurance;Decreased mobility; Decreased coordination;Decreased cognition; Impaired judgement;Decreased safety awareness   Assessment see eval note   Barriers to Discharge Inaccessible home environment;Decreased caregiver support   Recommendation   PT Discharge Recommendation No rehabilitation needs   AM-PAC Basic Mobility Inpatient   Turning in Bed Without Bedrails 4   Lying on Back to Sitting on Edge of Flat Bed 4   Moving Bed to Chair 4   Standing Up From Chair 4   Walk in Room 4   Climb 3-5 Stairs 3   Basic Mobility Inpatient Raw Score 23   Basic Mobility Standardized Score 50 88   Highest Level Of Mobility   JH-HLM Goal 7: Walk 25 feet or more   JH-HLM Achieved 7: Walk 25 feet or more   End of Consult   Patient Position at End of Consult Seated edge of bed; All needs within reach       ASSESSMENT                                                                                                                     Khai Cuellar is a 61 y o  male admitted to [facility] on 5/18/2022 for Alcohol withdrawal syndrome with complication ([facility] )  Pt  has a past medical history of COPD (chronic obstructive pulmonary disease) ([facility] )    PT was consulted and pt was seen on 5/20/2022 for mobility assessment and d/c planning  Pt presents seated EOB and agreeable to physical therapy  Pt presents slightly agitated during the duration of our session  Pt strength and ROM WFLs  Pt able to ambulate 220 ft with slight unsteadiness demonstrated in first few steps  No occurrences of LOB  Pt performed one flight of stairs Mod I with increased time required  Pt reports fatigue and being SOB due to COPD  Impairments limiting pt at this time include impaired balance, decreased endurance, decreased coordination, decreased activity tolerance, decreased safety awareness, impaired judgement, and decreased cognition  Pt is currently functioning at a independent level for bed mobility, independent level for transfers, independent level for ambulation with no assistive device, and modified independent assistance for elevations  The patient's AM-PAC Basic Mobility Inpatient Short Form Raw Score is 23  A Raw score of greater than 16 suggests the patient may benefit from discharge to home  Please also refer to the recommendation of the Physical Therapist for safe discharge planning             DME: None    Discharge Disposition:  Home with no needs      Guadalupe Galo, SPT

## 2022-05-21 ENCOUNTER — HOSPITAL ENCOUNTER (EMERGENCY)
Facility: HOSPITAL | Age: 57
Discharge: HOME | End: 2022-05-21
Attending: EMERGENCY MEDICINE | Admitting: EMERGENCY MEDICINE
Payer: COMMERCIAL

## 2022-05-21 VITALS
TEMPERATURE: 98.2 F | BODY MASS INDEX: 20.87 KG/M2 | OXYGEN SATURATION: 99 % | HEART RATE: 74 BPM | RESPIRATION RATE: 18 BRPM | WEIGHT: 153.88 LBS | DIASTOLIC BLOOD PRESSURE: 93 MMHG | SYSTOLIC BLOOD PRESSURE: 143 MMHG

## 2022-05-21 DIAGNOSIS — R30.0 DYSURIA: Primary | ICD-10-CM

## 2022-05-21 DIAGNOSIS — R33.9 INCOMPLETE BLADDER EMPTYING: ICD-10-CM

## 2022-05-21 LAB
ANION GAP SERPL CALCULATED.3IONS-SCNC: 4 MMOL/L (ref 4–13)
BASOPHILS # BLD AUTO: 0.04 THOUSANDS/ΜL (ref 0–0.1)
BASOPHILS NFR BLD AUTO: 1 % (ref 0–1)
BILIRUB UR QL STRIP: NEGATIVE
BUN SERPL-MCNC: 6 MG/DL (ref 5–25)
CALCIUM SERPL-MCNC: 8.6 MG/DL (ref 8.3–10.1)
CHLORIDE SERPL-SCNC: 104 MMOL/L (ref 100–108)
CLARITY UR: CLEAR
CO2 SERPL-SCNC: 30 MMOL/L (ref 21–32)
COLOR UR: YELLOW
CREAT SERPL-MCNC: 0.74 MG/DL (ref 0.6–1.3)
EOSINOPHIL # BLD AUTO: 0.21 THOUSAND/ΜL (ref 0–0.61)
EOSINOPHIL NFR BLD AUTO: 3 % (ref 0–6)
ERYTHROCYTE [DISTWIDTH] IN BLOOD BY AUTOMATED COUNT: 16.5 % (ref 11.6–15.1)
GFR SERPL CREATININE-BSD FRML MDRD: 102 ML/MIN/1.73SQ M
GLUCOSE SERPL-MCNC: 79 MG/DL (ref 65–140)
GLUCOSE UR STRIP-MCNC: NEGATIVE MG/DL
HCT VFR BLD AUTO: 41.4 % (ref 36.5–49.3)
HGB BLD-MCNC: 13.5 G/DL (ref 12–17)
HGB UR QL STRIP.AUTO: NEGATIVE
IMM GRANULOCYTES # BLD AUTO: 0.07 THOUSAND/UL (ref 0–0.2)
IMM GRANULOCYTES NFR BLD AUTO: 1 % (ref 0–2)
KETONES UR STRIP-MCNC: NEGATIVE MG/DL
LEUKOCYTE ESTERASE UR QL STRIP: NEGATIVE
LYMPHOCYTES # BLD AUTO: 1.5 THOUSANDS/ΜL (ref 0.6–4.47)
LYMPHOCYTES NFR BLD AUTO: 20 % (ref 14–44)
MCH RBC QN AUTO: 32.5 PG (ref 26.8–34.3)
MCHC RBC AUTO-ENTMCNC: 32.6 G/DL (ref 31.4–37.4)
MCV RBC AUTO: 100 FL (ref 82–98)
MONOCYTES # BLD AUTO: 0.87 THOUSAND/ΜL (ref 0.17–1.22)
MONOCYTES NFR BLD AUTO: 12 % (ref 4–12)
NEUTROPHILS # BLD AUTO: 4.67 THOUSANDS/ΜL (ref 1.85–7.62)
NEUTS SEG NFR BLD AUTO: 63 % (ref 43–75)
NITRITE UR QL STRIP: NEGATIVE
NRBC BLD AUTO-RTO: 0 /100 WBCS
PH UR STRIP.AUTO: 7.5 [PH]
PLATELET # BLD AUTO: 178 THOUSANDS/UL (ref 149–390)
PMV BLD AUTO: 9.3 FL (ref 8.9–12.7)
POTASSIUM SERPL-SCNC: 4.2 MMOL/L (ref 3.5–5.3)
PROT UR STRIP-MCNC: NEGATIVE MG/DL
RBC # BLD AUTO: 4.16 MILLION/UL (ref 3.88–5.62)
SODIUM SERPL-SCNC: 138 MMOL/L (ref 136–145)
SP GR UR STRIP.AUTO: 1.01 (ref 1–1.03)
UROBILINOGEN UR QL STRIP.AUTO: 0.2 E.U./DL
WBC # BLD AUTO: 7.36 THOUSAND/UL (ref 4.31–10.16)

## 2022-05-21 PROCEDURE — 36415 COLL VENOUS BLD VENIPUNCTURE: CPT | Performed by: EMERGENCY MEDICINE

## 2022-05-21 PROCEDURE — 80048 BASIC METABOLIC PNL TOTAL CA: CPT | Performed by: EMERGENCY MEDICINE

## 2022-05-21 PROCEDURE — 99283 EMERGENCY DEPT VISIT LOW MDM: CPT

## 2022-05-21 PROCEDURE — 85025 COMPLETE CBC W/AUTO DIFF WBC: CPT | Performed by: EMERGENCY MEDICINE

## 2022-05-21 PROCEDURE — 99282 EMERGENCY DEPT VISIT SF MDM: CPT | Performed by: EMERGENCY MEDICINE

## 2022-05-21 PROCEDURE — 81003 URINALYSIS AUTO W/O SCOPE: CPT | Performed by: EMERGENCY MEDICINE

## 2022-05-21 RX ORDER — LANOLIN ALCOHOL/MO/W.PET/CERES
100 CREAM (GRAM) TOPICAL DAILY
COMMUNITY

## 2022-05-21 RX ORDER — OXAZEPAM 30 MG/1
30 CAPSULE, GELATIN COATED ORAL 4 TIMES DAILY
COMMUNITY

## 2022-05-21 NOTE — ED PROVIDER NOTES
History  Chief Complaint   Patient presents with    Possible UTI     Presents to ED from [facility] for c/o urinary buring for several weeks  Patient recently discharged fron  for detox and reports SX at that time also  62-year-old male presents for evaluation of burning with urination that has been ongoing for several weeks  The patient states that he is waking up more frequently in the middle the night having trouble urinating  The patient is also going more often  The patient denies any discharge from his penis  He denies any penile pain or testicular pain  The patient states that he has not been sexually active for 5 years  The patient recently detoxed from alcohol and is currently at [facility]  He denies any history of kidney issues  Prior to Admission Medications   Prescriptions Last Dose Informant Patient Reported? Taking? albuterol (ProAir HFA) 90 mcg/act inhaler   No No   Sig: Inhale 2 puffs every 6 (six) hours as needed for wheezing   Patient not taking: Reported on 5/18/2022   hydrOXYzine HCL (ATARAX) 25 mg tablet   No No   Sig: Take 1 tablet (25 mg total) by mouth every 6 (six) hours as needed for anxiety for up to 14 days   oxazepam (SERAX) 30 MG capsule  Outside Facility (Specify) Yes Yes   Sig: Take 30 mg by mouth in the morning and 30 mg at noon and 30 mg in the evening and 30 mg before bedtime  thiamine 100 MG tablet  Outside Facility (Specify) Yes Yes   Sig: Take 100 mg by mouth in the morning  Facility-Administered Medications: None       Past Medical History:   Diagnosis Date    COPD (chronic obstructive pulmonary disease) ([facility] )        History reviewed  No pertinent surgical history  History reviewed  No pertinent family history  I have reviewed and agree with the history as documented      E-Cigarette/Vaping    E-Cigarette Use Never User      E-Cigarette/Vaping Substances    Nicotine No     THC No     CBD No     Flavoring No     Other No     Unknown No      Social History     Tobacco Use    Smoking status: Current Every Day Smoker     Packs/day: 1 00     Years: 40 00     Pack years: 40 00     Types: Cigarettes    Smokeless tobacco: Never Used   Vaping Use    Vaping Use: Never used   Substance Use Topics    Alcohol use: Yes     Alcohol/week: 112 0 standard drinks     Types: 84 Cans of beer, 28 Shots of liquor per week    Drug use: No       Review of Systems   Constitutional: Negative for chills and fever  Genitourinary: Positive for decreased urine volume, dysuria and frequency  Negative for difficulty urinating, penile pain, penile swelling, scrotal swelling and testicular pain  All other systems reviewed and are negative  Physical Exam  Physical Exam  Vitals and nursing note reviewed  Constitutional:       General: He is not in acute distress  Appearance: He is well-developed  HENT:      Head: Normocephalic and atraumatic  Right Ear: External ear normal       Left Ear: External ear normal       Mouth/Throat:      Pharynx: No oropharyngeal exudate  Eyes:      General: No scleral icterus  Pupils: Pupils are equal, round, and reactive to light  Cardiovascular:      Rate and Rhythm: Normal rate and regular rhythm  Heart sounds: Normal heart sounds  Pulmonary:      Effort: Pulmonary effort is normal  No respiratory distress  Breath sounds: Normal breath sounds  Abdominal:      General: Bowel sounds are normal       Palpations: Abdomen is soft  Tenderness: There is no abdominal tenderness  There is no guarding or rebound  Genitourinary:     Penis: Normal  No tenderness, discharge or lesions  Testes: Normal    Musculoskeletal:         General: Normal range of motion  Cervical back: Normal range of motion and neck supple  Skin:     General: Skin is warm and dry  Findings: No rash  Neurological:      Mental Status: He is alert and oriented to person, place, and time           Vital Signs  ED Triage Vitals   Temperature Pulse Respirations Blood Pressure SpO2   05/21/22 1039 05/21/22 1039 05/21/22 1039 05/21/22 1039 05/21/22 1039   98 2 °F (36 8 °C) 74 18 138/79 99 %      Temp Source Heart Rate Source Patient Position - Orthostatic VS BP Location FiO2 (%)   05/21/22 1039 05/21/22 1039 05/21/22 1039 05/21/22 1039 --   Tympanic Monitor Sitting Right arm       Pain Score       05/21/22 1033       No Pain           Vitals:    05/21/22 1039 05/21/22 1145   BP: 138/79 143/93   Pulse: 74    Patient Position - Orthostatic VS: Sitting          Visual Acuity      ED Medications  Medications - No data to display    Diagnostic Studies  Results Reviewed     Procedure Component Value Units Date/Time    Basic metabolic panel [[id number]] Collected: 05/21/22 1134    Lab Status: Final result Specimen: Blood from Arm, Right Updated: 05/21/22 1207     Sodium 138 mmol/L      Potassium 4 2 mmol/L      Chloride 104 mmol/L      CO2 30 mmol/L      ANION GAP 4 mmol/L      BUN 6 mg/dL      Creatinine 0 74 mg/dL      Glucose 79 mg/dL      Calcium 8 6 mg/dL      eGFR 102 ml/min/1 73sq m     Narrative:      [place] guidelines for Chronic Kidney Disease (CKD):     Stage 1 with normal or high GFR (GFR > 90 mL/min/1 73 square meters)    Stage 2 Mild CKD (GFR = 60-89 mL/min/1 73 square meters)    Stage 3A Moderate CKD (GFR = 45-59 mL/min/1 73 square meters)    Stage 3B Moderate CKD (GFR = 30-44 mL/min/1 73 square meters)    Stage 4 Severe CKD (GFR = 15-29 mL/min/1 73 square meters)    Stage 5 End Stage CKD (GFR <15 mL/min/1 73 square meters)  Note: GFR calculation is accurate only with a steady state creatinine    CBC and differential [[id number]]  (Abnormal) Collected: 05/21/22 1134    Lab Status: Final result Specimen: Blood from Arm, Right Updated: 05/21/22 1142     WBC 7 36 Thousand/uL      RBC 4 16 Million/uL      Hemoglobin 13 5 g/dL      Hematocrit 41 4 %       fL      MCH 32 5 pg MCHC 32 6 g/dL      RDW 16 5 %      MPV 9 3 fL      Platelets 309 Thousands/uL      nRBC 0 /100 WBCs      Neutrophils Relative 63 %      Immat GRANS % 1 %      Lymphocytes Relative 20 %      Monocytes Relative 12 %      Eosinophils Relative 3 %      Basophils Relative 1 %      Neutrophils Absolute 4 67 Thousands/µL      Immature Grans Absolute 0 07 Thousand/uL      Lymphocytes Absolute 1 50 Thousands/µL      Monocytes Absolute 0 87 Thousand/µL      Eosinophils Absolute 0 21 Thousand/µL      Basophils Absolute 0 04 Thousands/µL     UA w Reflex to Microscopic w Reflex to Culture [[id number]] Collected: 05/21/22 1044    Lab Status: Final result Specimen: Urine, Clean Catch Updated: 05/21/22 1050     Color, UA Yellow     Clarity, UA Clear     Specific Gravity, UA 1 010     pH, UA 7 5     Leukocytes, UA Negative     Nitrite, UA Negative     Protein, UA Negative mg/dl      Glucose, UA Negative mg/dl      Ketones, UA Negative mg/dl      Urobilinogen, UA 0 2 E U /dl      Bilirubin, UA Negative     Blood, UA Negative                 No orders to display              Procedures  Procedures         ED Course                                             MDM  Number of Diagnoses or Management Options  Dysuria  Incomplete bladder emptying  Diagnosis management comments: Differential diagnosis:  Urinary tract infection, renal insufficiency doubt gonorrhea and chlamydia due to the patient's absence for the past 5 years  , I also consider BPH    Urinalysis and laboratories reviewed  There is no evidence of acute urinary tract infection  There is no evidence of renal insufficiency  I note that the patient has retained urine after bladder scanning and appropriate decrease in the amount urine in his bladder after subsequent urination in the emergency department  The patient is requesting to be discharged back to the [facility]  The plan is for discharge and outpatient follow-up with Urology    Strict return precautions regarding the inability to urinate the development of flank pain, fevers or any other concerning symptoms were discussed with the patient verbalized understanding of the discharge instructions and warnings  All questions were answered prior to discharge  Amount and/or Complexity of Data Reviewed  Clinical lab tests: reviewed  Review and summarize past medical records: yes        Disposition  Final diagnoses:   Dysuria   Incomplete bladder emptying     Time reflects when diagnosis was documented in both MDM as applicable and the Disposition within this note     Time User Action Codes Description Comment    5/21/2022 12:13 PM Henriquez Lente Add [R30 0] Dysuria     5/21/2022 12:13 PM Henriquez Lente Add [R33 9] Incomplete bladder emptying       ED Disposition     ED Disposition   Discharge    Condition   Stable    Date/Time   Sat May 21, 2022 12:13 PM    Comment   Khai Bryant discharge to home/self care                 Follow-up Information     Follow up With Specialties Details Why Contact Info Additional [address] Urology [facility] Urology Schedule an appointment as soon as possible for a visit  For further evaluation of incomplete bladder emptying [address] [address] [facility] [phone]  702  [facility] [address], [facility]          Discharge Medication List as of 5/21/2022 12:15 PM      CONTINUE these medications which have NOT CHANGED    Details   albuterol (ProAir HFA) 90 mcg/act inhaler Inhale 2 puffs every 6 (six) hours as needed for wheezing, Starting Sat 5/7/2022, Print      hydrOXYzine HCL (ATARAX) 25 mg tablet Take 1 tablet (25 mg total) by mouth every 6 (six) hours as needed for anxiety for up to 14 days, Starting Fri 5/20/2022, Until Fri 6/3/2022 at 2359, Normal      oxazepam (SERAX) 30 MG capsule Take 30 mg by mouth in the morning and 30 mg at noon and 30 mg in the evening and 30 mg before bedtime  , Historical Med      thiamine 100 MG tablet Take 100 mg by mouth in the morning , Historical Med             No discharge procedures on file      PDMP Review       Value Time User    PDMP Reviewed  Yes 5/20/2022  9:41 AM Sugey Bar PA-C          ED Provider  Electronically Signed by           Jackson Madden DO  05/21/22 8306

## 2022-05-23 ENCOUNTER — TELEPHONE (OUTPATIENT)
Dept: UROLOGY | Facility: AMBULATORY SURGERY CENTER | Age: 57
End: 2022-05-23

## 2022-05-23 NOTE — TELEPHONE ENCOUNTER
Please Triage  New Patient    What is the reason for the patients appointment? Recent ED visit, Still having trouble emptying bladder    What office location does the patient prefer? [facility]    Imaging/Lab Results: In epic    Do we accept the patient's insurance or is the patient Self-Pay? Yes, [employer]  Insurance Provider:  Plan Type/Number:  Member ID#: Has the patient had any previous Urologist(s)? No    Have patient records been requested? If not are records showing in Epic: In epic    Has the patient had any outside testing done? In [facility]    Does the patient have a personal history of cancer?   No    Kishor from [employer] can be reached at [phone]

## 2022-05-23 NOTE — TELEPHONE ENCOUNTER
Per Ed report Urinalysis and laboratories reviewed  There is no evidence of acute urinary tract infection  There is no evidence of renal insufficiency  I note that the patient has retained urine after bladder scanning and appropriate decrease in the amount urine in his bladder after subsequent urination in the emergency department  Returned call to facility spoke with Leopoldo   Offered an appt with Dr Yasmeen Verma for 6/17/22

## 2022-05-25 NOTE — UTILIZATION REVIEW
Notification of Discharge   This is a Notification of Discharge from our facility [address]  Please be advised that this patient has been discharge from our facility  Below you will find the admission and discharge date and time including the patients disposition  UTILIZATION REVIEW CONTACT:  Burgess Michaela MA  Utilization   Network Utilization Review Department  Phone: [phone] x carefully listen to the prompts  All voicemails are confidential   Email: [email or web address]     PHYSICIAN ADVISORY SERVICES:  FOR XPIE-DY-BHBM REVIEW - MEDICAL NECESSITY DENIAL  Phone: [phone]  Fax: [phone]  Email: [email or web address]     PRESENTATION DATE: 5/18/2022  5:58 AM  OBERVATION ADMISSION DATE:   INPATIENT ADMISSION DATE: 5/18/22  9:25 AM   DISCHARGE DATE: 5/20/2022  1:34 PM  DISPOSITION: Home/Self Care Home/Self Care      IMPORTANT INFORMATION:  Send all requests for admission clinical reviews, approved or denied determinations and any other requests to dedicated fax number below belonging to the campus where the patient is receiving treatment   List of dedicated fax numbers:  [address] DENIALS (Administrative/Medical Necessity) [phone]   [address] (Maternity/NICU/Pediatrics) [phone]   [facility] Needs [phone]   [address] [facility] [phone]   [address] [place] [address] [phone]   [address] [place] [address] [address] [place] [address],[address] [phone]   [facility]  [phone]   [address] [place] [address], [facility]  [address] [facility] [address] [facility] [phone]

## 2022-06-17 ENCOUNTER — OFFICE VISIT (OUTPATIENT)
Dept: UROLOGY | Facility: HOSPITAL | Age: 57
End: 2022-06-17
Payer: COMMERCIAL

## 2022-06-17 VITALS
WEIGHT: 164 LBS | DIASTOLIC BLOOD PRESSURE: 82 MMHG | BODY MASS INDEX: 21.74 KG/M2 | HEIGHT: 73 IN | SYSTOLIC BLOOD PRESSURE: 128 MMHG

## 2022-06-17 DIAGNOSIS — R33.9 URINARY RETENTION WITH INCOMPLETE BLADDER EMPTYING: ICD-10-CM

## 2022-06-17 DIAGNOSIS — N30.00 ACUTE CYSTITIS WITHOUT HEMATURIA: Primary | ICD-10-CM

## 2022-06-17 PROCEDURE — 99204 OFFICE O/P NEW MOD 45 MIN: CPT | Performed by: UROLOGY

## 2022-06-17 NOTE — PROGRESS NOTES
HISTORY:    Evaluation for symptoms consistent with UTI while in the ER four weeks ago  Patient a very poor historian, cannot express his thoughts very well, currently in a rehab facility for alcohol use    He says he had frequent urination and some burning, he got better for a while after being given antibiotics  Review of that ER visit shows urinalysis was done, totally normal, culture was not sent  Patient denies prior urinary tract conditions of any type for any treatment  Today feels he has slow flow, frequent urination  ASSESSMENT / PLAN:    Urinalysis clear today   mL  Needs further evaluation with renal ultrasound and cystoscopy  Patient says he is homeless, the facility is going to help him find a care home house and we will ask them to help make sure the patient gets to these appointments    The following portions of the patient's history were reviewed and updated as appropriate: allergies, current medications, past family history, past medical history, past social history, past surgical history and problem list     Review of Systems   All other systems reviewed and are negative  Objective:     Physical Exam  Constitutional:       General: He is not in acute distress  Appearance: He is well-developed  He is not diaphoretic  HENT:      Head: Normocephalic and atraumatic  Eyes:      General: No scleral icterus  Pulmonary:      Effort: Pulmonary effort is normal    Genitourinary:     Comments: Penis testes normal    Prostate minimally enlarged no nodules  Skin:     Coloration: Skin is not pale  Neurological:      Mental Status: He is alert and oriented to person, place, and time  Psychiatric:         Behavior: Behavior normal          Thought Content:  Thought content normal          Judgment: Judgment normal            No results found for: PSA]  BUN   Date Value Ref Range Status   05/21/2022 6 5 - 25 mg/dL Final     Creatinine   Date Value Ref Range Status 05/21/2022 0 74 0 60 - 1 30 mg/dL Final     Comment:     Standardized to IDMS reference method     No components found for: CBC      Patient Active Problem List   Diagnosis    Nicotine dependence    COPD [facility]chronic obstructive pulmonary disease) ([facility] )    Acute respiratory failure with hypoxia ([facility])    Alcohol use disorder, severe, dependence ([facility] )    Alcohol withdrawal syndrome with complication ([place] [address] )    Hypertension    COVID-19    Hyponatremia    Open wound of foot, initial encounter    Homelessness    Transaminitis    Tick bite        Diagnoses and all orders for this visit:    Acute cystitis without hematuria  -     US kidney and bladder; Future    Urinary retention with incomplete bladder emptying           Patient ID: Crow Jimenez is a 61 y o  male  Current Outpatient Medications:     albuterol (ProAir HFA) 90 mcg/act inhaler, Inhale 2 puffs every 6 (six) hours as needed for wheezing (Patient not taking: No sig reported), Disp: 8 5 g, Rfl: 0    hydrOXYzine HCL (ATARAX) 25 mg tablet, Take 1 tablet (25 mg total) by mouth every 6 (six) hours as needed for anxiety for up to 14 days, Disp: 56 tablet, Rfl: 0    oxazepam (SERAX) 30 MG capsule, Take 30 mg by mouth in the morning and 30 mg at noon and 30 mg in the evening and 30 mg before bedtime  (Patient not taking: Reported on 6/17/2022), Disp: , Rfl:     thiamine 100 MG tablet, Take 100 mg by mouth in the morning  (Patient not taking: Reported on 6/17/2022), Disp: , Rfl:     Past Medical History:   Diagnosis Date    COPD (chronic obstructive pulmonary disease) ([place] [address] )        History reviewed  No pertinent surgical history      Social History

## 2022-06-22 ENCOUNTER — HOSPITAL ENCOUNTER (OUTPATIENT)
Dept: ULTRASOUND IMAGING | Facility: HOSPITAL | Age: 57
Discharge: HOME | End: 2022-06-22
Attending: UROLOGY
Payer: COMMERCIAL

## 2022-06-22 DIAGNOSIS — N30.00 ACUTE CYSTITIS WITHOUT HEMATURIA: ICD-10-CM

## 2022-06-22 PROCEDURE — 76770 US EXAM ABDO BACK WALL COMP: CPT
